# Patient Record
Sex: MALE | Race: WHITE | Employment: OTHER | URBAN - NONMETROPOLITAN AREA
[De-identification: names, ages, dates, MRNs, and addresses within clinical notes are randomized per-mention and may not be internally consistent; named-entity substitution may affect disease eponyms.]

---

## 2017-11-13 ENCOUNTER — COMMUNICATION - GICH (OUTPATIENT)
Dept: INTERNAL MEDICINE | Facility: OTHER | Age: 67
End: 2017-11-13

## 2017-11-13 DIAGNOSIS — F33.2 MAJOR DEPRESSIVE DISORDER, RECURRENT SEVERE WITHOUT PSYCHOTIC FEATURES (H): ICD-10-CM

## 2017-11-13 DIAGNOSIS — F41.9 ANXIETY DISORDER: ICD-10-CM

## 2017-11-22 ENCOUNTER — HISTORY (OUTPATIENT)
Dept: INTERNAL MEDICINE | Facility: OTHER | Age: 67
End: 2017-11-22

## 2017-11-22 ENCOUNTER — OFFICE VISIT - GICH (OUTPATIENT)
Dept: INTERNAL MEDICINE | Facility: OTHER | Age: 67
End: 2017-11-22

## 2017-11-22 DIAGNOSIS — I49.3 VENTRICULAR PREMATURE DEPOLARIZATION: ICD-10-CM

## 2017-11-22 DIAGNOSIS — I25.10 ATHEROSCLEROTIC HEART DISEASE OF NATIVE CORONARY ARTERY WITHOUT ANGINA PECTORIS: ICD-10-CM

## 2017-11-22 DIAGNOSIS — J98.01 ACUTE BRONCHOSPASM: ICD-10-CM

## 2017-11-22 DIAGNOSIS — F41.9 ANXIETY DISORDER: ICD-10-CM

## 2017-11-22 DIAGNOSIS — Z87.891 PERSONAL HISTORY OF NICOTINE DEPENDENCE: ICD-10-CM

## 2017-11-22 DIAGNOSIS — Z87.898 PERSONAL HISTORY OF OTHER SPECIFIED CONDITIONS (CODE): ICD-10-CM

## 2017-11-22 DIAGNOSIS — E78.2 MIXED HYPERLIPIDEMIA: ICD-10-CM

## 2017-11-22 DIAGNOSIS — R35.1 NOCTURIA: ICD-10-CM

## 2017-11-22 DIAGNOSIS — Z86.59 PERSONAL HISTORY OF OTHER MENTAL AND BEHAVIORAL DISORDERS: ICD-10-CM

## 2017-11-22 DIAGNOSIS — Z23 ENCOUNTER FOR IMMUNIZATION: ICD-10-CM

## 2017-11-22 DIAGNOSIS — I50.32 CHRONIC DIASTOLIC HEART FAILURE (H): ICD-10-CM

## 2017-11-22 DIAGNOSIS — I25.2 OLD MYOCARDIAL INFARCTION: ICD-10-CM

## 2017-11-22 DIAGNOSIS — R12 HEARTBURN: ICD-10-CM

## 2017-11-22 DIAGNOSIS — I25.9 CHRONIC ISCHEMIC HEART DISEASE: ICD-10-CM

## 2017-11-22 DIAGNOSIS — I10 ESSENTIAL (PRIMARY) HYPERTENSION: ICD-10-CM

## 2017-11-22 DIAGNOSIS — R73.03 PREDIABETES: ICD-10-CM

## 2017-11-22 DIAGNOSIS — I25.83 CORONARY ATHEROSCLEROSIS DUE TO LIPID RICH PLAQUE (CODE): ICD-10-CM

## 2017-11-22 LAB
A/G RATIO - HISTORICAL: 1.8 (ref 1–2)
ABSOLUTE BASOPHILS - HISTORICAL: 0.1 THOU/CU MM
ABSOLUTE EOSINOPHILS - HISTORICAL: 0.3 THOU/CU MM
ABSOLUTE IMMATURE GRANULOCYTES(METAS,MYELOS,PROS) - HISTORICAL: 0 THOU/CU MM
ABSOLUTE LYMPHOCYTES - HISTORICAL: 1.8 THOU/CU MM (ref 0.9–2.9)
ABSOLUTE MONOCYTES - HISTORICAL: 0.6 THOU/CU MM
ABSOLUTE NEUTROPHILS - HISTORICAL: 4.4 THOU/CU MM (ref 1.7–7)
ALBUMIN SERPL-MCNC: 4.4 G/DL (ref 3.5–5.7)
ALP SERPL-CCNC: 69 IU/L (ref 34–104)
ALT (SGPT) - HISTORICAL: 16 IU/L (ref 7–52)
ANION GAP - HISTORICAL: 8 (ref 5–18)
AST SERPL-CCNC: 19 IU/L (ref 13–39)
BASOPHILS # BLD AUTO: 1.4 %
BILIRUB SERPL-MCNC: 0.6 MG/DL (ref 0.3–1)
BILIRUB UR QL: NEGATIVE
BUN SERPL-MCNC: 13 MG/DL (ref 7–25)
BUN/CREAT RATIO - HISTORICAL: 13
CALCIUM SERPL-MCNC: 9.5 MG/DL (ref 8.6–10.3)
CHLORIDE SERPLBLD-SCNC: 99 MMOL/L (ref 98–107)
CHOL/HDL RATIO - HISTORICAL: 2.68
CHOLESTEROL TOTAL: 158 MG/DL
CLARITY, URINE: CLEAR CLARITY
CO2 SERPL-SCNC: 27 MMOL/L (ref 21–31)
COLOR UR: YELLOW COLOR
CREAT SERPL-MCNC: 0.99 MG/DL (ref 0.7–1.3)
EOSINOPHIL NFR BLD AUTO: 3.9 %
ERYTHROCYTE [DISTWIDTH] IN BLOOD BY AUTOMATED COUNT: 12 % (ref 11.5–15.5)
ESTIMATED AVERAGE GLUCOSE: 114 MG/DL
GFR IF NOT AFRICAN AMERICAN - HISTORICAL: >60 ML/MIN/1.73M2
GLOBULIN - HISTORICAL: 2.5 G/DL (ref 2–3.7)
GLUCOSE SERPL-MCNC: 109 MG/DL (ref 70–105)
GLUCOSE URINE: NEGATIVE MG/DL
HCT VFR BLD AUTO: 48.4 % (ref 37–53)
HDLC SERPL-MCNC: 59 MG/DL (ref 23–92)
HEMOGLOBIN A1C MONITORING (POCT) - HISTORICAL: 5.6 % (ref 4–6.2)
HEMOGLOBIN: 16.8 G/DL (ref 13.5–17.5)
IMMATURE GRANULOCYTES(METAS,MYELOS,PROS) - HISTORICAL: 0.3 %
KETONES UR QL: NEGATIVE MG/DL
LDLC SERPL CALC-MCNC: 76 MG/DL
LEUKOCYTE ESTERASE URINE: NEGATIVE
LYMPHOCYTES NFR BLD AUTO: 24.4 % (ref 20–44)
MCH RBC QN AUTO: 31.8 PG (ref 26–34)
MCHC RBC AUTO-ENTMCNC: 34.7 G/DL (ref 32–36)
MCV RBC AUTO: 92 FL (ref 80–100)
MONOCYTES NFR BLD AUTO: 8.7 %
NEUTROPHILS NFR BLD AUTO: 61.3 % (ref 42–72)
NITRITE UR QL STRIP: NEGATIVE
NON-HDL CHOLESTEROL - HISTORICAL: 99 MG/DL
OCCULT BLOOD,URINE - HISTORICAL: NEGATIVE
PH UR: 7 [PH]
PLATELET # BLD AUTO: 303 THOU/CU MM (ref 140–440)
PMV BLD: 9.2 FL (ref 6.5–11)
POTASSIUM SERPL-SCNC: 4.8 MMOL/L (ref 3.5–5.1)
PROT SERPL-MCNC: 6.9 G/DL (ref 6.4–8.9)
PROTEIN QUALITATIVE,URINE - HISTORICAL: NEGATIVE MG/DL
PROVIDER ORDERDED STATUS - HISTORICAL: NORMAL
PSA TOTAL (DIAGNOSTIC) - HISTORICAL: 1.91 NG/ML
RED BLOOD COUNT - HISTORICAL: 5.29 MIL/CU MM (ref 4.3–5.9)
SODIUM SERPL-SCNC: 134 MMOL/L (ref 133–143)
SP GR UR STRIP: 1.01
TRIGL SERPL-MCNC: 115 MG/DL
UROBILINOGEN,QUALITATIVE - HISTORICAL: NORMAL EU/DL
WHITE BLOOD COUNT - HISTORICAL: 7.2 THOU/CU MM (ref 4.5–11)

## 2017-11-22 ASSESSMENT — ANXIETY QUESTIONNAIRES
2. NOT BEING ABLE TO STOP OR CONTROL WORRYING: NOT AT ALL
GAD7 TOTAL SCORE: 0
3. WORRYING TOO MUCH ABOUT DIFFERENT THINGS: NOT AT ALL
6. BECOMING EASILY ANNOYED OR IRRITABLE: NOT AT ALL
1. FEELING NERVOUS, ANXIOUS, OR ON EDGE: NOT AT ALL
7. FEELING AFRAID AS IF SOMETHING AWFUL MIGHT HAPPEN: NOT AT ALL
5. BEING SO RESTLESS THAT IT IS HARD TO SIT STILL: NOT AT ALL
4. TROUBLE RELAXING: NOT AT ALL

## 2017-11-22 ASSESSMENT — PATIENT HEALTH QUESTIONNAIRE - PHQ9: SUM OF ALL RESPONSES TO PHQ QUESTIONS 1-9: 0

## 2017-12-28 NOTE — TELEPHONE ENCOUNTER
Patient Information     Patient Name MRN Héctor Simon [id number] Male 1950      Telephone Encounter by Nancy Velasquez RN at 11/15/2017 12:33 PM     Author:  Nancy Velasquez RN Service:  (none) Author Type:  NURS- Registered Nurse     Filed:  11/15/2017  2:45 PM Encounter Date:  2017 Status:  Signed     :  Nancy Velasquez RN (NURS- Registered Nurse)            Refill request from [facility] for Sertraline. Per chart, patient is now due for an annual appointment. A Limited supply sent over to pharmacy and reminder letter sent to patient.     Depression-in adults 18 and over  SSRI Sertraline    Office visit in the past 12 months or as indicated in chart.  Should have clinic visit 1-2 months after initial prescription.    Last visit with NITIN TOBIAS was on: 11/10/2016 in [facility]  Next visit with NITIN TOBIAS is on: 2017 in [facility]  Next visit with Internal Medicine is on: 2017 in [facility]    Max refills 12 months from last office visit or per providers notes.  Prescription refilled per RN Medication Refill Policy.................... Nancy Velasquez RN ....................  11/15/2017   12:34 PM

## 2017-12-28 NOTE — PROGRESS NOTES
Patient Information     Patient Name MRN Sex Héctor Madrigal [id number] Male 1950      Progress Notes by Edenilson Adams MD at 2017  8:00 AM     Author:  Edenilson Adams MD Service:  (none) Author Type:  Physician     Filed:  2017  6:57 PM Encounter Date:  2017 Status:  Signed     :  Edenilson Adams MD (Physician)            Nursing Notes:   Gardenia Childs  2017  7:59 AM  Signed  Patient presents for medication management.      Gardenia Childs LPN        2017 7:50 AM    Héctor Thornton presents to clinic today for:   Chief Complaint    Patient presents with      Medication Management     HPI: Mr. Thornton is a 67 y.o. male who presents today for evaluation of above.     (I25.10,  I25.83) Coronary artery disease due to lipid rich plaque  (primary encounter diagnosis)  (E78.2) Mixed hyperlipidemia  (I10) HYPERTENSION  (R73.03) Prediabetes - NEW Dx - 2015 - A1c 6.2%  (I25.2) History of ST elevation myocardial infarction (STEMI) - 2016 - Inferior RCA - s/p aspiration thrombectomy - PCI of dRCA with Drug Eluting Stent - [facility]  (I25.2) Old inferior wall myocardial infarction  (I50.32) Chronic diastolic heart failure - MILD - ECHO 2015 - EF 50-55%  (Z87.891) History of tobacco abuse  (J98.01) Bronchospasm  (F41.9) Anxiety  (I25.9) Chronic ischemic heart disease  (I49.3) PVC's (premature ventricular contractions)  (Z86.59) History of major depression  (R12) Heartburn  (R35.1) Nocturia  (Z23) Need for vaccination with 13-polyvalent pneumococcal conjugate vaccine  (Z87.898) History of prediabetes     Coronary artery disease, currently stable.  Denies exertional chest pain or heaviness.  He would like to see [facility] with Dr. Courtney.  Referral sent.  Needs cardiac medication refills.    Hyperlipidemia, currently on Crestor 40 mg daily.  Seems to be tolerating it well.  Reports he only takes the Crestor because of his doctors, he does not really believe  in statins but he continues to take them regularly.  We discussed eating grapefruit while taking Crestor, he will plan to cut down on his Crestor dosing during grapefruit season.  Para hypertension, currently well controlled.  Tolerating medications.  Check labs, medication refills today.  Last Lipids:  Chol: 2017 158   115  HDL: 2017 59   LDL: 2017 76    Prediabetes, currently managing with diet.  Recheck labs.  HEMOGLOBIN A1C MONITORING (POCT) (%)    Date Value   2017 5.6     Chronic diastolic heart failure - appears stable.  Denies orthopnea or PND.  Has chronic mild edema.      History of intermittent wheezing/bronchospasm, history of tobacco use, still intermittently using some Mucinex and his albuterol inhaler.  Reports that helps clear up any kind of bronchitis like symptoms he gets.  He would like refills today.    Chronic anxiety, reports much improved with Zoloft.  He would like to continue.  Still intermittently using alprazolam but reports very rarely.    PVCs, much improved with sotalol.  Needs refills today.      heartburn, ongoing.  Still using Prilosec.    Nocturia, continues, reports moire frequent urinating recently.  Check labs, urinalysis, PSA.    Pneumococcal vaccination due, orders placed.    Mr. Hernandezs Body mass index is 38.31 kg/(m^2). This is out of the normal range for a 67 y.o. Normal range for ages 18+ is between 18.5 and 24.9. To lose weight we reviewed risks and benefits of appropriate options such as diet, exercise, and medications. Patient's strategy will be  self-directed nutrition plan and self-directed exercise program   BP Readings from Last 1 Encounters:17 : 128/74  Mr. Hernandezs blood pressure is out of the normal range for adults. Per JNC-8 guidelines normal adult blood pressure is < 120/80, pre-hypertensive is between 120/80 and 139/89, and hypertension is 140/90 or greater. Risks of hypertension were discussed. Patient's  strategy will be weight loss, increased activity and reduced salt intake    Functional Capacity: > 4 METS.   Reports that he can climb a flight of stairs without any chest pain/heaviness or shortness of breath.   Patient reports no current symptoms of fevers, chills, nausea/vomiting.   No cough. No shortness of breath.   No change in bowel/bladder habits. No melena, hematochezia. No Hematuria.   No rashes. Rare palpitations.  No orthopnea/paroxysmal nocturnal dyspnea   No vision or hearing issues.   No significant mood issues   No bruising.     RYANNE:  RYANNE-7 ANXIETY SCREENING 12/9/2016 11/22/2017   RYANNE date (doc flow) 12/9/2016 11/22/2017   Nervous, anxious 2 0   Cannot stop worrying 1 0   Worry about different things 1 0   Cannot relax 0 0   Feeling restless 0 0   Easily annoyed/irritated 1 0   Afraid of awful event 1 0   Score 6 0   Severity mild anxiety none   Some recent data might be hidden         PHQ9:  PHQ Depression Screening 12/9/2016 11/22/2017   Date of PHQ exam (doc flow) 12/9/2016 11/22/2017   1. Lack of interest/pleasure 2 - More than half the days 0 - Not at all   2. Feeling down/depressed 1 - Several days 0 - Not at all   PHQ-2 TOTAL SCORE 3 0   3. Trouble sleeping 3 - Nearly every day 0 - Not at all   4. Decreased energy 3 - Nearly every day 0 - Not at all   5. Appetite change 1 - Several days 0 - Not at all   6. Feelings of failure 1 - Several days 0 - Not at all   7. Trouble concentrating 1 - Several days 0 - Not at all   8. Activity level 0 - Not at all 0 - Not at all   9. Hurting yourself 0 - Not at all 0 - Not at all   PHQ-9 TOTAL SCORE 12 0   PHQ-9 Severity Level moderate none   Functional Impairment very difficult not difficult at all   Some recent data might be hidden          I have personally reviewed the past medical history, past surgical history, medications, allergies, family and social history as listed below, on 11/22/2017.    Patient Active Problem List       Diagnosis  Date Noted      Nocturia  11/22/2017     History of prediabetes  11/22/2017     History of ST elevation myocardial infarction (STEMI) - 8/11/2016 - Inferior RCA - s/p aspiration thrombectomy - PCI of dRCA with Drug Eluting Stent - [facility]  08/23/2016     History of tobacco abuse  08/23/2016     Olecranon bursitis of left elbow  02/03/2016     Chronic diastolic heart failure - MILD - ECHO 9/2015 - EF 50-55%  02/03/2016     Seasonal allergies  09/14/2015     PVC's (premature ventricular contractions)  08/13/2015     Bilateral leg edema  08/12/2014     Old inferior wall myocardial infarction  08/12/2014     Hx of right coronary artery stent placement - 2008 and 2009 08/12/2014     ED (erectile dysfunction)  07/29/2013     FATIGUE  09/21/2011     CONSTIPATION  09/21/2011     Coronary artery disease due to lipid rich plaque  04/22/2011     Inferior Infarct June 2008 BMS placed to distal RCA  Non transmural MI march 2010.late stenosis to RCA OTTO placed at [facility]        Anxiety  04/22/2011     GERD (gastroesophageal reflux disease)  04/22/2011     HYPERTENSION  02/17/2011     History of major depression  02/03/2011     With somatization          PALPITATIONS  02/03/2011     ISCHEMIC HEART DISEASE, CHRONIC  12/13/2010     Mixed hyperlipidemia  12/13/2010     Past Medical History:     Diagnosis  Date     Abrasion      Anxiety      ANXIETY      CAD (coronary artery disease)     Inferior Infarct June 2008 BMS placed to distal RCA Non transmural MI march 2010.late stenosis to RCA OTTO placed at [facility]       CHEST PAIN      CONSTIPATION      Depression     RX with Paxil Spring 2011       DEPRESSION, CHRONIC, SEVERE     With somatization       ED (erectile dysfunction)      EPIGASTRIC PAIN      FATIGUE      GERD (gastroesophageal reflux disease)      Hx of elevated lipids     high cholestserol      HYPERLIPIDEMIA      HYPERTENSION      ISCHEMIC HEART DISEASE, CHRONIC      Palpitations      Tobacco user     Current  user . 2 and 1/2 packs of Camels for 40-45 years. Currently smokes a pipe.       Past Surgical History:      Procedure  Laterality Date     BACK SURGERY  age 19    fractured coccyx       COLONOSCOPY SCREENING  2011    Diverticulosis. No polyps.  Next due 2021.       CORONARY STENT PLACEMENT  6/2008,   3/2009    right coronary stents       ESOPHAGOGASTRODUODENOSCOPY  1/2/11    no HH, no GERD (one interpretation of this might be that the PPI has been successful in preventing injury.)         HAND FINGER SURGERY      right hand repair       KNEE ARTHROSCOPY      lateral release, left knee       TONSILLECTOMY  age 7     Current Outpatient Prescriptions       Medication  Sig Dispense Refill     albuterol HFA (PRO-AIR,VENTOLIN,PROVENTIL) 90 mcg/actuation inhaler Inhale 2 Puffs by mouth 4 times daily if needed (for cough and SOB). 1 Inhaler 3     ALPRAZolam (XANAX) 0.25 mg tablet Take 0.5 tablets by mouth once daily. 1/2 tab AS NEEDED for Anxiety 15 tablet 5     aspirin enteric coated 81 mg tablet Take 1 tablet by mouth once daily with a meal. 30 tablet 12     clopidogrel (PLAVIX) 75 mg tablet Take 1 tablet by mouth once daily. 90 tablet 3     FOLIC ACID/MULTIVIT-MIN/LUTEIN (CENTRUM SILVER ORAL) Take  by mouth.       furosemide (LASIX) 20 mg tablet TAKE 1/2 TO 1 TABLET BY MOUTH EVERY MORNING AS NEEDED 90 tablet 3     losartan (COZAAR) 50 mg tablet Take 1 tablet by mouth once daily. 90 tablet 3     nitroglycerin (NITROSTAT) 0.4 mg sublingual tablet Place 1 tablet under the tongue every 5 minutes if needed. 25 tablet 6     omega-3 fatty acids-vitamin E (FISH OIL) 1,000 mg cap Take 500 mg by mouth.       omeprazole (PRILOSEC) 20 mg Delayed-Release capsule Take 1 capsule by mouth once daily if needed for GI Upset. 60 capsule 11     rosuvastatin (CRESTOR) 40 mg tablet Take 1 tablet by mouth at bedtime. 90 tablet 3     SACCHAROMYCES BOULARDII (PROBIOTIC, S.BOULARDII, ORAL) Take  by mouth once daily.       sertraline (ZOLOFT) 50  mg tablet TAKE 1 AND 1/2 TO 2 TABLETS BY MOUTH EVERY  tablet 3     sildenafil citrate (VIAGRA) 100 mg tablet Take 1 tablet by mouth once daily if needed for Erectile Dysfunction. Take 30min to 4 hours before sexual activity. Max 100mg/24hr. 6 tablet 6     sotalol (BETAPACE) 80 mg tablet Take 1 tablet by mouth 2 times daily before meals. 180 tablet 3     sucralfate (CARAFATE) 1 gram tablet Take 1 tablet by mouth 4 times daily before meals and at bedtime. --- if needed for heartburn 60 tablet 11     Allergies      Allergen   Reactions     Brilinta [Ticagrelor]  Shortness Of Breath and Nausea Only     Abdominal pain, bloating - diarrhea, possible cause of stomach bleeding      Iodine  Shortness Of Breath and Runny Nose     Watery eyes        Lisinopril  Cough     Metoprolol  Bradycardia     Unlisted Allergen (Include Detail In Comments)  Hives     Pt states he got hives on his skin and inside his esophagus after OTTO in .       Family History       Problem   Relation Age of Onset     Heart Disease  Father      CAD        Cancer  Father      Bladder       Diabetes  Father      Good Health  Brother      x5       Good Health  Sister      x2       Family Status     Relation  Status     Father  at age 78     78 coronary disease, bladder cancer, DM      Brother Alive    x5      Sister Alive    x2      Mother  at age 84     of MI, possible cancer.       Brother      Sister      Social History     Social History        Marital status:       Spouse name: Arielle     Number of children:  N/A     Years of education:  N/A     Occupational History        [employer]     Social History Main Topics         Smoking status:   Former Smoker     Packs/day:  0.00     Years:  45.00     Types:  Pipe     Quit date:  2000     Smokeless tobacco:   Current User     Types:  Chew     Last attempt to quit:  1979      Comment: -- No longer using cigarettes, occasionally smokes  "a pipe      Alcohol use   No     Drug use:   No     Sexual activity:   Not on file     Other Topics   Concern      Service  No     Blood Transfusions  No     Caffeine Concern  Yes     4-5 cups of coffee daily      Sleep Concern  No     Weight Concern  Yes     Would like to lose 40 lbs 9/14/15      Special Diet  No     Back Care  No     Exercise  No     Seat Belt  Yes     Social History Narrative     , lives with wife - Arielle    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     Complete ROS was performed and was negative unless otherwise noted in HPI above.     EXAM:   Vitals:     11/22/17 0753   BP: 128/74   Pulse: 60   Weight: 120.3 kg (265 lb 2 oz)   Height: 1.772 m (5' 9.75\")     BP Readings from Last 3 Encounters:    11/22/17 128/74   12/09/16 126/80   11/10/16 102/66     Wt Readings from Last 3 Encounters:    11/22/17 120.3 kg (265 lb 2 oz)   12/09/16 117.5 kg (259 lb)   11/10/16 117.3 kg (258 lb 8 oz)     Estimated body mass index is 38.31 kg/(m^2) as calculated from the following:    Height as of this encounter: 1.772 m (5' 9.75\").    Weight as of this encounter: 120.3 kg (265 lb 2 oz).     EXAM:  Constitutional: Pleasant, alert, appropriate appearance for age. No acute distress  ENT: Normocephalic, Atraumatic, Thyroid without nodules or tenderness   Nose/Mouth: Oral pharynx without erythema or exudates, Nose is patent bilaterally, no rhinorrhea and Dental hygeine adequate   Eyes:  Extraocular muscles intact, Sclera non-icteric, Conjunctiva without erythema  Lymphatic Exam: Non-palpable nodes in neck, clavicular regions  Pulmonary: Few scattered wheezes throughout bilaterally  Cardiovascular Exam: regular rate and rhythm, trace pedal edema present  Gastrointestinal Exam: Obese, Soft, non-tender, non-distended, positive bowel sounds  Integument: No abnormal rashes, sores, or ulcerations noted  Neurologic Exam: CN 3-12 grossly intact   Musculoskeletal Exam: Moves upper and lower extremities " symmetrically, No focal weakness  Gait and station appear grossly normal  Psychiatric Exam: Awake and Alert, Affect and mood appropriate  Speech is fluent, Thought process is normal    INVESTIGATIONS:  Results for orders placed or performed in visit on 11/22/17      COMPLETE METABOLIC PANEL      Result  Value Ref Range    SODIUM 134 133 - 143 mmol/L    POTASSIUM 4.8 3.5 - 5.1 mmol/L    CHLORIDE 99 98 - 107 mmol/L    CO2,TOTAL 27 21 - 31 mmol/L    ANION GAP 8 5 - 18                    GLUCOSE 109 (H) 70 - 105 mg/dL    CALCIUM 9.5 8.6 - 10.3 mg/dL    BUN 13 7 - 25 mg/dL    CREATININE 0.99 0.70 - 1.30 mg/dL    BUN/CREAT RATIO           13                    GFR if African American >60 >60 ml/min/1.73m2    GFR if not African American >60 >60 ml/min/1.73m2    ALBUMIN 4.4 3.5 - 5.7 g/dL    PROTEIN,TOTAL 6.9 6.4 - 8.9 g/dL    GLOBULIN                  2.5 2.0 - 3.7 g/dL    A/G RATIO 1.8 1.0 - 2.0                    BILIRUBIN,TOTAL 0.6 0.3 - 1.0 mg/dL    ALK PHOSPHATASE 69 34 - 104 IU/L    ALT (SGPT) 16 7 - 52 IU/L    AST (SGOT) 19 13 - 39 IU/L   LIPID PANEL      Result  Value Ref Range    CHOLESTEROL,TOTAL 158 <200 mg/dL    TRIGLYCERIDES 115 <150 mg/dL    HDL CHOLESTEROL 59 23 - 92 mg/dL    NON-HDL CHOLESTEROL 99 <145 mg/dl    CHOL/HDL RATIO            2.68 <4.50                    LDL CHOLESTEROL 76 <100 mg/dL    PROVIDER ORDERED STATUS FASTING    Hgb A1c      Result  Value Ref Range    HEMOGLOBIN A1C MONITORING (POCT) 5.6 4.0 - 6.2 %    ESTIMATED AVERAGE GLUCOSE  114 mg/dL   PSA, TOTAL      Result  Value Ref Range    PSA TOTAL (DIAGNOSTIC) 1.910 <=3.100 ng/mL   CBC WITH AUTO DIFFERENTIAL      Result  Value Ref Range    WHITE BLOOD COUNT         7.2 4.5 - 11.0 thou/cu mm    RED BLOOD COUNT           5.29 4.30 - 5.90 mil/cu mm    HEMOGLOBIN                16.8 13.5 - 17.5 g/dL    HEMATOCRIT                48.4 37.0 - 53.0 %    MCV                       92 80 - 100 fL    MCH                       31.8 26.0 - 34.0 pg    MCHC                       34.7 32.0 - 36.0 g/dL    RDW                       12.0 11.5 - 15.5 %    PLATELET COUNT            303 140 - 440 thou/cu mm    MPV                       9.2 6.5 - 11.0 fL    NEUTROPHILS               61.3 42.0 - 72.0 %    LYMPHOCYTES               24.4 20.0 - 44.0 %    MONOCYTES                 8.7 <12.0 %    EOSINOPHILS               3.9 <8.0 %    BASOPHILS                 1.4 <3.0 %    IMMATURE GRANULOCYTES(METAS,MYELOS,PROS) 0.3 %    ABSOLUTE NEUTROPHILS      4.4 1.7 - 7.0 thou/cu mm    ABSOLUTE LYMPHOCYTES      1.8 0.9 - 2.9 thou/cu mm    ABSOLUTE MONOCYTES        0.6 <0.9 thou/cu mm    ABSOLUTE EOSINOPHILS      0.3 <0.5 thou/cu mm    ABSOLUTE BASOPHILS        0.1 <0.3 thou/cu mm    ABSOLUTE IMMATURE GRANULOCYTES(METAS,MYELOS,PROS) 0.0 <=0.3 thou/cu mm   URINALYSIS W REFLEX MICROSCOPIC IF POSITIVE      Result  Value Ref Range    COLOR                     Yellow Yellow Color    CLARITY                   Clear Clear Clarity    SPECIFIC GRAVITY,URINE    1.015 1.010, 1.015, 1.020, 1.025                    PH,URINE                  7.0 6.0, 7.0, 8.0, 5.5, 6.5, 7.5, 8.5                    UROBILINOGEN,QUALITATIVE  Normal Normal EU/dl    PROTEIN, URINE Negative Negative mg/dL    GLUCOSE, URINE Negative Negative mg/dL    KETONES,URINE             Negative Negative mg/dL    BILIRUBIN,URINE           Negative Negative                    OCCULT BLOOD,URINE        Negative Negative                    NITRITE                   Negative Negative                    LEUKOCYTE ESTERASE        Negative Negative                       ASSESSMENT AND PLAN:  Héctor was seen today for medication management.    Diagnoses and all orders for this visit:    Coronary artery disease due to lipid rich plaque  -     clopidogrel (PLAVIX) 75 mg tablet; Take 1 tablet by mouth once daily.  -     rosuvastatin (CRESTOR) 40 mg tablet; Take 1 tablet by mouth at bedtime.    Mixed hyperlipidemia  -     rosuvastatin (CRESTOR) 40 mg  tablet; Take 1 tablet by mouth at bedtime.  -     LIPID PANEL; Future  -     LIPID PANEL    HYPERTENSION  -     furosemide (LASIX) 20 mg tablet; TAKE 1/2 TO 1 TABLET BY MOUTH EVERY MORNING AS NEEDED  -     losartan (COZAAR) 50 mg tablet; Take 1 tablet by mouth once daily.  -     CBC WITH DIFFERENTIAL; Future  -     COMPLETE METABOLIC PANEL; Future  -     CBC WITH DIFFERENTIAL  -     COMPLETE METABOLIC PANEL  -     CBC WITH AUTO DIFFERENTIAL    Prediabetes - NEW Dx - 9/14/2015 - A1c 6.2%  -     Hgb A1c; Future  -     Hgb A1c    History of ST elevation myocardial infarction (STEMI) - 8/11/2016 - Inferior RCA - s/p aspiration thrombectomy - PCI of dRCA with Drug Eluting Stent - [facility]  -     clopidogrel (PLAVIX) 75 mg tablet; Take 1 tablet by mouth once daily.  -     AMB CONSULT TO CARDIOLOGY; Future    Old inferior wall myocardial infarction  -     AMB CONSULT TO CARDIOLOGY; Future    Chronic diastolic heart failure - MILD - ECHO 9/2015 - EF 50-55%  -     AMB CONSULT TO CARDIOLOGY; Future    History of tobacco abuse    Bronchospasm  -     albuterol HFA (PRO-AIR,VENTOLIN,PROVENTIL) 90 mcg/actuation inhaler; Inhale 2 Puffs by mouth 4 times daily if needed (for cough and SOB).    Anxiety  -     ALPRAZolam (XANAX) 0.25 mg tablet; Take 0.5 tablets by mouth once daily. 1/2 tab AS NEEDED for Anxiety  -     sertraline (ZOLOFT) 50 mg tablet; TAKE 1 AND 1/2 TO 2 TABLETS BY MOUTH EVERY DAY    Chronic ischemic heart disease  -     nitroglycerin (NITROSTAT) 0.4 mg sublingual tablet; Place 1 tablet under the tongue every 5 minutes if needed.  -     AMB CONSULT TO CARDIOLOGY; Future    PVC's (premature ventricular contractions)  -     sotalol (BETAPACE) 80 mg tablet; Take 1 tablet by mouth 2 times daily before meals.  -     AMB CONSULT TO CARDIOLOGY; Future    History of major depression  -     sertraline (ZOLOFT) 50 mg tablet; TAKE 1 AND 1/2 TO 2 TABLETS BY MOUTH EVERY DAY    Heartburn  -     omeprazole (PRILOSEC) 20 mg  Delayed-Release capsule; Take 1 capsule by mouth once daily if needed for GI Upset.  -     sucralfate (CARAFATE) 1 gram tablet; Take 1 tablet by mouth 4 times daily before meals and at bedtime. --- if needed for heartburn    Nocturia  -     PSA, TOTAL; Future  -     URINALYSIS W REFLEX MICROSCOPIC IF POSITIVE; Future  -     PSA, TOTAL  -     URINALYSIS W REFLEX MICROSCOPIC IF POSITIVE    Need for vaccination with 13-polyvalent pneumococcal conjugate vaccine  -     OMNI PREVNAR 13 (AKA PNEUMOCOCCAL VACCINE 13-VALENT IM)  -     SD ADMIN VACC INITIAL    History of prediabetes    lab results and schedule of future lab studies reviewed with patient, reviewed diet, exercise and weight control, recommended sodium restriction, cardiovascular risk and specific lipid/LDL goalsreviewed, use of Plavix to prevent MI and TIA's discussed    -- Expected clinical course discussed   -- Medications and their side effects discussed    Héctor is also recommended to eat a heart-healthy diet, do regular aerobic exercises, maintain a desirable body weight, and avoid tobacco products. These recommendations are from the American Heart Association (AHA) which stresses the importance of lifestyle changes to lower cardiovascular disease risk.     Return in about 1 year (around 11/22/2018) for -- annual follow-up.    Patient Instructions     Immunization History     Administered  Date(s) Administered     Influenza Virus, Unspecified 12/09/2010, 10/05/2017     Influenza, IIV3 (Age 6-35 mos) 10/22/2015, 10/11/2016     Influenza, IIV3 (Age >=3 years) 11/23/2009, 10/24/2011, 09/24/2012, 10/09/2013, 10/05/2017     Influenza, IIV4 10/20/2014, 10/23/2015, 10/12/2016     Influenza, Whole Virus 12/09/2010     Pneumococcal Poly,23-Valent (Pneumovax) 03/23/2010     Tdap 12/11/2013        Pneumococcal PCV 13 shot today.     Pneumococcal Pneumonia vaccines (PCV 13 and PCV 23)     Pneumococcal Conjugate 13 - Valent Vaccine (One time only).     Pneumococcal 23 -  Valent Vaccine -- Two doses (One before age 65 and One After)    -- repeat every 5 years in certain patient populations.     PCV 13 should be given prior to PCV 23 -- THEN -- In eight weeks or more, PCV 23 can be given.   If the patient has already received PCV 23, they should not receive PCV 13 for one year.      1. Coronary artery disease due to lipid rich plaque  - clopidogrel (PLAVIX) 75 mg tablet; Take 1 tablet by mouth once daily.  Dispense: 90 tablet; Refill: 3  - rosuvastatin (CRESTOR) 40 mg tablet; Take 1 tablet by mouth at bedtime.  Dispense: 90 tablet; Refill: 3    2. Mixed hyperlipidemia  - rosuvastatin (CRESTOR) 40 mg tablet; Take 1 tablet by mouth at bedtime.  Dispense: 90 tablet; Refill: 3  - LIPID PANEL; Future    3. HYPERTENSION  - furosemide (LASIX) 20 mg tablet; TAKE 1/2 TO 1 TABLET BY MOUTH EVERY MORNING AS NEEDED  Dispense: 90 tablet; Refill: 3  - losartan (COZAAR) 50 mg tablet; Take 1 tablet by mouth once daily.  Dispense: 90 tablet; Refill: 3  - CBC WITH DIFFERENTIAL; Future  - COMPLETE METABOLIC PANEL; Future    4. Prediabetes - NEW Dx - 9/14/2015 - A1c 6.2%  - Hgb A1c; Future    5. History of ST elevation myocardial infarction (STEMI) - 8/11/2016 - Inferior RCA - s/p aspiration thrombectomy - PCI of dRCA with Drug Eluting Stent - [facility]  - clopidogrel (PLAVIX) 75 mg tablet; Take 1 tablet by mouth once daily.  Dispense: 90 tablet; Refill: 3    6. Old inferior wall myocardial infarction    7. Chronic diastolic heart failure - MILD - ECHO 9/2015 - EF 50-55%    8. History of tobacco abuse    9. Bronchospasm  - albuterol HFA (PRO-AIR,VENTOLIN,PROVENTIL) 90 mcg/actuation inhaler; Inhale 2 Puffs by mouth 4 times daily if needed (for cough and SOB).  Dispense: 1 Inhaler; Refill: 3    10. Anxiety  - ALPRAZolam (XANAX) 0.25 mg tablet; Take 0.5 tablets by mouth once daily. 1/2 tab AS NEEDED for Anxiety  Dispense: 15 tablet; Refill: 5  - sertraline (ZOLOFT) 50 mg tablet; TAKE 1 AND 1/2 TO 2  TABLETS BY MOUTH EVERY DAY  Dispense: 180 tablet; Refill: 3    11. Chronic ischemic heart disease  - nitroglycerin (NITROSTAT) 0.4 mg sublingual tablet; Place 1 tablet under the tongue every 5 minutes if needed.  Dispense: 25 tablet; Refill: 6    12. PVC's (premature ventricular contractions)  - sotalol (BETAPACE) 80 mg tablet; Take 1 tablet by mouth 2 times daily before meals.  Dispense: 180 tablet; Refill: 3    13. History of major depression  - sertraline (ZOLOFT) 50 mg tablet; TAKE 1 AND 1/2 TO 2 TABLETS BY MOUTH EVERY DAY  Dispense: 180 tablet; Refill: 3    14. Heartburn  - omeprazole (PRILOSEC) 20 mg Delayed-Release capsule; Take 1 capsule by mouth once daily if needed for GI Upset.  Dispense: 60 capsule; Refill: 11  - sucralfate (CARAFATE) 1 gram tablet; Take 1 tablet by mouth 4 times daily before meals and at bedtime. --- if needed for heartburn  Dispense: 60 tablet; Refill: 11    15. Nocturia  - PSA, TOTAL; Future  - URINALYSIS W REFLEX MICROSCOPIC IF POSITIVE; Future    16. Need for vaccination with 13-polyvalent pneumococcal conjugate vaccine  - OMNI PREVNAR 13 (AKA PNEUMOCOCCAL VACCINE 13-VALENT IM)      Medications refilled.   Labs today.     Return in approximately 1 year, or sooner as needed for follow-up with Dr. Adams.  -- annual follow-up    Clinic : [phone]  Appointment line: [phone]      Edenilson Adams MD

## 2017-12-29 NOTE — PATIENT INSTRUCTIONS
Patient Information     Patient Name MRN Héctor Simon [id number] Male 1950      Patient Instructions by Edenilson Adams MD at 2017  8:00 AM     Author:  Edenilson Adams MD  Service:  (none) Author Type:  Physician     Filed:  2017  8:12 AM  Encounter Date:  2017 Status:  Addendum     :  Edenilson Adams MD (Physician)        Related Notes: Original Note by Edenilson Adams MD (Physician) filed at 2017  8:12 AM            Immunization History     Administered  Date(s) Administered     Influenza Virus, Unspecified 2010, 10/05/2017     Influenza, IIV3 (Age 6-35 mos) 10/22/2015, 10/11/2016     Influenza, IIV3 (Age >=3 years) 2009, 10/24/2011, 2012, 10/09/2013, 10/05/2017     Influenza, IIV4 10/20/2014, 10/23/2015, 10/12/2016     Influenza, Whole Virus 2010     Pneumococcal Poly,23-Valent (Pneumovax) 2010     Tdap 2013        Pneumococcal PCV 13 shot today.     Pneumococcal Pneumonia vaccines (PCV 13 and PCV 23)     Pneumococcal Conjugate 13 - Valent Vaccine (One time only).     Pneumococcal 23 - Valent Vaccine -- Two doses (One before age 65 and One After)    -- repeat every 5 years in certain patient populations.     PCV 13 should be given prior to PCV 23 -- THEN -- In eight weeks or more, PCV 23 can be given.   If the patient has already received PCV 23, they should not receive PCV 13 for one year.      1. Coronary artery disease due to lipid rich plaque  - clopidogrel (PLAVIX) 75 mg tablet; Take 1 tablet by mouth once daily.  Dispense: 90 tablet; Refill: 3  - rosuvastatin (CRESTOR) 40 mg tablet; Take 1 tablet by mouth at bedtime.  Dispense: 90 tablet; Refill: 3    2. Mixed hyperlipidemia  - rosuvastatin (CRESTOR) 40 mg tablet; Take 1 tablet by mouth at bedtime.  Dispense: 90 tablet; Refill: 3  - LIPID PANEL; Future    3. HYPERTENSION  - furosemide (LASIX) 20 mg tablet; TAKE 1/2 TO 1 TABLET BY MOUTH EVERY MORNING AS NEEDED   Dispense: 90 tablet; Refill: 3  - losartan (COZAAR) 50 mg tablet; Take 1 tablet by mouth once daily.  Dispense: 90 tablet; Refill: 3  - CBC WITH DIFFERENTIAL; Future  - COMPLETE METABOLIC PANEL; Future    4. Prediabetes - NEW Dx - 9/14/2015 - A1c 6.2%  - Hgb A1c; Future    5. History of ST elevation myocardial infarction (STEMI) - 8/11/2016 - Inferior RCA - s/p aspiration thrombectomy - PCI of dRCA with Drug Eluting Stent - [facility]  - clopidogrel (PLAVIX) 75 mg tablet; Take 1 tablet by mouth once daily.  Dispense: 90 tablet; Refill: 3    6. Old inferior wall myocardial infarction    7. Chronic diastolic heart failure - MILD - ECHO 9/2015 - EF 50-55%    8. History of tobacco abuse    9. Bronchospasm  - albuterol HFA (PRO-AIR,VENTOLIN,PROVENTIL) 90 mcg/actuation inhaler; Inhale 2 Puffs by mouth 4 times daily if needed (for cough and SOB).  Dispense: 1 Inhaler; Refill: 3    10. Anxiety  - ALPRAZolam (XANAX) 0.25 mg tablet; Take 0.5 tablets by mouth once daily. 1/2 tab AS NEEDED for Anxiety  Dispense: 15 tablet; Refill: 5  - sertraline (ZOLOFT) 50 mg tablet; TAKE 1 AND 1/2 TO 2 TABLETS BY MOUTH EVERY DAY  Dispense: 180 tablet; Refill: 3    11. Chronic ischemic heart disease  - nitroglycerin (NITROSTAT) 0.4 mg sublingual tablet; Place 1 tablet under the tongue every 5 minutes if needed.  Dispense: 25 tablet; Refill: 6    12. PVC's (premature ventricular contractions)  - sotalol (BETAPACE) 80 mg tablet; Take 1 tablet by mouth 2 times daily before meals.  Dispense: 180 tablet; Refill: 3    13. History of major depression  - sertraline (ZOLOFT) 50 mg tablet; TAKE 1 AND 1/2 TO 2 TABLETS BY MOUTH EVERY DAY  Dispense: 180 tablet; Refill: 3    14. Heartburn  - omeprazole (PRILOSEC) 20 mg Delayed-Release capsule; Take 1 capsule by mouth once daily if needed for GI Upset.  Dispense: 60 capsule; Refill: 11  - sucralfate (CARAFATE) 1 gram tablet; Take 1 tablet by mouth 4 times daily before meals and at bedtime. --- if  needed for heartburn  Dispense: 60 tablet; Refill: 11    15. Nocturia  - PSA, TOTAL; Future  - URINALYSIS W REFLEX MICROSCOPIC IF POSITIVE; Future    16. Need for vaccination with 13-polyvalent pneumococcal conjugate vaccine  - OMNI PREVNAR 13 (AKA PNEUMOCOCCAL VACCINE 13-VALENT IM)      Medications refilled.   Labs today.     Return in approximately 1 year, or sooner as needed for follow-up with Dr. Adams.  -- annual follow-up    Clinic : [phone]  Appointment line: [phone]

## 2017-12-30 NOTE — NURSING NOTE
Patient Information     Patient Name MRN Héctor Simon [id number] Male 1950      Nursing Note by Gardenia Childs at 2017  8:00 AM     Author:  Gardenia Childs Service:  (none) Author Type:  (none)     Filed:  2017  7:59 AM Encounter Date:  2017 Status:  Signed     :  Gardenia Childs            Patient presents for medication management.      Gardenia Childs LPN        2017 7:50 AM

## 2018-01-26 VITALS
WEIGHT: 265.13 LBS | DIASTOLIC BLOOD PRESSURE: 74 MMHG | SYSTOLIC BLOOD PRESSURE: 128 MMHG | HEIGHT: 70 IN | HEART RATE: 60 BPM | BODY MASS INDEX: 37.96 KG/M2

## 2018-01-28 ASSESSMENT — ANXIETY QUESTIONNAIRES: GAD7 TOTAL SCORE: 0

## 2018-01-28 ASSESSMENT — PATIENT HEALTH QUESTIONNAIRE - PHQ9: SUM OF ALL RESPONSES TO PHQ QUESTIONS 1-9: 0

## 2018-02-23 DIAGNOSIS — I25.83 CORONARY ARTERY DISEASE DUE TO LIPID RICH PLAQUE: ICD-10-CM

## 2018-02-23 DIAGNOSIS — F34.1 DYSTHYMIA: ICD-10-CM

## 2018-02-23 DIAGNOSIS — E78.2 MIXED HYPERLIPIDEMIA: Primary | ICD-10-CM

## 2018-02-23 DIAGNOSIS — I10 BENIGN ESSENTIAL HYPERTENSION: ICD-10-CM

## 2018-02-23 DIAGNOSIS — I49.3 PVC'S (PREMATURE VENTRICULAR CONTRACTIONS): ICD-10-CM

## 2018-02-23 DIAGNOSIS — I10 HYPERTENSION: ICD-10-CM

## 2018-02-23 DIAGNOSIS — I25.10 CORONARY ARTERY DISEASE DUE TO LIPID RICH PLAQUE: ICD-10-CM

## 2018-02-23 NOTE — TELEPHONE ENCOUNTER
Patient needing refills on Rosuvastatin 40 mg, Clopidogrel Bisulfate 75mg, Sertraline 50 mg, Losartan 50 mg, and Sotalol 80 mg    When reordering a warning comes up with medications.  All medications noted in [facility] as refilled to [facility] 11/22/17 for one year but [facility] is requesting refills now.  Alexandra Jiménez RN on 2/23/2018 at 4:04 PM

## 2018-02-26 ENCOUNTER — DOCUMENTATION ONLY (OUTPATIENT)
Dept: FAMILY MEDICINE | Facility: OTHER | Age: 68
End: 2018-02-26

## 2018-02-26 PROBLEM — Z87.898 HISTORY OF PREDIABETES: Status: ACTIVE | Noted: 2017-11-22

## 2018-02-26 PROBLEM — R35.1 NOCTURIA: Status: ACTIVE | Noted: 2017-11-22

## 2018-02-26 RX ORDER — NITROGLYCERIN 0.4 MG/1
0.4 TABLET SUBLINGUAL EVERY 5 MIN PRN
COMMUNITY
Start: 2017-11-22 | End: 2019-04-18

## 2018-02-26 RX ORDER — SOTALOL HYDROCHLORIDE 80 MG/1
80 TABLET ORAL 2 TIMES DAILY
Qty: 180 TABLET | Refills: 3 | Status: SHIPPED | OUTPATIENT
Start: 2018-02-26 | End: 2021-10-28

## 2018-02-26 RX ORDER — ALPRAZOLAM 0.25 MG
0.12 TABLET ORAL DAILY
COMMUNITY
Start: 2017-11-22 | End: 2019-04-18

## 2018-02-26 RX ORDER — CLOPIDOGREL BISULFATE 75 MG/1
75 TABLET ORAL DAILY
Qty: 90 TABLET | Refills: 3 | Status: SHIPPED | OUTPATIENT
Start: 2018-02-26 | End: 2019-05-22

## 2018-02-26 RX ORDER — LOSARTAN POTASSIUM 50 MG/1
50 TABLET ORAL DAILY
COMMUNITY
Start: 2017-11-22 | End: 2018-06-05

## 2018-02-26 RX ORDER — SUCRALFATE 1 G/1
1 TABLET ORAL
COMMUNITY
Start: 2017-11-22 | End: 2021-10-28

## 2018-02-26 RX ORDER — LOSARTAN POTASSIUM 50 MG/1
50 TABLET ORAL DAILY
Qty: 90 TABLET | Refills: 3 | Status: SHIPPED | OUTPATIENT
Start: 2018-02-26 | End: 2019-11-21

## 2018-02-26 RX ORDER — FUROSEMIDE 20 MG
.5-1 TABLET ORAL EVERY MORNING
COMMUNITY
Start: 2017-11-22 | End: 2021-10-28

## 2018-02-26 RX ORDER — CHLORAL HYDRATE 500 MG
500 CAPSULE ORAL
COMMUNITY

## 2018-02-26 RX ORDER — ALBUTEROL SULFATE 90 UG/1
2 AEROSOL, METERED RESPIRATORY (INHALATION) 4 TIMES DAILY PRN
COMMUNITY
Start: 2017-11-22 | End: 2019-03-27

## 2018-02-26 RX ORDER — SILDENAFIL 100 MG/1
100 TABLET, FILM COATED ORAL DAILY PRN
COMMUNITY
Start: 2015-09-18 | End: 2019-04-18

## 2018-02-26 RX ORDER — CLOPIDOGREL BISULFATE 75 MG/1
75 TABLET ORAL DAILY
COMMUNITY
Start: 2017-11-22 | End: 2018-06-05

## 2018-02-26 RX ORDER — ROSUVASTATIN CALCIUM 40 MG/1
40 TABLET, COATED ORAL DAILY
Qty: 90 TABLET | Refills: 3 | Status: SHIPPED | OUTPATIENT
Start: 2018-02-26 | End: 2020-03-17

## 2018-02-26 RX ORDER — SOTALOL HYDROCHLORIDE 80 MG/1
80 TABLET ORAL
COMMUNITY
Start: 2017-11-22 | End: 2019-04-10

## 2018-02-26 RX ORDER — ASPIRIN 81 MG/1
81 TABLET ORAL
COMMUNITY
Start: 2013-05-14

## 2018-02-26 RX ORDER — ROSUVASTATIN CALCIUM 40 MG/1
40 TABLET, COATED ORAL AT BEDTIME
COMMUNITY
Start: 2017-11-22 | End: 2018-06-05

## 2018-06-05 ENCOUNTER — HOSPITAL ENCOUNTER (OUTPATIENT)
Dept: GENERAL RADIOLOGY | Facility: OTHER | Age: 68
Discharge: HOME | End: 2018-06-05
Attending: INTERNAL MEDICINE | Admitting: INTERNAL MEDICINE
Payer: COMMERCIAL

## 2018-06-05 ENCOUNTER — OFFICE VISIT (OUTPATIENT)
Dept: INTERNAL MEDICINE | Facility: OTHER | Age: 68
End: 2018-06-05
Attending: INTERNAL MEDICINE
Payer: COMMERCIAL

## 2018-06-05 VITALS
WEIGHT: 263 LBS | SYSTOLIC BLOOD PRESSURE: 124 MMHG | HEART RATE: 60 BPM | BODY MASS INDEX: 38.01 KG/M2 | DIASTOLIC BLOOD PRESSURE: 70 MMHG

## 2018-06-05 DIAGNOSIS — M89.8X7 PAIN IN METATARSUS OF RIGHT FOOT: ICD-10-CM

## 2018-06-05 DIAGNOSIS — R93.1 REGIONAL WALL MOTION ABNORMALITY OF HEART: ICD-10-CM

## 2018-06-05 DIAGNOSIS — R39.12 WEAK URINE STREAM: ICD-10-CM

## 2018-06-05 DIAGNOSIS — R10.13 ABDOMINAL PAIN, EPIGASTRIC: ICD-10-CM

## 2018-06-05 DIAGNOSIS — E78.2 MIXED HYPERLIPIDEMIA: ICD-10-CM

## 2018-06-05 DIAGNOSIS — I10 BENIGN ESSENTIAL HYPERTENSION: ICD-10-CM

## 2018-06-05 DIAGNOSIS — R13.10 FOOD STICKS ON SWALLOWING: ICD-10-CM

## 2018-06-05 DIAGNOSIS — I25.10 CORONARY ARTERIOSCLEROSIS IN NATIVE ARTERY: Primary | ICD-10-CM

## 2018-06-05 DIAGNOSIS — R39.14 FEELING OF INCOMPLETE BLADDER EMPTYING: ICD-10-CM

## 2018-06-05 LAB
ALBUMIN SERPL-MCNC: 4 G/DL (ref 3.5–5.7)
ALBUMIN UR-MCNC: NEGATIVE MG/DL
ALP SERPL-CCNC: 60 U/L (ref 34–104)
ALT SERPL W P-5'-P-CCNC: 12 U/L (ref 7–52)
ANION GAP SERPL CALCULATED.3IONS-SCNC: 5 MMOL/L (ref 3–14)
APPEARANCE UR: CLEAR
AST SERPL W P-5'-P-CCNC: 16 U/L (ref 13–39)
BASOPHILS # BLD AUTO: 0.1 10E9/L (ref 0–0.2)
BASOPHILS NFR BLD AUTO: 1.3 %
BILIRUB SERPL-MCNC: 0.3 MG/DL (ref 0.3–1)
BILIRUB UR QL STRIP: NEGATIVE
BUN SERPL-MCNC: 13 MG/DL (ref 7–25)
CALCIUM SERPL-MCNC: 9.4 MG/DL (ref 8.6–10.3)
CHLORIDE SERPL-SCNC: 100 MMOL/L (ref 98–107)
CHOLEST SERPL-MCNC: 149 MG/DL
CO2 SERPL-SCNC: 29 MMOL/L (ref 21–31)
COLOR UR AUTO: YELLOW
CREAT SERPL-MCNC: 0.94 MG/DL (ref 0.7–1.3)
DIFFERENTIAL METHOD BLD: NORMAL
EOSINOPHIL # BLD AUTO: 0.3 10E9/L (ref 0–0.7)
EOSINOPHIL NFR BLD AUTO: 4.2 %
ERYTHROCYTE [DISTWIDTH] IN BLOOD BY AUTOMATED COUNT: 12 % (ref 10–15)
GFR SERPL CREATININE-BSD FRML MDRD: 81 ML/MIN/1.7M2
GLUCOSE SERPL-MCNC: 103 MG/DL (ref 70–105)
GLUCOSE UR STRIP-MCNC: NEGATIVE MG/DL
HCT VFR BLD AUTO: 48 % (ref 40–53)
HDLC SERPL-MCNC: 46 MG/DL (ref 23–92)
HGB BLD-MCNC: 16.3 G/DL (ref 13.3–17.7)
HGB UR QL STRIP: NEGATIVE
IMM GRANULOCYTES # BLD: 0 10E9/L (ref 0–0.4)
IMM GRANULOCYTES NFR BLD: 0.1 %
KETONES UR STRIP-MCNC: ABNORMAL MG/DL
LDLC SERPL CALC-MCNC: 81 MG/DL
LEUKOCYTE ESTERASE UR QL STRIP: NEGATIVE
LYMPHOCYTES # BLD AUTO: 1.9 10E9/L (ref 0.8–5.3)
LYMPHOCYTES NFR BLD AUTO: 27.1 %
MCH RBC QN AUTO: 31.4 PG (ref 26.5–33)
MCHC RBC AUTO-ENTMCNC: 34 G/DL (ref 31.5–36.5)
MCV RBC AUTO: 93 FL (ref 78–100)
MONOCYTES # BLD AUTO: 0.5 10E9/L (ref 0–1.3)
MONOCYTES NFR BLD AUTO: 7.6 %
NEUTROPHILS # BLD AUTO: 4.3 10E9/L (ref 1.6–8.3)
NEUTROPHILS NFR BLD AUTO: 59.7 %
NITRATE UR QL: NEGATIVE
NONHDLC SERPL-MCNC: 103 MG/DL
PH UR STRIP: 6 PH (ref 5–7)
PLATELET # BLD AUTO: 325 10E9/L (ref 150–450)
POTASSIUM SERPL-SCNC: 4.4 MMOL/L (ref 3.5–5.1)
PROT SERPL-MCNC: 6.5 G/DL (ref 6.4–8.9)
PSA SERPL-MCNC: 1.59 NG/ML
RBC # BLD AUTO: 5.19 10E12/L (ref 4.4–5.9)
SODIUM SERPL-SCNC: 134 MMOL/L (ref 134–144)
SOURCE: ABNORMAL
SP GR UR STRIP: >1.03 (ref 1–1.03)
TRIGL SERPL-MCNC: 110 MG/DL
UROBILINOGEN UR STRIP-ACNC: 1 EU/DL (ref 0.2–1)
WBC # BLD AUTO: 7.2 10E9/L (ref 4–11)

## 2018-06-05 PROCEDURE — 85025 COMPLETE CBC W/AUTO DIFF WBC: CPT | Performed by: INTERNAL MEDICINE

## 2018-06-05 PROCEDURE — 99214 OFFICE O/P EST MOD 30 MIN: CPT | Mod: 25 | Performed by: INTERNAL MEDICINE

## 2018-06-05 PROCEDURE — 80053 COMPREHEN METABOLIC PANEL: CPT | Performed by: INTERNAL MEDICINE

## 2018-06-05 PROCEDURE — 73630 X-RAY EXAM OF FOOT: CPT | Mod: RT

## 2018-06-05 PROCEDURE — 93000 ELECTROCARDIOGRAM COMPLETE: CPT | Performed by: INTERNAL MEDICINE

## 2018-06-05 PROCEDURE — 36415 COLL VENOUS BLD VENIPUNCTURE: CPT | Performed by: INTERNAL MEDICINE

## 2018-06-05 PROCEDURE — 81003 URINALYSIS AUTO W/O SCOPE: CPT | Performed by: INTERNAL MEDICINE

## 2018-06-05 PROCEDURE — 80061 LIPID PANEL: CPT | Performed by: INTERNAL MEDICINE

## 2018-06-05 PROCEDURE — 84153 ASSAY OF PSA TOTAL: CPT | Performed by: INTERNAL MEDICINE

## 2018-06-05 ASSESSMENT — PAIN SCALES - GENERAL: PAINLEVEL: SEVERE PAIN (7)

## 2018-06-05 ASSESSMENT — ANXIETY QUESTIONNAIRES
7. FEELING AFRAID AS IF SOMETHING AWFUL MIGHT HAPPEN: NOT AT ALL
1. FEELING NERVOUS, ANXIOUS, OR ON EDGE: NOT AT ALL
GAD7 TOTAL SCORE: 0
IF YOU CHECKED OFF ANY PROBLEMS ON THIS QUESTIONNAIRE, HOW DIFFICULT HAVE THESE PROBLEMS MADE IT FOR YOU TO DO YOUR WORK, TAKE CARE OF THINGS AT HOME, OR GET ALONG WITH OTHER PEOPLE: NOT DIFFICULT AT ALL
2. NOT BEING ABLE TO STOP OR CONTROL WORRYING: NOT AT ALL
6. BECOMING EASILY ANNOYED OR IRRITABLE: NOT AT ALL
3. WORRYING TOO MUCH ABOUT DIFFERENT THINGS: NOT AT ALL
5. BEING SO RESTLESS THAT IT IS HARD TO SIT STILL: NOT AT ALL

## 2018-06-05 ASSESSMENT — ENCOUNTER SYMPTOMS
AGITATION: 0
DIZZINESS: 0
LIGHT-HEADEDNESS: 0
DYSURIA: 0
FREQUENCY: 1
PALPITATIONS: 0
FATIGUE: 0
MYALGIAS: 0
ABDOMINAL PAIN: 1
EYE PAIN: 0
CONFUSION: 0
CHILLS: 0
BRUISES/BLEEDS EASILY: 0
DIARRHEA: 0
VOMITING: 0
NAUSEA: 0
COUGH: 0
HEMATURIA: 0
ARTHRALGIAS: 0
SHORTNESS OF BREATH: 0
WHEEZING: 0
FEVER: 0

## 2018-06-05 ASSESSMENT — PATIENT HEALTH QUESTIONNAIRE - PHQ9: 5. POOR APPETITE OR OVEREATING: NOT AT ALL

## 2018-06-05 NOTE — LETTER
Héctor Thornton  [address] [address]    6/5/2018      Dear Héctor Thornton,    The result of your recent tests are included below:    Labs look good.  Continue current medications.    Results for orders placed or performed in visit on 06/05/18   Urinalysis w Reflex Microscopic If Positive   Result Value Ref Range    Color Urine Yellow     Appearance Urine Clear     Glucose Urine Negative NEG^Negative mg/dL    Bilirubin Urine Negative NEG^Negative    Ketones Urine Trace (A) NEG^Negative mg/dL    Specific Gravity Urine >1.030 1.003 - 1.035    Blood Urine Negative NEG^Negative    pH Urine 6.0 5.0 - 7.0 pH    Protein Albumin Urine Negative NEG^Negative mg/dL    Urobilinogen Urine 1.0 0.2 - 1.0 EU/dL    Nitrite Urine Negative NEG^Negative    Leukocyte Esterase Urine Negative NEG^Negative    Source Midstream Urine    Prostate Specific Antigen GH   Result Value Ref Range    Prostate Specific Antigen 1.586 <3.100 ng/mL   CBC and Differential   Result Value Ref Range    WBC 7.2 4.0 - 11.0 10e9/L    RBC Count 5.19 4.4 - 5.9 10e12/L    Hemoglobin 16.3 13.3 - 17.7 g/dL    Hematocrit 48.0 40.0 - 53.0 %    MCV 93 78 - 100 fl    MCH 31.4 26.5 - 33.0 pg    MCHC 34.0 31.5 - 36.5 g/dL    RDW 12.0 10.0 - 15.0 %    Platelet Count 325 150 - 450 10e9/L    Diff Method Automated Method     % Neutrophils 59.7 %    % Lymphocytes 27.1 %    % Monocytes 7.6 %    % Eosinophils 4.2 %    % Basophils 1.3 %    % Immature Granulocytes 0.1 %    Absolute Neutrophil 4.3 1.6 - 8.3 10e9/L    Absolute Lymphocytes 1.9 0.8 - 5.3 10e9/L    Absolute Monocytes 0.5 0.0 - 1.3 10e9/L    Absolute Eosinophils 0.3 0.0 - 0.7 10e9/L    Absolute Basophils 0.1 0.0 - 0.2 10e9/L    Abs Immature Granulocytes 0.0 0 - 0.4 10e9/L   Comprehensive metabolic panel   Result Value Ref Range    Sodium 134 134 - 144 mmol/L    Potassium 4.4 3.5 - 5.1 mmol/L    Chloride 100 98 - 107 mmol/L    Carbon Dioxide 29 21 - 31 mmol/L    Anion Gap 5 3 - 14 mmol/L     Glucose 103 70 - 105 mg/dL    Urea Nitrogen 13 7 - 25 mg/dL    Creatinine 0.94 0.70 - 1.30 mg/dL    GFR Estimate 81 >60 mL/min/1.7m2    GFR Estimate If Black >90 >60 mL/min/1.7m2    Calcium 9.4 8.6 - 10.3 mg/dL    Bilirubin Total 0.3 0.3 - 1.0 mg/dL    Albumin 4.0 3.5 - 5.7 g/dL    Protein Total 6.5 6.4 - 8.9 g/dL    Alkaline Phosphatase 60 34 - 104 U/L    ALT 12 7 - 52 U/L    AST 16 13 - 39 U/L   Lipid Panel   Result Value Ref Range    Cholesterol 149 <200 mg/dL    Triglycerides 110 <150 mg/dL    HDL Cholesterol 46 23 - 92 mg/dL    LDL Cholesterol Calculated 81 <100 mg/dL    Non HDL Cholesterol 103 <130 mg/dL       If you have any further questions or problems, please contact my office at [phone] and schedule an appointment.    Clinic : [phone]  Appointment line: [phone]     Thank you,    Edenilson Adams MD    Internal Medicine  [facility]     Reviewed and electronically signed by provider.

## 2018-06-05 NOTE — MR AVS SNAPSHOT
After Visit Summary   6/5/2018    Héctor Thornton    MRN: [id number]           Patient Information     Date Of Birth          1950        Visit Information        Provider Department      6/5/2018 10:00 AM Edenilson Adams MD [facility]        Today's Diagnoses     Coronary arteriosclerosis in native artery    -  1    Regional wall motion abnormality of heart        Pain in metatarsus of right foot        Benign essential hypertension        Mixed hyperlipidemia        Weak urine stream        Feeling of incomplete bladder emptying        Food sticks on swallowing        Abdominal pain, epigastric          Care Instructions    1. Coronary arteriosclerosis in native artery  2. Regional wall motion abnormality of heart  - Echocardiogram Complete; Future  - they will call with date/time of appointment.    - EKG 12-lead, tracing only    3. Pain in metatarsus of right foot  - XR Foot Right G/E 3 Views; Future    4. Benign essential hypertension  - labs today.     5. Mixed hyperlipidemia  - Lipid Panel    6. Weak urine stream  - Urinalysis w Reflex Microscopic If Positive  - Prostate Specific Antigen GH  - CBC and Differential  - Comprehensive metabolic panel  - UROLOGY ADULT REFERRAL    7. Feeling of incomplete bladder emptying  - Urinalysis w Reflex Microscopic If Positive  - UROLOGY ADULT REFERRAL    Urology clinic referral was sent - Dr. Marcial - they will call with date/time of appointment.      8. Food sticks on swallowing  - XR Esophagram; Future  - they will call with date/time of appointment.      9. Abdominal pain, epigastric  - H Pylori antigen, stool; Future            Follow-ups after your visit        Additional Services     UROLOGY ADULT REFERRAL       Your provider has referred you to: [facility]: [facility] [phone]  [email or web address]    Please be aware that coverage of these services is subject to the terms and  limitations of your health insurance plan.  Call member services at your health plan with any benefit or coverage questions.      Please bring the following with you to your appointment:    (1) Any X-Rays, CTs or MRIs which have been performed.  Contact the facility where they were done to arrange for  prior to your scheduled appointment.    (2) List of current medications  (3) This referral request   (4) Any documents/labs given to you for this referral                  Future tests that were ordered for you today     Open Future Orders        Priority Expected Expires Ordered    XR Esophagram Routine 6/5/2018 6/5/2019 6/5/2018    H Pylori antigen, stool Routine  7/5/2018 6/5/2018    XR Foot Right G/E 3 Views Routine 6/5/2018 6/5/2019 6/5/2018    Echocardiogram Complete Routine  6/5/2019 6/5/2018            Who to contact     If you have questions or need follow up information about today's clinic visit or your schedule please contact [facility] directly at [phone].  Normal or non-critical lab and imaging results will be communicated to you by [employer], letter or phone within 4 business days after the clinic has received the results. If you do not hear from us within 7 days, please contact the clinic through [employer] or phone. If you have a critical or abnormal lab result, we will notify you by phone as soon as possible.  Submit refill requests through [employer] or call your pharmacy and they will forward the refill request to us. Please allow 3 business days for your refill to be completed.          Additional Information About Your Visit        [employer] Information     [employer] gives you secure access to your electronic health record. If you see a primary care provider, you can also send messages to your care team and make appointments. If you have questions, please call your primary care clinic.  If you do not have a primary care provider, please call [phone] and they will assist  you.        Care EveryWhere ID     This is your Care EveryWhere ID. This could be used by other organizations to access your [place] medical records  [id number]        Your Vitals Were     Pulse BMI (Body Mass Index)                60 38.01 kg/m2           Blood Pressure from Last 3 Encounters:   06/05/18 124/70   11/22/17 128/74   12/09/16 126/80    Weight from Last 3 Encounters:   06/05/18 263 lb (119.3 kg)   11/22/17 265 lb 2 oz (120.3 kg)   12/09/16 259 lb (117.5 kg)              We Performed the Following     CBC and Differential     Comprehensive metabolic panel     EKG 12-lead, tracing only     Lipid Panel     Prostate Specific Antigen GH     Urinalysis w Reflex Microscopic If Positive     UROLOGY ADULT REFERRAL        Primary Care Provider Office Phone # Fax #    Edenilson Adams [phone] [address] [facility] [address]        Equal Access to Services     DAVID [facility] : Hadii aad ku hadasho Soomaali, waaxda luqadaha, qaybta kaalmada adeegyada, eugenie crews . So [facility] [phone].    ATENCIÓN: Si habla español, tiene a fournier disposición servicios gratuitos de asistencia lingüística. Llame al [phone].    We comply with applicable federal civil rights laws and [place] laws. We do not discriminate on the basis of race, color, national origin, age, disability, sex, sexual orientation, or gender identity.            Thank you!     Thank you for choosing [facility]  for your care. Our goal is always to provide you with excellent care. Hearing back from our patients is one way we can continue to improve our services. Please take a few minutes to complete the written survey that you may receive in the mail after your visit with us. Thank you!             Your Updated Medication List - Protect others around you: Learn how to safely use, store and throw away your medicines at www.disposemymeds.org.          This list is  accurate as of 6/5/18 10:31 AM.  Always use your most recent med list.                   Brand Name Dispense Instructions for use Diagnosis    albuterol 108 (90 Base) MCG/ACT Inhaler    PROAIR HFA/PROVENTIL HFA/VENTOLIN HFA     Inhale 2 puffs into the lungs 4 times daily as needed for cough or shortness of breath / dyspnea        ALPRAZolam 0.25 MG tablet    XANAX     Take 0.125 mg by mouth daily and as needed for anxiety.        aspirin 81 MG EC tablet      Take 81 mg by mouth daily with food        clopidogrel 75 MG tablet    PLAVIX    90 tablet    Take 1 tablet (75 mg) by mouth daily    Coronary artery disease due to lipid rich plaque       furosemide 20 MG tablet    LASIX     Take 0.5-1 tablets by mouth every morning as needed        losartan 50 MG tablet    COZAAR    90 tablet    Take 1 tablet (50 mg) by mouth daily    Benign essential hypertension       Multi-vitamin Tabs tablet      Take 1 tablet by mouth daily        nitroGLYcerin 0.4 MG sublingual tablet    NITROSTAT     Place 0.4 mg under the tongue every 5 minutes as needed        * OMEGA-3 FISH OIL PO      Take 500 mg by mouth        * fish oil-omega-3 fatty acids 1000 MG capsule      Take 500 mg by mouth        omeprazole 20 MG CR capsule    priLOSEC     Take 20 mg by mouth daily as needed for GI upset.        rosuvastatin 40 MG tablet    CRESTOR    90 tablet    Take 1 tablet (40 mg) by mouth daily    Mixed hyperlipidemia, Coronary artery disease due to lipid rich plaque       SACCHAROMYCES BOULARDII PO      Take by mouth daily        sertraline 50 MG tablet    ZOLOFT    180 tablet    TAKE 1 AND 1/2 TO 2 TABLETS BY MOUTH EVERY DAY    Dysthymia       sildenafil 100 MG tablet    VIAGRA     Take 100 mg by mouth daily as needed for erectile dysfunction . Take 30 minutes to 4 hours before sexual activity. Maximum of 100mg / 24 hours.        * sotalol 80 MG tablet    BETAPACE     Take 80 mg by mouth 2 times daily (before meals)        * sotalol 80 MG tablet     BETAPACE    180 tablet    Take 1 tablet (80 mg) by mouth 2 times daily    PVC's (premature ventricular contractions)       sucralfate 1 GM tablet    CARAFATE     Take 1 g by mouth 4 times daily (before meals and nightly) as needed for heartburn        * Notice:  This list has 4 medication(s) that are the same as other medications prescribed for you. Read the directions carefully, and ask your doctor or other care provider to review them with you.

## 2018-06-05 NOTE — PROGRESS NOTES
Nursing Notes:   Gardenia Childs LPN  6/5/2018 10:13 AM  Signed  Patient presents to the clinic for multiple concerns.  Patient denies the need for any refills at this time.      Gardenia Childs LPN 6/5/2018 10:02 AM      Nursing note reviewed with patient.  Accurracy and completeness verified.   Mr. Thornton is a 67 year old male who:  Patient presents with:  Clinic Care Coordination - Follow-up    HPI     ICD-10-CM    1. Coronary arteriosclerosis in native artery I25.10 Echocardiogram Complete     EKG 12-lead, tracing only   2. Regional wall motion abnormality of heart R93.1 Echocardiogram Complete     EKG 12-lead, tracing only   3. Pain in metatarsus of right foot M89.8X7 XR Foot Right G/E 3 Views   4. Benign essential hypertension I10    5. Mixed hyperlipidemia E78.2 Lipid Panel   6. Weak urine stream R39.12 Urinalysis w Reflex Microscopic If Positive     Prostate Specific Antigen GH     CBC and Differential     Comprehensive metabolic panel     UROLOGY ADULT REFERRAL   7. Feeling of incomplete bladder emptying R39.14 Urinalysis w Reflex Microscopic If Positive     UROLOGY ADULT REFERRAL   8. Food sticks on swallowing R13.10 XR Esophagram   9. Abdominal pain, epigastric R10.13 H Pylori antigen, stool     Coronary artery disease, had regional wall motion abnormality inferiorly.  Previous ejection fraction within the 40% range.  Echocardiogram 2 years ago, ejection fraction improved up to the 50% range.  He would like to get another echo.  Repeat EKG today.  He is on sotalol.    Right foot pain, metatarsal area.  Has been bothering him for quite a while.  He would like an x-ray.  We did discuss podiatry referral.    Hypertension, currently well controlled.  Tolerating medication.  Check labs.    Hyperlipidemia, continues on Crestor.  Seems to be tolerating well.    Weak urine stream, check urinalysis, PSA.  Urology referral.  States that he actually has had urinary incontinence issues before.    Food sticking when  swallowing, we discussed some evaluation and treatment options.  Potential diagnoses.  States that initially protein, inhibitors were quite helpful, now they are not really helping much.  Subsequently Carafate was very helpful.  Now that is not really working.  Pepto-Bismol has been used recently and does help.  He is wondering about potential infection.    States his wife got sick at the same time that he did and they have both been having a lot of gas and bloating and he has quite a bit of epigastric abdominal pain at times.  Check H. pylori stool antigen.  Esophageal x-ray.  Patient does not really radiate anywhere fairly localized.  This seem to be worse after foods at times.    Functional Capacity: > or about 4 METS.   Reports that he can climb a flight of stairs without any chest pain/heaviness or shortness of breath.   No orthopnea/paroxysmal nocturnal dyspnea  Review of Systems   Constitutional: Negative for chills, fatigue and fever.   HENT: Negative for congestion and hearing loss.    Eyes: Negative for pain and visual disturbance.   Respiratory: Negative for cough, shortness of breath and wheezing.    Cardiovascular: Negative for chest pain and palpitations.   Gastrointestinal: Positive for abdominal pain. Negative for diarrhea, nausea and vomiting.        + food sticks at times. + epigastric pains. peptobismol does help.   Carafate / PPI, TUMS don't help.     + stool incontinence in past couple days x1.     + alternating constipation/diarrhea.    Endocrine: Negative for cold intolerance and heat intolerance.   Genitourinary: Positive for frequency and urgency. Negative for dysuria and hematuria.        + urinary issues   Musculoskeletal: Negative for arthralgias and myalgias.   Skin: Negative for pallor.   Allergic/Immunologic: Negative for immunocompromised state.   Neurological: Negative for dizziness and light-headedness.   Hematological: Does not bruise/bleed easily.   Psychiatric/Behavioral:  Negative for agitation and confusion.        RYANNE:   RYANNE-7 SCORE 12/9/2016 11/22/2017 6/5/2018   Total Score 6 0 0     PHQ9:  PHQ-9 SCORE 12/9/2016 11/22/2017 6/5/2018   Total Score 12 0 0       I have personally reviewed the past medical history, past surgical history, medications, allergies, family and social history as listed below, on 6/5/2018.    Allergies   Allergen Reactions     Iodine Shortness Of Breath     Other reaction(s): Runny Nose  Watery eyes     Ticagrelor Nausea and Shortness Of Breath     Abdominal pain, bloating - diarrhea, possible cause of stomach bleeding     Lisinopril Cough     Metoprolol      Other reaction(s): Bradycardia       Current Outpatient Prescriptions   Medication Sig Dispense Refill     albuterol (PROAIR HFA/PROVENTIL HFA/VENTOLIN HFA) 108 (90 BASE) MCG/ACT Inhaler Inhale 2 puffs into the lungs 4 times daily as needed for cough or shortness of breath / dyspnea       ALPRAZolam (XANAX) 0.25 MG tablet Take 0.125 mg by mouth daily and as needed for anxiety.       aspirin EC 81 MG EC tablet Take 81 mg by mouth daily with food       clopidogrel (PLAVIX) 75 MG tablet Take 1 tablet (75 mg) by mouth daily 90 tablet 3     fish oil-omega-3 fatty acids 1000 MG capsule Take 500 mg by mouth       furosemide (LASIX) 20 MG tablet Take 0.5-1 tablets by mouth every morning as needed       losartan (COZAAR) 50 MG tablet Take 1 tablet (50 mg) by mouth daily 90 tablet 3     multivitamin, therapeutic with minerals (MULTI-VITAMIN) TABS Take 1 tablet by mouth daily       nitroGLYcerin (NITROSTAT) 0.4 MG sublingual tablet Place 0.4 mg under the tongue every 5 minutes as needed       Omega-3 Fatty Acids (OMEGA-3 FISH OIL PO) Take 500 mg by mouth       omeprazole (PRILOSEC) 20 MG CR capsule Take 20 mg by mouth daily as needed for GI upset.       rosuvastatin (CRESTOR) 40 MG tablet Take 1 tablet (40 mg) by mouth daily 90 tablet 3     SACCHAROMYCES BOULARDII PO Take by mouth daily       sertraline (ZOLOFT)  50 MG tablet TAKE 1 AND 1/2 TO 2 TABLETS BY MOUTH EVERY  tablet 3     sildenafil (VIAGRA) 100 MG tablet Take 100 mg by mouth daily as needed for erectile dysfunction . Take 30 minutes to 4 hours before sexual activity. Maximum of 100mg / 24 hours.       sotalol (BETAPACE) 80 MG tablet Take 1 tablet (80 mg) by mouth 2 times daily 180 tablet 3     sotalol (BETAPACE) 80 MG tablet Take 80 mg by mouth 2 times daily (before meals)       sucralfate (CARAFATE) 1 GM tablet Take 1 g by mouth 4 times daily (before meals and nightly) as needed for heartburn       [DISCONTINUED] clopidogrel (PLAVIX) 75 MG tablet Take 75 mg by mouth daily       [DISCONTINUED] losartan (COZAAR) 50 MG tablet Take 50 mg by mouth daily       [DISCONTINUED] rosuvastatin (CRESTOR) 40 MG tablet Take 40 mg by mouth At Bedtime       [DISCONTINUED] sertraline (ZOLOFT) 50 MG tablet Take 0.5-2 tablets by mouth daily          Patient Active Problem List    Diagnosis Date Noted     Food sticks on swallowing 06/05/2018     Priority: Medium     History of prediabetes 11/22/2017     Priority: Medium     Nocturia 11/22/2017     Priority: Medium     History of ST elevation myocardial infarction (STEMI) 08/23/2016     Priority: Medium     History of tobacco abuse 08/23/2016     Priority: Medium     ST elevation myocardial infarction (STEMI) (H) 08/11/2016     Priority: Medium     Chronic diastolic heart failure (H) 02/03/2016     Priority: Medium     Olecranon bursitis of left elbow 02/03/2016     Priority: Medium     Seasonal allergies 09/14/2015     Priority: Medium     PVC's (premature ventricular contractions) 08/13/2015     Priority: Medium     Bilateral leg edema 08/12/2014     Priority: Medium     Hx of right coronary artery stent placement 08/12/2014     Priority: Medium     Old inferior wall myocardial infarction 08/12/2014     Priority: Medium     ED (erectile dysfunction) 07/29/2013     Priority: Medium     Constipation 09/21/2011     Priority:  Medium     Malaise and fatigue 09/21/2011     Priority: Medium     Anxiety 04/22/2011     Priority: Medium     Coronary artery disease due to lipid rich plaque 04/22/2011     Priority: Medium     Overview:   Inferior Infarct June 2008 BMS placed to distal RCA  Non transmural MI march 2010.late stenosis to RCA OTTO placed at [facility]       GERD (gastroesophageal reflux disease) 04/22/2011     Priority: Medium     History of major depression 02/03/2011     Priority: Medium     Overview:   With somatization       Palpitations 02/03/2011     Priority: Medium     Ischemic heart disease, chronic 12/13/2010     Priority: Medium     Mixed hyperlipidemia 12/13/2010     Priority: Medium     Dysthymia 11/22/2010     Priority: Medium     Gastroesophageal reflux disease 11/22/2010     Priority: Medium     Benign essential hypertension 03/22/2010     Priority: Medium     Coronary arteriosclerosis in native artery 06/20/2008     Priority: Medium     Overview:   IMO Update 10/11       Past Medical History:   Diagnosis Date     Anxiety disorder     No Comments Provided     Atherosclerotic heart disease of native coronary artery without angina pectoris     Inferior Infarct June 2008 BMS placed to distal RCA Non transmural MI march 2010.late stenosis to RCA OTTO placed at [facility]     Chest pain     No Comments Provided     Chronic ischemic heart disease     No Comments Provided     Constipation     No Comments Provided     Epigastric pain     No Comments Provided     Essential (primary) hypertension     No Comments Provided     Gastro-esophageal reflux disease without esophagitis     No Comments Provided     Generalized anxiety disorder     No Comments Provided     Hyperlipidemia     No Comments Provided     Major depressive disorder, 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 2 and 1/2 packs of Camels for 40-45 years. Currently smokes a pipe.     Past Surgical History:   Procedure Laterality Date     ARTHROSCOPY KNEE      lateral release, left knee     BACK SURGERY      age 19,fractured coccyx     COLONOSCOPY      2011,Diverticulosis. No polyps.  Next due 2021.     ESOPHAGOSCOPY, GASTROSCOPY, DUODENOSCOPY (EGD), COMBINED      1/2/11,no HH, no GERD (one interpretation of this might be that the PPI has been successful in preventing injury.)     FINGER SURGERY      right hand repair     HEART CATH, ANGIOPLASTY      6/2008,   3/2009,right coronary stents     TONSILLECTOMY      age 7     Social History     Social History     Marital status:      Spouse name: Arielle     Number of children: N/A     Years of education: N/A     Social History Main Topics     Smoking status: Former Smoker     Packs/day: 0.00     Years: 45.00     Types: Pipe     Quit date: 1/1/2000     Smokeless tobacco: Current User     Types: Chew     Last attempt to quit: 1/1/1979      Comment: Quit smoking: -- No longer using cigarettes, occasionally smokes a pipe     Alcohol use No     Drug use: No     Sexual activity: Not Asked     Other Topics Concern     None     Social History Narrative    , lives with wife - Arielle  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     Family History   Problem Relation Age of Onset     HEART DISEASE Father      Heart Disease,CAD     CANCER Father      Cancer,Bladder     DIABETES Father      Diabetes     Family History Negative Brother      Good Health,x5     Family History Negative Sister      Good Health,x2       EXAM:   Vitals:    06/05/18 1004   BP: 124/70  "  BP Location: Right arm   Patient Position: Sitting   Cuff Size: Adult Regular   Pulse: 60   Weight: 263 lb (119.3 kg)       Current Pain Score: Severe Pain (7)     BP Readings from Last 3 Encounters:   06/05/18 124/70   11/22/17 128/74   12/09/16 126/80    Wt Readings from Last 3 Encounters:   06/05/18 263 lb (119.3 kg)   11/22/17 265 lb 2 oz (120.3 kg)   12/09/16 259 lb (117.5 kg)      Estimated body mass index is 38.01 kg/(m^2) as calculated from the following:    Height as of 11/22/17: 5' 9.75\" (1.772 m).    Weight as of this encounter: 263 lb (119.3 kg).     Physical Exam   Constitutional: He is oriented to person, place, and time. He appears well-developed and well-nourished. No distress.   HENT:   Head: Normocephalic and atraumatic.   Eyes: Conjunctivae are normal. No scleral icterus.   Neck: No thyromegaly present.   Cardiovascular: Normal rate and regular rhythm.    Pulmonary/Chest: Effort normal. No respiratory distress. He has no wheezes.   Abdominal: Soft. There is no tenderness.   Musculoskeletal: He exhibits no tenderness or deformity.   Lymphadenopathy:     He has no cervical adenopathy.   Neurological: He is alert and oriented to person, place, and time. No cranial nerve deficit.   Skin: Skin is warm and dry.   Psychiatric: He has a normal mood and affect.      INVESTIGATIONS:  Results for orders placed or performed in visit on 06/05/18   Urinalysis w Reflex Microscopic If Positive   Result Value Ref Range    Color Urine Yellow     Appearance Urine Clear     Glucose Urine Negative NEG^Negative mg/dL    Bilirubin Urine Negative NEG^Negative    Ketones Urine Trace (A) NEG^Negative mg/dL    Specific Gravity Urine >1.030 1.003 - 1.035    Blood Urine Negative NEG^Negative    pH Urine 6.0 5.0 - 7.0 pH    Protein Albumin Urine Negative NEG^Negative mg/dL    Urobilinogen Urine 1.0 0.2 - 1.0 EU/dL    Nitrite Urine Negative NEG^Negative    Leukocyte Esterase Urine Negative NEG^Negative    Source Midstream " Urine    Prostate Specific Antigen GH   Result Value Ref Range    Prostate Specific Antigen 1.586 <3.100 ng/mL   CBC and Differential   Result Value Ref Range    WBC 7.2 4.0 - 11.0 10e9/L    RBC Count 5.19 4.4 - 5.9 10e12/L    Hemoglobin 16.3 13.3 - 17.7 g/dL    Hematocrit 48.0 40.0 - 53.0 %    MCV 93 78 - 100 fl    MCH 31.4 26.5 - 33.0 pg    MCHC 34.0 31.5 - 36.5 g/dL    RDW 12.0 10.0 - 15.0 %    Platelet Count 325 150 - 450 10e9/L    Diff Method Automated Method     % Neutrophils 59.7 %    % Lymphocytes 27.1 %    % Monocytes 7.6 %    % Eosinophils 4.2 %    % Basophils 1.3 %    % Immature Granulocytes 0.1 %    Absolute Neutrophil 4.3 1.6 - 8.3 10e9/L    Absolute Lymphocytes 1.9 0.8 - 5.3 10e9/L    Absolute Monocytes 0.5 0.0 - 1.3 10e9/L    Absolute Eosinophils 0.3 0.0 - 0.7 10e9/L    Absolute Basophils 0.1 0.0 - 0.2 10e9/L    Abs Immature Granulocytes 0.0 0 - 0.4 10e9/L   Comprehensive metabolic panel   Result Value Ref Range    Sodium 134 134 - 144 mmol/L    Potassium 4.4 3.5 - 5.1 mmol/L    Chloride 100 98 - 107 mmol/L    Carbon Dioxide 29 21 - 31 mmol/L    Anion Gap 5 3 - 14 mmol/L    Glucose 103 70 - 105 mg/dL    Urea Nitrogen 13 7 - 25 mg/dL    Creatinine 0.94 0.70 - 1.30 mg/dL    GFR Estimate 81 >60 mL/min/1.7m2    GFR Estimate If Black >90 >60 mL/min/1.7m2    Calcium 9.4 8.6 - 10.3 mg/dL    Bilirubin Total 0.3 0.3 - 1.0 mg/dL    Albumin 4.0 3.5 - 5.7 g/dL    Protein Total 6.5 6.4 - 8.9 g/dL    Alkaline Phosphatase 60 34 - 104 U/L    ALT 12 7 - 52 U/L    AST 16 13 - 39 U/L   Lipid Panel   Result Value Ref Range    Cholesterol 149 <200 mg/dL    Triglycerides 110 <150 mg/dL    HDL Cholesterol 46 23 - 92 mg/dL    LDL Cholesterol Calculated 81 <100 mg/dL    Non HDL Cholesterol 103 <130 mg/dL   EKG 12-lead, tracing only    Impression    EKG interpretation  Sinus bradycardia.  Rate 58.  Normal axis.  Inferior infarction, age undetermined.  Flattened T waves laterally.  Abnormal EKG.  Compared to previous tracing  dated 10/17/2016, bradycardia is new.  Flattened T waves in I and aVL are new.  Edenilson Adams MD     6/5/2018 EKG interpretation  Sinus bradycardia.  Rate 58.  Normal axis.  Inferior infarction, age undetermined.  Flattened T waves laterally.  Abnormal EKG.  Compared to previous tracing dated 10/17/2016, bradycardia is new.  Flattened T waves in I and aVL are new.    ASSESSMENT AND PLAN:  Problem List Items Addressed This Visit        Digestive    Food sticks on swallowing    Relevant Orders    XR Esophagram       Endocrine    Mixed hyperlipidemia    Relevant Orders    Lipid Panel (Completed)       Circulatory    Coronary arteriosclerosis in native artery - Primary    Relevant Orders    Echocardiogram Complete    EKG 12-lead, tracing only (Completed)    Benign essential hypertension      Other Visit Diagnoses     Regional wall motion abnormality of heart        Relevant Orders    Echocardiogram Complete    EKG 12-lead, tracing only (Completed)    Pain in metatarsus of right foot        Relevant Orders    XR Foot Right G/E 3 Views (Completed)    Weak urine stream        Relevant Orders    Urinalysis w Reflex Microscopic If Positive (Completed)    Prostate Specific Antigen GH (Completed)    CBC and Differential (Completed)    Comprehensive metabolic panel (Completed)    UROLOGY ADULT REFERRAL    Feeling of incomplete bladder emptying        Relevant Orders    Urinalysis w Reflex Microscopic If Positive (Completed)    UROLOGY ADULT REFERRAL    Abdominal pain, epigastric        Relevant Orders    H Pylori antigen, stool        reviewed diet, exercise and weight control  -- Expected clinical course discussed    -- Medications and their side effects discussed    Patient Instructions   1. Coronary arteriosclerosis in native artery  2. Regional wall motion abnormality of heart  - Echocardiogram Complete; Future  - they will call with date/time of appointment.    - EKG 12-lead, tracing only    3. Pain in metatarsus of  right foot  - XR Foot Right G/E 3 Views; Future    4. Benign essential hypertension  - labs today.     5. Mixed hyperlipidemia  - Lipid Panel    6. Weak urine stream  - Urinalysis w Reflex Microscopic If Positive  - Prostate Specific Antigen GH  - CBC and Differential  - Comprehensive metabolic panel  - UROLOGY ADULT REFERRAL    7. Feeling of incomplete bladder emptying  - Urinalysis w Reflex Microscopic If Positive  - UROLOGY ADULT REFERRAL    Urology clinic referral was sent - Dr. Marcial - they will call with date/time of appointment.      8. Food sticks on swallowing  - XR Esophagram; Future  - they will call with date/time of appointment.      9. Abdominal pain, epigastric  - H Pylori antigen, stool; Future      Edenilson Adams MD  Internal Medicine  [facility]

## 2018-06-05 NOTE — NURSING NOTE
Patient presents to the clinic for multiple concerns.  Patient denies the need for any refills at this time.      Gardenia Childs LPN 6/5/2018 10:02 AM

## 2018-06-05 NOTE — PATIENT INSTRUCTIONS
1. Coronary arteriosclerosis in native artery  2. Regional wall motion abnormality of heart  - Echocardiogram Complete; Future  - they will call with date/time of appointment.    - EKG 12-lead, tracing only    3. Pain in metatarsus of right foot  - XR Foot Right G/E 3 Views; Future    4. Benign essential hypertension  - labs today.     5. Mixed hyperlipidemia  - Lipid Panel    6. Weak urine stream  - Urinalysis w Reflex Microscopic If Positive  - Prostate Specific Antigen GH  - CBC and Differential  - Comprehensive metabolic panel  - UROLOGY ADULT REFERRAL    7. Feeling of incomplete bladder emptying  - Urinalysis w Reflex Microscopic If Positive  - UROLOGY ADULT REFERRAL    Urology clinic referral was sent - Dr. Marcial - they will call with date/time of appointment.      8. Food sticks on swallowing  - XR Esophagram; Future  - they will call with date/time of appointment.      9. Abdominal pain, epigastric  - H Pylori antigen, stool; Future

## 2018-06-06 ASSESSMENT — ANXIETY QUESTIONNAIRES: GAD7 TOTAL SCORE: 0

## 2018-06-06 ASSESSMENT — PATIENT HEALTH QUESTIONNAIRE - PHQ9: SUM OF ALL RESPONSES TO PHQ QUESTIONS 1-9: 0

## 2018-06-20 ENCOUNTER — HOSPITAL ENCOUNTER (OUTPATIENT)
Dept: CARDIOLOGY | Facility: OTHER | Age: 68
Discharge: HOME | End: 2018-06-20
Attending: INTERNAL MEDICINE | Admitting: INTERNAL MEDICINE
Payer: COMMERCIAL

## 2018-06-20 ENCOUNTER — HOSPITAL ENCOUNTER (OUTPATIENT)
Dept: GENERAL RADIOLOGY | Facility: OTHER | Age: 68
End: 2018-06-20
Attending: INTERNAL MEDICINE
Payer: COMMERCIAL

## 2018-06-20 DIAGNOSIS — R93.1 REGIONAL WALL MOTION ABNORMALITY OF HEART: ICD-10-CM

## 2018-06-20 DIAGNOSIS — I25.10 CORONARY ARTERIOSCLEROSIS IN NATIVE ARTERY: ICD-10-CM

## 2018-06-20 DIAGNOSIS — R13.10 FOOD STICKS ON SWALLOWING: ICD-10-CM

## 2018-06-20 DIAGNOSIS — R10.13 ABDOMINAL PAIN, EPIGASTRIC: ICD-10-CM

## 2018-06-20 PROCEDURE — 93306 TTE W/DOPPLER COMPLETE: CPT

## 2018-06-20 PROCEDURE — 87338 HPYLORI STOOL AG IA: CPT | Performed by: INTERNAL MEDICINE

## 2018-06-20 PROCEDURE — 74220 X-RAY XM ESOPHAGUS 1CNTRST: CPT

## 2018-06-20 PROCEDURE — 93306 TTE W/DOPPLER COMPLETE: CPT | Mod: 26 | Performed by: INTERNAL MEDICINE

## 2018-06-20 PROCEDURE — 25500045 ZZH RX 255: Performed by: INTERNAL MEDICINE

## 2018-06-20 RX ADMIN — ANTACID/ANTIFLATULENT 4 G: 380; 550; 10; 10 GRANULE, EFFERVESCENT ORAL at 10:21

## 2018-06-21 LAB
H PYLORI AG STL QL IA: NORMAL
SPECIMEN SOURCE: NORMAL

## 2018-06-25 ENCOUNTER — TELEPHONE (OUTPATIENT)
Dept: INTERNAL MEDICINE | Facility: OTHER | Age: 68
End: 2018-06-25

## 2018-06-25 NOTE — TELEPHONE ENCOUNTER
Informed patient that results have not been completed as of today. Patient is still having issues. Had to even leave work because it got so bad. Patient was wondering about Cyclospora? He has discovered that there has been an outbreak and they are blaming the fresh veggie trays. They have them very often in there home.   Nieves Hernandez CMA..............6/25/2018........9:21 AM

## 2018-06-26 NOTE — TELEPHONE ENCOUNTER
Noted.  We could check stool culture and ova/parasites if he would like.  Would need to bring in another stool sample.  --If he wants to proceed, we need to put in orders.  Edenilson Adams MD

## 2018-06-26 NOTE — TELEPHONE ENCOUNTER
Notified patient of response per Edenilson Adams MD and will call if symptoms do not improve.  He is waiting to hear results from ECHO and Esophagram.  Nancy Harmon LPN............6/26/2018 8:27 AM

## 2018-06-27 NOTE — TELEPHONE ENCOUNTER
Called patient with his Echo results as noted by Dr. Adams.  Gardenia Hampton LPN .......6/27/2018 10:29 AM

## 2018-06-27 NOTE — TELEPHONE ENCOUNTER
Noted.     ECHO looks pretty good     Esophogram:  IMPRESSION:   1. Large amount of gastroesophageal reflux.  2. Mild esophageal dysmotility.      - letter sent.     Edenilson Adams MD

## 2018-07-23 NOTE — PROGRESS NOTES
Patient Information     Patient Name  Héctor Thornton MRN  [id number] Sex  Male   1950      Letter by Edenilson Adams MD at      Author:  Edenilson Adams MD Service:  (none) Author Type:  (none)    Filed:   Encounter Date:  2017 Status:  (Other)           Héctor Thornton  [address] [address]          November 15, 2017        Dear Mr. Thornton:    This letter is to remind you that you are due for your annual exam with Edenilson Adams MD . Your last comprehensive medication visit was more than 12 months ago on 11-.     A LIMITED refill of Sertraline was sent into your pharmacy.   Additional refills require an annual medication management appointment with Edenilson Adams MD. Please call the clinic at [phone] to schedule your appointment.    Please call to inform us if you no longer see Edenilson Adams MD for primary care, doing so will remove you from our call/contact list.    Thank you,        The Refill Nurse  [facility]

## 2018-07-24 NOTE — PROGRESS NOTES
Patient Information     Patient Name  Héctor Thornton MRN  [id number] Sex  Male   1950      Letter by Edenilson Adams MD at      Author:  Edenilson Adams MD Service:  (none) Author Type:  (none)    Filed:   Encounter Date:  2017 Status:  (Other)           Héctor Thornton  [address] [facility] [address]          2017      CERTIFICATE TO RETURN TO WORK      Héctor Thornton has been under my care 2017 and is able to return to work without restriction today.      Remarks: No restrictions.     Sincerely,  Edenilson Adams MD ....................  2017   8:17 AM

## 2018-07-24 NOTE — PROGRESS NOTES
Patient Information     Patient Name  Héctor Thornton MRN  [id number] Sex  Male   1950      Letter by Edenilson Admas MD at      Author:  Edenilson Adams MD Service:  (none) Author Type:  (none)    Filed:   Encounter Date:  2017 Status:  (Other)           Héctor Thornton  [address] [facility] [address]          2017    Dear Mr. Thornton:    Following are the tests completed during your last clinic visit.  The results of these tests are included below.      Labs look very good.  Continue current medications.    Previous glucose levels (blood sugar) were a little elevated --- check an A1c today.     Hemoglobin A1c is a test for prediabetes and diabetes.   The normal range is up to 5.6%.    Between 5.7 and 6.4% indicates prediabetes.   6.5% and higher indicates Diabetes.     HEMOGLOBIN A1C MONITORING (POCT) (%)    Date Value   2017 5.6     HEMOGLOBIN A1C GIH (%)    Date Value   2011 5.7        As you can see, you fall in the Normal range.          Results for orders placed or performed in visit on 17      COMPLETE METABOLIC PANEL      Result  Value Ref Range    SODIUM 134 133 - 143 mmol/L    POTASSIUM 4.8 3.5 - 5.1 mmol/L    CHLORIDE 99 98 - 107 mmol/L    CO2,TOTAL 27 21 - 31 mmol/L    ANION GAP 8 5 - 18                    GLUCOSE 109 (H) 70 - 105 mg/dL    CALCIUM 9.5 8.6 - 10.3 mg/dL    BUN 13 7 - 25 mg/dL    CREATININE 0.99 0.70 - 1.30 mg/dL    BUN/CREAT RATIO           13                    GFR if African American >60 >60 ml/min/1.73m2    GFR if not African American >60 >60 ml/min/1.73m2    ALBUMIN 4.4 3.5 - 5.7 g/dL    PROTEIN,TOTAL 6.9 6.4 - 8.9 g/dL    GLOBULIN                  2.5 2.0 - 3.7 g/dL    A/G RATIO 1.8 1.0 - 2.0                    BILIRUBIN,TOTAL 0.6 0.3 - 1.0 mg/dL    ALK PHOSPHATASE 69 34 - 104 IU/L    ALT (SGPT) 16 7 - 52 IU/L    AST (SGOT) 19 13 - 39 IU/L   LIPID PANEL      Result  Value Ref Range    CHOLESTEROL,TOTAL 158 <200 mg/dL     TRIGLYCERIDES 115 <150 mg/dL    HDL CHOLESTEROL 59 23 - 92 mg/dL    NON-HDL CHOLESTEROL 99 <145 mg/dl    CHOL/HDL RATIO            2.68 <4.50                    LDL CHOLESTEROL 76 <100 mg/dL    PROVIDER ORDERED STATUS FASTING    Hgb A1c      Result  Value Ref Range    HEMOGLOBIN A1C MONITORING (POCT) 5.6 4.0 - 6.2 %    ESTIMATED AVERAGE GLUCOSE  114 mg/dL   PSA, TOTAL      Result  Value Ref Range    PSA TOTAL (DIAGNOSTIC) 1.910 <=3.100 ng/mL   CBC WITH AUTO DIFFERENTIAL      Result  Value Ref Range    WHITE BLOOD COUNT         7.2 4.5 - 11.0 thou/cu mm    RED BLOOD COUNT           5.29 4.30 - 5.90 mil/cu mm    HEMOGLOBIN                16.8 13.5 - 17.5 g/dL    HEMATOCRIT                48.4 37.0 - 53.0 %    MCV                       92 80 - 100 fL    MCH                       31.8 26.0 - 34.0 pg    MCHC                      34.7 32.0 - 36.0 g/dL    RDW                       12.0 11.5 - 15.5 %    PLATELET COUNT            303 140 - 440 thou/cu mm    MPV                       9.2 6.5 - 11.0 fL    NEUTROPHILS               61.3 42.0 - 72.0 %    LYMPHOCYTES               24.4 20.0 - 44.0 %    MONOCYTES                 8.7 <12.0 %    EOSINOPHILS               3.9 <8.0 %    BASOPHILS                 1.4 <3.0 %    IMMATURE GRANULOCYTES(METAS,MYELOS,PROS) 0.3 %    ABSOLUTE NEUTROPHILS      4.4 1.7 - 7.0 thou/cu mm    ABSOLUTE LYMPHOCYTES      1.8 0.9 - 2.9 thou/cu mm    ABSOLUTE MONOCYTES        0.6 <0.9 thou/cu mm    ABSOLUTE EOSINOPHILS      0.3 <0.5 thou/cu mm    ABSOLUTE BASOPHILS        0.1 <0.3 thou/cu mm    ABSOLUTE IMMATURE GRANULOCYTES(METAS,MYELOS,PROS) 0.0 <=0.3 thou/cu mm   URINALYSIS W REFLEX MICROSCOPIC IF POSITIVE      Result  Value Ref Range    COLOR                     Yellow Yellow Color    CLARITY                   Clear Clear Clarity    SPECIFIC GRAVITY,URINE    1.015 1.010, 1.015, 1.020, 1.025                    PH,URINE                  7.0 6.0, 7.0, 8.0, 5.5, 6.5, 7.5, 8.5                     UROBILINOGEN,QUALITATIVE  Normal Normal EU/dl    PROTEIN, URINE Negative Negative mg/dL    GLUCOSE, URINE Negative Negative mg/dL    KETONES,URINE             Negative Negative mg/dL    BILIRUBIN,URINE           Negative Negative                    OCCULT BLOOD,URINE        Negative Negative                    NITRITE                   Negative Negative                    LEUKOCYTE ESTERASE        Negative Negative                         If you have any further questions or problems contact my office at  [phone]   --- or send [employer] message --- otherwise schedule an appointment.    Clinic : [phone]  Appointment line: [phone]     Thank you,    Edenilson Adams MD    Internal Medicine  [facility]     Reviewed and electronically signed by provider.

## 2018-08-02 ENCOUNTER — OFFICE VISIT (OUTPATIENT)
Dept: UROLOGY | Facility: OTHER | Age: 68
End: 2018-08-02
Attending: INTERNAL MEDICINE
Payer: COMMERCIAL

## 2018-08-02 VITALS — RESPIRATION RATE: 20 BRPM | BODY MASS INDEX: 37.66 KG/M2 | HEART RATE: 56 BPM | WEIGHT: 260.6 LBS

## 2018-08-02 DIAGNOSIS — R39.198 SLOWING OF URINARY STREAM: Primary | ICD-10-CM

## 2018-08-02 PROCEDURE — 51798 US URINE CAPACITY MEASURE: CPT | Performed by: UROLOGY

## 2018-08-02 PROCEDURE — 99203 OFFICE O/P NEW LOW 30 MIN: CPT | Mod: 25 | Performed by: UROLOGY

## 2018-08-02 RX ORDER — TAMSULOSIN HYDROCHLORIDE 0.4 MG/1
0.4 CAPSULE ORAL EVERY EVENING
Qty: 90 CAPSULE | Refills: 3 | Status: SHIPPED | OUTPATIENT
Start: 2018-08-02 | End: 2018-10-12

## 2018-08-02 ASSESSMENT — PAIN SCALES - GENERAL: PAINLEVEL: MILD PAIN (3)

## 2018-08-02 NOTE — MR AVS SNAPSHOT
After Visit Summary   8/2/2018    Hétcor Thornton    MRN: [id number]           Patient Information     Date Of Birth          1950        Visit Information        Provider Department      8/2/2018 11:00 AM Federico Marcial MD [facility]        Today's Diagnoses     Slowing of urinary stream    -  1       Follow-ups after your visit        Your next 10 appointments already scheduled     Aug 30, 2018  9:30 AM CDT   Return Visit with Federico Marcial MD   [facility] ([facility])    [address]  [place] [address] [phone]              Who to contact     If you have questions or need follow up information about today's clinic visit or your schedule please contact [facility] directly at [phone].  Normal or non-critical lab and imaging results will be communicated to you by [employer], letter or phone within 4 business days after the clinic has received the results. If you do not hear from us within 7 days, please contact the clinic through [employer] or phone. If you have a critical or abnormal lab result, we will notify you by phone as soon as possible.  Submit refill requests through "[employer] or call your pharmacy and they will forward the refill request to us. Please allow 3 business days for your refill to be completed.          Additional Information About Your Visit        MyChart Information     "[employer] gives you secure access to your electronic health record. If you see a primary care provider, you can also send messages to your care team and make appointments. If you have questions, please call your primary care clinic.  If you do not have a primary care provider, please call [phone] and they will assist you.        Care EveryWhere ID     This is your Care EveryWhere ID. This could be used by other organizations to access your [facility] medical records  [id number]        Your Vitals  Were     Pulse Respirations BMI (Body Mass Index)             56 20 37.66 kg/m2          Blood Pressure from Last 3 Encounters:   06/05/18 124/70   11/22/17 128/74   12/09/16 126/80    Weight from Last 3 Encounters:   08/02/18 118.2 kg (260 lb 9.6 oz)   06/05/18 119.3 kg (263 lb)   11/22/17 120.3 kg (265 lb 2 oz)              Today, you had the following     No orders found for display         Today's Medication Changes          These changes are accurate as of 8/2/18  3:15 PM.  If you have any questions, ask your nurse or doctor.               Start taking these medicines.        Dose/Directions    tamsulosin 0.4 MG capsule   Commonly known as:  FLOMAX   Used for:  Slowing of urinary stream   Started by:  Federico Marcial MD        Dose:  0.4 mg   Take 1 capsule (0.4 mg) by mouth every evening   Quantity:  90 capsule   Refills:  3            Where to get your medicines      These medications were sent to [facility] [address] [place] [address] [address]  AT [facility] [address]  [address], [facility] [address]     Phone:  [phone]     tamsulosin 0.4 MG capsule                Primary Care Provider Office Phone # Fax #    Edenilson Adams [phone]       [address] [facility] [address]        Equal Access to Services     [facility] AH: Hadii sweta mujica hadgeneo Somichael, waaxda luqadaha, qaybta kaalmada paulo, eugenie crews . So [facility] [phone].    ATENCIÓN: Si habla español, tiene a fournier disposición servicios gratuitos de asistencia lingüística. Llame al [phone].    We comply with applicable federal civil rights laws and [place] laws. We do not discriminate on the basis of race, color, national origin, age, disability, sex, sexual orientation, or gender identity.            Thank you!     Thank you for choosing [facility]  for your care. Our goal is always to provide you with excellent care. Hearing back from our  patients is one way we can continue to improve our services. Please take a few minutes to complete the written survey that you may receive in the mail after your visit with us. Thank you!             Your Updated Medication List - Protect others around you: Learn how to safely use, store and throw away your medicines at www.disposemymeds.org.          This list is accurate as of 8/2/18  3:15 PM.  Always use your most recent med list.                   Brand Name Dispense Instructions for use Diagnosis    albuterol 108 (90 Base) MCG/ACT Inhaler    PROAIR HFA/PROVENTIL HFA/VENTOLIN HFA     Inhale 2 puffs into the lungs 4 times daily as needed for cough or shortness of breath / dyspnea        ALPRAZolam 0.25 MG tablet    XANAX     Take 0.125 mg by mouth daily and as needed for anxiety.        aspirin 81 MG EC tablet      Take 81 mg by mouth daily with food        clopidogrel 75 MG tablet    PLAVIX    90 tablet    Take 1 tablet (75 mg) by mouth daily    Coronary artery disease due to lipid rich plaque       furosemide 20 MG tablet    LASIX     Take 0.5-1 tablets by mouth every morning as needed        losartan 50 MG tablet    COZAAR    90 tablet    Take 1 tablet (50 mg) by mouth daily    Benign essential hypertension       Multi-vitamin Tabs tablet      Take 1 tablet by mouth daily        nitroGLYcerin 0.4 MG sublingual tablet    NITROSTAT     Place 0.4 mg under the tongue every 5 minutes as needed        * OMEGA-3 FISH OIL PO      Take 500 mg by mouth        * fish oil-omega-3 fatty acids 1000 MG capsule      Take 500 mg by mouth        omeprazole 20 MG CR capsule    priLOSEC     Take 20 mg by mouth daily as needed for GI upset.        rosuvastatin 40 MG tablet    CRESTOR    90 tablet    Take 1 tablet (40 mg) by mouth daily    Mixed hyperlipidemia, Coronary artery disease due to lipid rich plaque       SACCHAROMYCES BOULARDII PO      Take by mouth daily        sertraline 50 MG tablet    ZOLOFT    180 tablet    TAKE 1  AND 1/2 TO 2 TABLETS BY MOUTH EVERY DAY    Dysthymia       sildenafil 100 MG tablet    VIAGRA     Take 100 mg by mouth daily as needed for erectile dysfunction . Take 30 minutes to 4 hours before sexual activity. Maximum of 100mg / 24 hours.        * sotalol 80 MG tablet    BETAPACE     Take 80 mg by mouth 2 times daily (before meals)        * sotalol 80 MG tablet    BETAPACE    180 tablet    Take 1 tablet (80 mg) by mouth 2 times daily    PVC's (premature ventricular contractions)       sucralfate 1 GM tablet    CARAFATE     Take 1 g by mouth 4 times daily (before meals and nightly) as needed for heartburn        tamsulosin 0.4 MG capsule    FLOMAX    90 capsule    Take 1 capsule (0.4 mg) by mouth every evening    Slowing of urinary stream       * Notice:  This list has 4 medication(s) that are the same as other medications prescribed for you. Read the directions carefully, and ask your doctor or other care provider to review them with you.

## 2018-08-02 NOTE — PROGRESS NOTES
I was asked to see this patient by Dr Adams and provide my opinion about the following:  Weak stream.    Type of Visit  Consult    Chief Complaint  Weak stream    HPI  Mr. Thornton is a 67 year old male who presents with weak stream.  Started 1 year ago.  Moderate bother.  Tried OTC herbals, no Rx meds.  No hematuria or dysuria.      Past Medical History  He  has a past medical history of Anxiety disorder; Atherosclerotic heart disease of native coronary artery without angina pectoris; Chest pain; Chronic ischemic heart disease; Constipation; Epigastric pain; Essential (primary) hypertension; Gastro-esophageal reflux disease without esophagitis; Generalized anxiety disorder; Hyperlipidemia; Major depressive disorder, recurrent severe without psychotic features (H); Major depressive disorder, single episode; Male erectile dysfunction; Other fatigue; Other injury of unspecified body region, initial encounter (CODE); Palpitations; Personal history of other endocrine, nutritional and metabolic disease; and Tobacco use.  Patient Active Problem List   Diagnosis     Anxiety     Bilateral leg edema     Chronic diastolic heart failure (H)     Constipation     Coronary artery disease due to lipid rich plaque     Coronary arteriosclerosis in native artery     Dysthymia     ED (erectile dysfunction)     Gastroesophageal reflux disease     Malaise and fatigue     GERD (gastroesophageal reflux disease)     History of major depression     History of prediabetes     History of ST elevation myocardial infarction (STEMI)     History of tobacco abuse     Hx of right coronary artery stent placement     Benign essential hypertension     Ischemic heart disease, chronic     Mixed hyperlipidemia     Nocturia     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    Medications  He has a current medication list which includes the following prescription(s): albuterol, alprazolam, aspirin, clopidogrel, fish oil-omega-3 fatty acids, furosemide, losartan, multivitamin, therapeutic with minerals, nitroglycerin, omega-3 fatty acids, omeprazole, rosuvastatin, saccharomyces boulardii, sertraline, sildenafil, sotalol, sotalol, and sucralfate.    Allergies  Allergies   Allergen Reactions     Iodine Shortness Of Breath     Other reaction(s): Runny Nose  Watery eyes     Ticagrelor Nausea and Shortness Of Breath     Abdominal pain, bloating - diarrhea, possible cause of stomach bleeding     Lisinopril Cough     Metoprolol      Other reaction(s): Bradycardia       Social History  He  reports that he quit smoking about 18 years ago. His smoking use included Pipe. He smoked 0.00 packs per day for 45.00 years. His smokeless tobacco use includes Chew. He reports that he does not drink alcohol or use illicit drugs.  No drug abuse.    Family History  Family History   Problem Relation Age of Onset     HEART DISEASE Father      Heart Disease,CAD     Cancer Father      Cancer,Bladder     Diabetes Father      Diabetes     Family History Negative Brother      Good Health,x5     Family History Negative Sister      Good Health,x2       Review of Systems  I personally reviewed the ROS with the patient.    Nursing Notes:   Marguerite Pena LPN  8/2/2018 12:00 PM  Signed  Patient is here today for a consult for a weak urine stream. Marguerite Pena LPN......................8/2/2018 11:38 AM      Review of Systems:    Weight loss:    YES     Recent fever/chills:  YES   Night sweats:   No  Current skin rash:  No   Recent hair loss:  No  Heat  intolerance:  No   Cold intolerance:  No  Chest pain:   No   Palpitations:   No  Shortness of breath:  No   Wheezing:   YES  Constipation:    YES   Diarrhea:   YES   Nausea:   YES   Vomiting:   No   Kidney/side pain:  No   Back pain:   No  Frequent headaches:  No   Dizziness:     No  Leg swelling:   YES   Calf pain:    YES    Parents, brothers or sisters with history of kidney cancer:   No  Parents, brothers or sisters with history of bladder cancer: YES father   Father or brother with history of prostate cancer:  No  Post-Void Residual  A post-void residual was measured by ultrasonic bladder scanner.  151 mL      Physical Exam  Vitals:    08/02/18 1140   Pulse: 56   Resp: 20   Weight: 118.2 kg (260 lb 9.6 oz)     Constitutional: No acute distress.  Alert and cooperative   Head: NCAT  Eyes: Conjunctivae normal  Cardiovascular: Regular rate.  Pulmonary/Chest: Respirations are even and non-labored bilaterally, no audible wheezing  Abdominal: Soft. No distension, tenderness, masses or guarding.   Neurological: A + O x 3.  Cranial Nerves II-XII grossly intact.  Extremities: ROSALINE x 4, Warm. No clubbing.  No cyanosis.    Skin: Pink, warm and dry.  No visible rashes noted.  Psychiatric:  Normal mood and affect  Back:  No left CVA tenderness.  No right CVA tenderness.  Genitourinary:  Nonpalpable bladder    Labs  Results for ANITHA HAMLIN (MRN [id number]) as of 8/2/2018 12:00   11/22/2017 09:41   PSA Total 1.910     Results for ANITHA HAMLIN (MRN [id number]) as of 8/2/2018 12:00   6/5/2018 11:03   Color Urine Yellow   Appearance Urine Clear   Glucose Urine Negative   Bilirubin Urine Negative   Ketones Urine Trace (A)   Specific Gravity Urine >1.030   pH Urine 6.0   Protein Albumin Urine Negative   Urobilinogen Urine 1.0   Nitrite Urine Negative   Blood Urine Negative   Leukocyte Esterase Urine Negative   Source Midstream Urine     Post-Void Residual  A post-void residual was measured by ultrasonic bladder scanner.  151  mL    Assessment  Mr. Thornton is a 67 year old male who presents with weak urinary stream.    We discussed side effects of Flomax including, but not limited to, retrograde ejaculation, congestion and lightheadedness.    Plan  Start Flomax 0.4mg every evening  Follow up in 1 month

## 2018-08-02 NOTE — NURSING NOTE
Patient is here today for a consult for a weak urine stream. Marguerite Pena CELINA......................8/2/2018 11:38 AM      Review of Systems:    Weight loss:    YES     Recent fever/chills:  YES   Night sweats:   No  Current skin rash:  No   Recent hair loss:  No  Heat intolerance:  No   Cold intolerance:  No  Chest pain:   No   Palpitations:   No  Shortness of breath:  No   Wheezing:   YES  Constipation:    YES   Diarrhea:   YES   Nausea:   YES   Vomiting:   No   Kidney/side pain:  No   Back pain:   No  Frequent headaches:  No   Dizziness:     No  Leg swelling:   YES   Calf pain:    YES    Parents, brothers or sisters with history of kidney cancer:   No  Parents, brothers or sisters with history of bladder cancer: YES father   Father or brother with history of prostate cancer:  No  Post-Void Residual  A post-void residual was measured by ultrasonic bladder scanner.  151 mL

## 2018-08-30 ENCOUNTER — OFFICE VISIT (OUTPATIENT)
Dept: UROLOGY | Facility: OTHER | Age: 68
End: 2018-08-30
Attending: UROLOGY
Payer: COMMERCIAL

## 2018-08-30 VITALS
SYSTOLIC BLOOD PRESSURE: 130 MMHG | RESPIRATION RATE: 16 BRPM | DIASTOLIC BLOOD PRESSURE: 80 MMHG | WEIGHT: 265.4 LBS | BODY MASS INDEX: 38.35 KG/M2

## 2018-08-30 DIAGNOSIS — R35.1 NOCTURIA: Primary | ICD-10-CM

## 2018-08-30 PROCEDURE — 99213 OFFICE O/P EST LOW 20 MIN: CPT | Mod: 25 | Performed by: UROLOGY

## 2018-08-30 PROCEDURE — 51798 US URINE CAPACITY MEASURE: CPT | Performed by: UROLOGY

## 2018-08-30 RX ORDER — TAMSULOSIN HYDROCHLORIDE 0.4 MG/1
0.8 CAPSULE ORAL EVERY EVENING
Qty: 180 CAPSULE | Refills: 3 | Status: SHIPPED | OUTPATIENT
Start: 2018-08-30 | End: 2019-09-17

## 2018-08-30 ASSESSMENT — PAIN SCALES - GENERAL: PAINLEVEL: NO PAIN (0)

## 2018-08-30 NOTE — NURSING NOTE
Pt presents to clinic for a one month follow up, currently on Flomax    Review of Systems:    Weight loss:    Yes     Recent fever/chills:  Yes   Night sweats:   No  Current skin rash:  No   Recent hair loss:  No  Heat intolerance:  No   Cold intolerance:  No  Chest pain:   No   Palpitations:   No  Shortness of breath:  Yes   Wheezing:   Yes  Constipation:    Yes   Diarrhea:   No   Nausea:   No   Vomiting:   No   Kidney/side pain:  No   Back pain:   No  Frequent headaches:  No   Dizziness:     No  Leg swelling:   Yes   Calf pain:    Yes      Post-Void Residual  A post-void residual was measured by ultrasonic bladder scanner.  11 mL

## 2018-08-30 NOTE — PROGRESS NOTES
Type of Visit  EST    Chief Complaint  Weak stream    HPI  Mr. Thornton is a 67 year old male who follows up with weak stream.  Patient started Flomax 0.4 mg once daily about 1 month ago.  His symptoms started about a year ago.  Since starting the medication he reports significant improvement that last about 12 hours throughout the day.  He denies side effects such as lightheadedness or congestion.  No hematuria or dysuria.      Family History  Family History   Problem Relation Age of Onset     HEART DISEASE Father      Heart Disease,CAD     Cancer Father      Cancer,Bladder     Diabetes Father      Diabetes     Family History Negative Brother      Good Health,x5     Family History Negative Sister      Good Health,x2       Review of Systems  I personally reviewed the ROS with the patient.    Nursing Notes:   Michelle Tabor LPN  8/30/2018  9:30 AM  Signed  Pt presents to clinic for a one month follow up, currently on Flomax    Review of Systems:    Weight loss:    Yes     Recent fever/chills:  Yes   Night sweats:   No  Current skin rash:  No   Recent hair loss:  No  Heat intolerance:  No   Cold intolerance:  No  Chest pain:   No   Palpitations:   No  Shortness of breath:  Yes   Wheezing:   Yes  Constipation:    Yes   Diarrhea:   No   Nausea:   No   Vomiting:   No   Kidney/side pain:  No   Back pain:   No  Frequent headaches:  No   Dizziness:     No  Leg swelling:   Yes   Calf pain:    Yes      Post-Void Residual  A post-void residual was measured by ultrasonic bladder scanner.  11 mL      Physical Exam  Vitals:    08/30/18 0916   BP: 130/80   BP Location: Left arm   Patient Position: Sitting   Cuff Size: Adult Large   Resp: 16   Weight: 120.4 kg (265 lb 6.4 oz)     Constitutional: No acute distress.  Alert and cooperative   Head: NCAT  Eyes: Conjunctivae normal  Cardiovascular: Regular rate.  Pulmonary/Chest: Respirations are even and non-labored bilaterally, no audible wheezing  Abdominal: Soft. No distension,  tenderness, masses or guarding.   Neurological: A + O x 3.  Cranial Nerves II-XII grossly intact.  Extremities: ROSALINE x 4, Warm. No clubbing.  No cyanosis.    Skin: Pink, warm and dry.  No visible rashes noted.  Psychiatric:  Normal mood and affect  Back:  No left CVA tenderness.  No right CVA tenderness.  Genitourinary:  Nonpalpable bladder    Labs  Results for ANITHA HAMLIN (MRN [id number]) as of 8/2/2018 12:00   11/22/2017 09:41   PSA Total 1.910     Results for ANITHA HAMLIN (MRN [id number]) as of 8/2/2018 12:00   6/5/2018 11:03   Color Urine Yellow   Appearance Urine Clear   Glucose Urine Negative   Bilirubin Urine Negative   Ketones Urine Trace (A)   Specific Gravity Urine >1.030   pH Urine 6.0   Protein Albumin Urine Negative   Urobilinogen Urine 1.0   Nitrite Urine Negative   Blood Urine Negative   Leukocyte Esterase Urine Negative   Source Midstream Urine     Post-Void Residual  A post-void residual was measured by ultrasonic bladder scanner.  11 mL today  151 mL (previously recorded)    Assessment  Mr. Hamlin is a 67 year old male who follows up with weak urinary stream.    He experienced improvement with Flomax however he notices only about a 12 hour benefit.  He would like to try twice daily    Plan  Continue Flomax 0.4mg twice daily  Follow up in 1 year

## 2018-08-30 NOTE — MR AVS SNAPSHOT
After Visit Summary   8/30/2018    Héctor Thornton    MRN: [id number]           Patient Information     Date Of Birth          1950        Visit Information        Provider Department      8/30/2018 9:30 AM Federico Marcial MD [facility]        Today's Diagnoses     Nocturia    -  1       Follow-ups after your visit        Who to contact     If you have questions or need follow up information about today's clinic visit or your schedule please contact [facility] directly at [phone].  Normal or non-critical lab and imaging results will be communicated to you by [employer], letter or phone within 4 business days after the clinic has received the results. If you do not hear from us within 7 days, please contact the clinic through [employer] or phone. If you have a critical or abnormal lab result, we will notify you by phone as soon as possible.  Submit refill requests through [employer] or call your pharmacy and they will forward the refill request to us. Please allow 3 business days for your refill to be completed.          Additional Information About Your Visit        MyChart Information     [employer] gives you secure access to your electronic health record. If you see a primary care provider, you can also send messages to your care team and make appointments. If you have questions, please call your primary care clinic.  If you do not have a primary care provider, please call [phone] and they will assist you.        Care EveryWhere ID     This is your Care EveryWhere ID. This could be used by other organizations to access your [facility] medical records  [id number]        Your Vitals Were     Respirations BMI (Body Mass Index)                16 38.35 kg/m2           Blood Pressure from Last 3 Encounters:   08/30/18 130/80   06/05/18 124/70   11/22/17 128/74    Weight from Last 3 Encounters:   08/30/18 120.4 kg (265 lb 6.4 oz)   08/02/18 118.2 kg (260 lb 9.6  oz)   06/05/18 119.3 kg (263 lb)              We Performed the Following     POST-VOID RESIDUAL BLADDER SCAN          Today's Medication Changes          These changes are accurate as of 8/30/18 10:09 AM.  If you have any questions, ask your nurse or doctor.               These medicines have changed or have updated prescriptions.        Dose/Directions    * tamsulosin 0.4 MG capsule   Commonly known as:  FLOMAX   This may have changed:  Another medication with the same name was added. Make sure you understand how and when to take each.   Used for:  Slowing of urinary stream   Changed by:  Federico Marcial MD        Dose:  0.4 mg   Take 1 capsule (0.4 mg) by mouth every evening   Quantity:  90 capsule   Refills:  3       * tamsulosin 0.4 MG capsule   Commonly known as:  FLOMAX   This may have changed:  You were already taking a medication with the same name, and this prescription was added. Make sure you understand how and when to take each.   Used for:  Nocturia   Changed by:  Federico Marcial MD        Dose:  0.8 mg   Take 2 capsules (0.8 mg) by mouth every evening   Quantity:  180 capsule   Refills:  3       * Notice:  This list has 2 medication(s) that are the same as other medications prescribed for you. Read the directions carefully, and ask your doctor or other care provider to review them with you.         Where to get your medicines      These medications were sent to [facility] [address] [facility], [place] - [address] AT [facility] [address]  [address], [facility] [address]     Phone:  [phone]     tamsulosin 0.4 MG capsule                Primary Care Provider Office Phone # Fax #    Edenilson Adasm [phone] [address]  [place] [address]        Equal Access to Services     [facility] KIM AH: Lynda Duran, abilio stephenson, ajay bates, eugenie moore. So [facility] [phone].    ATENCIÓN: Aj zapata  español, tiene a fournier disposición servicios gratuitos de asistencia lingüística. Florinda eaton [phone].    We comply with applicable federal civil rights laws and [place] laws. We do not discriminate on the basis of race, color, national origin, age, disability, sex, sexual orientation, or gender identity.            Thank you!     Thank you for choosing [facility] AND [facility]  for your care. Our goal is always to provide you with excellent care. Hearing back from our patients is one way we can continue to improve our services. Please take a few minutes to complete the written survey that you may receive in the mail after your visit with us. Thank you!             Your Updated Medication List - Protect others around you: Learn how to safely use, store and throw away your medicines at www.disposemymeds.org.          This list is accurate as of 8/30/18 10:09 AM.  Always use your most recent med list.                   Brand Name Dispense Instructions for use Diagnosis    albuterol 108 (90 Base) MCG/ACT inhaler    PROAIR HFA/PROVENTIL HFA/VENTOLIN HFA     Inhale 2 puffs into the lungs 4 times daily as needed for cough or shortness of breath / dyspnea        ALPRAZolam 0.25 MG tablet    XANAX     Take 0.125 mg by mouth daily and as needed for anxiety.        aspirin 81 MG EC tablet      Take 81 mg by mouth daily with food        clopidogrel 75 MG tablet    PLAVIX    90 tablet    Take 1 tablet (75 mg) by mouth daily    Coronary artery disease due to lipid rich plaque       furosemide 20 MG tablet    LASIX     Take 0.5-1 tablets by mouth every morning as needed        losartan 50 MG tablet    COZAAR    90 tablet    Take 1 tablet (50 mg) by mouth daily    Benign essential hypertension       Multi-vitamin Tabs tablet      Take 1 tablet by mouth daily        nitroGLYcerin 0.4 MG sublingual tablet    NITROSTAT     Place 0.4 mg under the tongue every 5 minutes as needed        * OMEGA-3 FISH OIL PO      Take 500 mg  by mouth        * fish oil-omega-3 fatty acids 1000 MG capsule      Take 500 mg by mouth        omeprazole 20 MG CR capsule    priLOSEC     Take 20 mg by mouth daily as needed for GI upset.        rosuvastatin 40 MG tablet    CRESTOR    90 tablet    Take 1 tablet (40 mg) by mouth daily    Mixed hyperlipidemia, Coronary artery disease due to lipid rich plaque       SACCHAROMYCES BOULARDII PO      Take by mouth daily        sertraline 50 MG tablet    ZOLOFT    180 tablet    TAKE 1 AND 1/2 TO 2 TABLETS BY MOUTH EVERY DAY    Dysthymia       sildenafil 100 MG tablet    VIAGRA     Take 100 mg by mouth daily as needed for erectile dysfunction . Take 30 minutes to 4 hours before sexual activity. Maximum of 100mg / 24 hours.        * sotalol 80 MG tablet    BETAPACE     Take 80 mg by mouth 2 times daily (before meals)        * sotalol 80 MG tablet    BETAPACE    180 tablet    Take 1 tablet (80 mg) by mouth 2 times daily    PVC's (premature ventricular contractions)       sucralfate 1 GM tablet    CARAFATE     Take 1 g by mouth 4 times daily (before meals and nightly) as needed for heartburn        * tamsulosin 0.4 MG capsule    FLOMAX    90 capsule    Take 1 capsule (0.4 mg) by mouth every evening    Slowing of urinary stream       * tamsulosin 0.4 MG capsule    FLOMAX    180 capsule    Take 2 capsules (0.8 mg) by mouth every evening    Nocturia       * Notice:  This list has 6 medication(s) that are the same as other medications prescribed for you. Read the directions carefully, and ask your doctor or other care provider to review them with you.

## 2018-09-11 ENCOUNTER — TELEPHONE (OUTPATIENT)
Dept: INTERNAL MEDICINE | Facility: OTHER | Age: 68
End: 2018-09-11

## 2018-10-09 ENCOUNTER — TELEPHONE (OUTPATIENT)
Dept: INTERNAL MEDICINE | Facility: OTHER | Age: 68
End: 2018-10-09

## 2018-10-09 NOTE — TELEPHONE ENCOUNTER
DJS - Patient called and wants to schedule a follow up for his stomach issues.  He states it is hard to swallow.  He is having lots of stomach pain.  He also has multiple dark spots on his face and shoulders that he finds concerning and wants checked out.  I could not find anything to schedule until 11/07.  Patient is requesting a sooner appointment.  Please call patient with any questions.      Shira Mc

## 2018-10-10 NOTE — TELEPHONE ENCOUNTER
Patient placed with Edenilson Adams MD on Friday at 2:00.      Gardenia Childs LPN 10/10/2018 4:17 PM

## 2018-10-12 ENCOUNTER — OFFICE VISIT (OUTPATIENT)
Dept: INTERNAL MEDICINE | Facility: OTHER | Age: 68
End: 2018-10-12
Attending: INTERNAL MEDICINE
Payer: COMMERCIAL

## 2018-10-12 VITALS
WEIGHT: 264 LBS | BODY MASS INDEX: 39.1 KG/M2 | HEART RATE: 60 BPM | RESPIRATION RATE: 18 BRPM | SYSTOLIC BLOOD PRESSURE: 102 MMHG | DIASTOLIC BLOOD PRESSURE: 68 MMHG | HEIGHT: 69 IN

## 2018-10-12 DIAGNOSIS — L82.0 INFLAMED SEBORRHEIC KERATOSIS: ICD-10-CM

## 2018-10-12 DIAGNOSIS — L98.9 SKIN LESION: ICD-10-CM

## 2018-10-12 DIAGNOSIS — R10.31 ABDOMINAL PAIN, RIGHT LOWER QUADRANT: Primary | ICD-10-CM

## 2018-10-12 DIAGNOSIS — R12 HEARTBURN: ICD-10-CM

## 2018-10-12 PROCEDURE — 17110 DESTRUCTION B9 LES UP TO 14: CPT | Performed by: INTERNAL MEDICINE

## 2018-10-12 PROCEDURE — 99213 OFFICE O/P EST LOW 20 MIN: CPT | Mod: 25 | Performed by: INTERNAL MEDICINE

## 2018-10-12 RX ORDER — LANSOPRAZOLE 30 MG/1
30 CAPSULE, DELAYED RELEASE ORAL DAILY
Qty: 90 CAPSULE | Refills: 3 | Status: SHIPPED | OUTPATIENT
Start: 2018-10-12 | End: 2019-04-18

## 2018-10-12 ASSESSMENT — ANXIETY QUESTIONNAIRES
7. FEELING AFRAID AS IF SOMETHING AWFUL MIGHT HAPPEN: NOT AT ALL
GAD7 TOTAL SCORE: 3
1. FEELING NERVOUS, ANXIOUS, OR ON EDGE: SEVERAL DAYS
3. WORRYING TOO MUCH ABOUT DIFFERENT THINGS: SEVERAL DAYS
5. BEING SO RESTLESS THAT IT IS HARD TO SIT STILL: NOT AT ALL
6. BECOMING EASILY ANNOYED OR IRRITABLE: SEVERAL DAYS
IF YOU CHECKED OFF ANY PROBLEMS ON THIS QUESTIONNAIRE, HOW DIFFICULT HAVE THESE PROBLEMS MADE IT FOR YOU TO DO YOUR WORK, TAKE CARE OF THINGS AT HOME, OR GET ALONG WITH OTHER PEOPLE: NOT DIFFICULT AT ALL
2. NOT BEING ABLE TO STOP OR CONTROL WORRYING: NOT AT ALL

## 2018-10-12 ASSESSMENT — ENCOUNTER SYMPTOMS
CHILLS: 0
ARTHRALGIAS: 0
MYALGIAS: 0
WHEEZING: 0
HEMATURIA: 0
DIZZINESS: 0
CONFUSION: 0
FATIGUE: 0
NAUSEA: 0
ABDOMINAL PAIN: 1
SHORTNESS OF BREATH: 0
DYSURIA: 0
PALPITATIONS: 0
BRUISES/BLEEDS EASILY: 0
COUGH: 0
EYE PAIN: 0
DIARRHEA: 0
LIGHT-HEADEDNESS: 0
FEVER: 0
VOMITING: 0
AGITATION: 0

## 2018-10-12 ASSESSMENT — PATIENT HEALTH QUESTIONNAIRE - PHQ9: 5. POOR APPETITE OR OVEREATING: NOT AT ALL

## 2018-10-12 ASSESSMENT — PAIN SCALES - GENERAL: PAINLEVEL: MILD PAIN (3)

## 2018-10-12 NOTE — NURSING NOTE
"Chief Complaint   Patient presents with     RECHECK     stomach issues       Initial /68 (BP Location: Right arm, Patient Position: Sitting, Cuff Size: Adult Large)  Pulse 60  Resp 18  Ht 5' 9.25\" (1.759 m)  Wt 264 lb (119.7 kg)  BMI 38.71 kg/m2 Estimated body mass index is 38.71 kg/(m^2) as calculated from the following:    Height as of this encounter: 5' 9.25\" (1.759 m).    Weight as of this encounter: 264 lb (119.7 kg).  Medication Reconciliation: complete    Gardenia Hampton LPN   "

## 2018-10-12 NOTE — LETTER
October 12, 2018      Héctor Thornton  [address] [place] [id number]        To Whom It May Concern:    Héctor Thornton was seen in our clinic 10/12/2018. He may return to work as scheduled.     Sincerely,        Edenilson Adams MD

## 2018-10-12 NOTE — PATIENT INSTRUCTIONS
Change omeprazole ---- over to Prevacid.     Surgical consult for right hand lesion removal  - they will call with date/time of appointment.    -- shouldn't need to stop your plavix for this.     What to Expect Following Cryosurgery (Liquid Nitrogen)   What is Cryosurgery?   Cryosurgery is a technique for removing skin lesions that primarily involve the surface of the skin, such as warts, seborrheic keratosis, or actinic keratosis. It is a quick method of removing the lesion with minimal scarring.   The liquid nitrogen needs to be applied long enough tofreeze the affected skin. By freezing the skin, a blister is created underneath the lesion. Ideally, as the new skin forms underneath the blister, the abnormal skin on the roof of the blister peels off. Occasionally, if thelesion is very thick (such as a large wart), only the surface is blistered off. The base or residual lesion may need to be frozen at another visit.   It takes about one to two weeks for the scab to fall off, which is whenthe new layer of skin has formed under the blister. Areas of thinner skin, such as the face, may heal a little faster.      What to Expect Over the Next Few Weeks     During Treatment - Area beingtreated will sting, burn, and then possibly itch.     Immediately After Treatment - Area will be red, sore, and swollen.     Next Day - Blister or blood blister has formed, tenderness starts to subside. Apply aBand-Aid if   necessary.     7 Days - Surface is dark red/brown and scab-like. Apply Vaseline  or an antibacterial ointment, such as Polysporin , if necessary.     2 to 4 Weeks - The surface startsto peel off. This may be encouraged gently during bathing, when the scab is softened.     No makeup shouldbe applied until area is fully healed.      Howto Take Care of the Skin after Cryosurgery     ABand-Aid can be used for larger blisters or blisters in areas that are more likely to be traumatized -such as fingers and toes. If the area  becomes dry or crusted, an ointment (Vaseline , Bacitracin , or Polysporin ) can also be applied.     Cleanse area with a mild cleanser and cool water.     Pat the area dry with a lint-free cloth and apply an ointment (Vaseline , Bacitracin , or Polysporin ).     Avoid glycolic acids, Vitamin C, scrubs, Tretinoin (Retin-A), and Retinol creams for 7 to 10 days.     If approved by your Provider, you may bathe, swim, exercise, and otherwise follow all of your normalactivities.     The area may get wet while bathing, but swimming or hot tub use should be avoided for oneweek following a treatment or while the skin is open.     Within 24 hours, you can expect the area to beswollen and/or blistered. The blister may not be visible to the naked eye.     Within one week, theswelling goes down. The top becomes dark red and scab-like. The scab will loosen over the next weeks, and should fall off within one month.      Adverse Effects     The most common adverse effects are pain, swelling/blistering, potential for infection, and pale discoloration of the skin after it heals.      Blisters        Anytime a blister surfaces, whether from ill-fitting shoes, an oven burn, or liquid nitrogen cryosurgery, it will be a bit painful. For most patients, the pain is a temporary stingwith some discomfort periodically over the next day as the blister forms.     The goal is to achieve ablister. This means, most commonly, patients will have a blister form following treatment. Sometimes, the blister is so thin that it can't be seen and may have minimal swelling. Occasionally, a blood blister forms that canbe quite dramatic but is harmless.     Rarely, the blister may become infected. When this happens, theblister becomes unusually tender, the fluid becomes cloudy, and the redness around it becomes more extensive (and may even form streaks). If this happens, contact our office.     Some lesions, especially those on theface, may leave a slight  pale discoloration.     True scarring,involving deeper layers of the skin is unlikely.       Return as needed for follow-up with Dr. Adams.    Clinic : [phone]  Appointment line: [phone]

## 2018-10-12 NOTE — MR AVS SNAPSHOT
After Visit Summary   10/12/2018    Héctor Thornton    MRN: [id number]           Patient Information     Date Of Birth          1950        Visit Information        Provider Department      10/12/2018 2:00 PM Edenilson Adams MD [facility]        Today's Diagnoses     Abdominal pain, right lower quadrant    -  1    Skin lesion - right dorsal brown lesion        Inflamed seborrheic keratosis        Heartburn          Care Instructions    Change omeprazole ---- over to Prevacid.     Surgical consult for right hand lesion removal  - they will call with date/time of appointment.    -- shouldn't need to stop your plavix for this.     What to Expect Following Cryosurgery (Liquid Nitrogen)   What is Cryosurgery?   Cryosurgery is a technique for removing skin lesions that primarily involve the surface of the skin, such as warts, seborrheic keratosis, or actinic keratosis. It is a quick method of removing the lesion with minimal scarring.   The liquid nitrogen needs to be applied long enough tofreeze the affected skin. By freezing the skin, a blister is created underneath the lesion. Ideally, as the new skin forms underneath the blister, the abnormal skin on the roof of the blister peels off. Occasionally, if thelesion is very thick (such as a large wart), only the surface is blistered off. The base or residual lesion may need to be frozen at another visit.   It takes about one to two weeks for the scab to fall off, which is whenthe new layer of skin has formed under the blister. Areas of thinner skin, such as the face, may heal a little faster.      What to Expect Over the Next Few Weeks     During Treatment - Area beingtreated will sting, burn, and then possibly itch.     Immediately After Treatment - Area will be red, sore, and swollen.     Next Day - Blister or blood blister has formed, tenderness starts to subside. Apply aBand-Aid if   necessary.     7 Days - Surface is dark  red/brown and scab-like. Apply Vaseline  or an antibacterial ointment, such as Polysporin , if necessary.     2 to 4 Weeks - The surface startsto peel off. This may be encouraged gently during bathing, when the scab is softened.     No makeup shouldbe applied until area is fully healed.      Howto Take Care of the Skin after Cryosurgery     ABand-Aid can be used for larger blisters or blisters in areas that are more likely to be traumatized -such as fingers and toes. If the area becomes dry or crusted, an ointment (Vaseline , Bacitracin , or Polysporin ) can also be applied.     Cleanse area with a mild cleanser and cool water.     Pat the area dry with a lint-free cloth and apply an ointment (Vaseline , Bacitracin , or Polysporin ).     Avoid glycolic acids, Vitamin C, scrubs, Tretinoin (Retin-A), and Retinol creams for 7 to 10 days.     If approved by your Provider, you may bathe, swim, exercise, and otherwise follow all of your normalactivities.     The area may get wet while bathing, but swimming or hot tub use should be avoided for oneweek following a treatment or while the skin is open.     Within 24 hours, you can expect the area to beswollen and/or blistered. The blister may not be visible to the naked eye.     Within one week, theswelling goes down. The top becomes dark red and scab-like. The scab will loosen over the next weeks, and should fall off within one month.      Adverse Effects     The most common adverse effects are pain, swelling/blistering, potential for infection, and pale discoloration of the skin after it heals.      Blisters        Anytime a blister surfaces, whether from ill-fitting shoes, an oven burn, or liquid nitrogen cryosurgery, it will be a bit painful. For most patients, the pain is a temporary stingwith some discomfort periodically over the next day as the blister forms.     The goal is to achieve ablister. This means, most commonly, patients will have a blister form following  treatment. Sometimes, the blister is so thin that it can't be seen and may have minimal swelling. Occasionally, a blood blister forms that canbe quite dramatic but is harmless.     Rarely, the blister may become infected. When this happens, theblister becomes unusually tender, the fluid becomes cloudy, and the redness around it becomes more extensive (and may even form streaks). If this happens, contact our office.     Some lesions, especially those on theface, may leave a slight pale discoloration.     True scarring,involving deeper layers of the skin is unlikely.       Return as needed for follow-up with Dr. Adams.    Clinic : [phone]  Appointment line: [phone]            Follow-ups after your visit        Additional Services     GENERAL SURG ADULT REFERRAL       Your provider has referred you to: [facility] -- procedure only appointment -- small lesion on dorsal right hand,   (doesn't need to stop plavix)    Please be aware that coverage of these services is subject to the terms and limitations of your health insurance plan.  Call member services at your health plan with any benefit or coverage questions.      Please bring the following with you to your appointment:    (1) Any X-Rays, CTs or MRIs which have been performed.  Contact the facility where they were done to arrange for  prior to your scheduled appointment.   (2) List of current medications   (3) This referral request   (4) Any documents/labs given to you for this referral                  Follow-up notes from your care team     Return if symptoms worsen or fail to improve.      Who to contact     If you have questions or need follow up information about today's clinic visit or your schedule please contact [facility] directly at [phone].  Normal or non-critical lab and imaging results will be communicated to you by MyChart, letter or phone within 4 business days after the clinic  "has received the results. If you do not hear from us within 7 days, please contact the clinic through [employer] or phone. If you have a critical or abnormal lab result, we will notify you by phone as soon as possible.  Submit refill requests through [employer] or call your pharmacy and they will forward the refill request to us. Please allow 3 business days for your refill to be completed.          Additional Information About Your Visit        Fit with [employer] Information     [employer] gives you secure access to your electronic health record. If you see a primary care provider, you can also send messages to your care team and make appointments. If you have questions, please call your primary care clinic.  If you do not have a primary care provider, please call [phone] and they will assist you.        Care EveryWhere ID     This is your Care EveryWhere ID. This could be used by other organizations to access your [place] medical records  [id number]        Your Vitals Were     Pulse Respirations Height BMI (Body Mass Index)          60 18 5' 9.25\" (1.759 m) 38.71 kg/m2         Blood Pressure from Last 3 Encounters:   10/12/18 102/68   08/30/18 130/80   06/05/18 124/70    Weight from Last 3 Encounters:   10/12/18 264 lb (119.7 kg)   08/30/18 265 lb 6.4 oz (120.4 kg)   08/02/18 260 lb 9.6 oz (118.2 kg)              We Performed the Following     DESTRUCT BENIGN LESION, UP TO 14     GENERAL SURG ADULT REFERRAL          Today's Medication Changes          These changes are accurate as of 10/12/18  2:14 PM.  If you have any questions, ask your nurse or doctor.               Start taking these medicines.        Dose/Directions    LANsoprazole 30 MG CR capsule   Commonly known as:  PREVACID   Used for:  Heartburn   Started by:  Edenilson Adams MD        Dose:  30 mg   Take 1 capsule (30 mg) by mouth daily Take 30-60 minutes before a meal.   Quantity:  90 capsule   Refills:  3         These medicines have changed or have updated " prescriptions.        Dose/Directions    tamsulosin 0.4 MG capsule   Commonly known as:  FLOMAX   This may have changed:  Another medication with the same name was removed. Continue taking this medication, and follow the directions you see here.   Used for:  Nocturia   Changed by:  Edenilson Adams MD        Dose:  0.8 mg   Take 2 capsules (0.8 mg) by mouth every evening   Quantity:  180 capsule   Refills:  3         Stop taking these medicines if you haven't already. Please contact your care team if you have questions.     omeprazole 20 MG CR capsule   Commonly known as:  priLOSEC   Stopped by:  Edenilson Adams MD                Where to get your medicines      These medications were sent to [id number] - [place], [place] - [address] AT [facility] OF [facility] [address], [place] [address]     Phone:  [phone]     LANsoprazole 30 MG CR capsule                Primary Care Provider Office Phone # Fax #    Edenilson Adams [phone] [address] [facility] [address]        Equal Access to Services     : Hadii aad ku hadasho Soomaali, waaxda luqadaha, qaybta kaalmada adeegyamax, eugenie crews . So [facility] [phone].    ATENCIÓN: Si habla español, tiene a fournier disposición servicios gratuitos de asistencia lingüística. [facility] [phone].    We comply with applicable federal civil rights laws and [place] laws. We do not discriminate on the basis of race, color, national origin, age, disability, sex, sexual orientation, or gender identity.            Thank you!     Thank you for choosing [facility] AND [facility]  for your care. Our goal is always to provide you with excellent care. Hearing back from our patients is one way we can continue to improve our services. Please take a few minutes to complete the written survey that you may receive in the mail after your visit with us. Thank you!             Your Updated  Medication List - Protect others around you: Learn how to safely use, store and throw away your medicines at www.disposemymeds.org.          This list is accurate as of 10/12/18  2:14 PM.  Always use your most recent med list.                   Brand Name Dispense Instructions for use Diagnosis    albuterol 108 (90 Base) MCG/ACT inhaler    PROAIR HFA/PROVENTIL HFA/VENTOLIN HFA     Inhale 2 puffs into the lungs 4 times daily as needed for cough or shortness of breath / dyspnea        ALPRAZolam 0.25 MG tablet    XANAX     Take 0.125 mg by mouth daily and as needed for anxiety.        aspirin 81 MG EC tablet      Take 81 mg by mouth daily with food        clopidogrel 75 MG tablet    PLAVIX    90 tablet    Take 1 tablet (75 mg) by mouth daily    Coronary artery disease due to lipid rich plaque       furosemide 20 MG tablet    LASIX     Take 0.5-1 tablets by mouth every morning as needed        LANsoprazole 30 MG CR capsule    PREVACID    90 capsule    Take 1 capsule (30 mg) by mouth daily Take 30-60 minutes before a meal.    Heartburn       losartan 50 MG tablet    COZAAR    90 tablet    Take 1 tablet (50 mg) by mouth daily    Benign essential hypertension       Multi-vitamin Tabs tablet      Take 1 tablet by mouth daily        nitroGLYcerin 0.4 MG sublingual tablet    NITROSTAT     Place 0.4 mg under the tongue every 5 minutes as needed        * OMEGA-3 FISH OIL PO      Take 500 mg by mouth        * fish oil-omega-3 fatty acids 1000 MG capsule      Take 500 mg by mouth        rosuvastatin 40 MG tablet    CRESTOR    90 tablet    Take 1 tablet (40 mg) by mouth daily    Mixed hyperlipidemia, Coronary artery disease due to lipid rich plaque       SACCHAROMYCES BOULARDII PO      Take by mouth daily        sertraline 50 MG tablet    ZOLOFT    180 tablet    TAKE 1 AND 1/2 TO 2 TABLETS BY MOUTH EVERY DAY    Dysthymia       sildenafil 100 MG tablet    VIAGRA     Take 100 mg by mouth daily as needed for erectile dysfunction .  Take 30 minutes to 4 hours before sexual activity. Maximum of 100mg / 24 hours.        * sotalol 80 MG tablet    BETAPACE     Take 80 mg by mouth 2 times daily (before meals)        * sotalol 80 MG tablet    BETAPACE    180 tablet    Take 1 tablet (80 mg) by mouth 2 times daily    PVC's (premature ventricular contractions)       sucralfate 1 GM tablet    CARAFATE     Take 1 g by mouth 4 times daily (before meals and nightly) as needed for heartburn        tamsulosin 0.4 MG capsule    FLOMAX    180 capsule    Take 2 capsules (0.8 mg) by mouth every evening    Nocturia       * Notice:  This list has 4 medication(s) that are the same as other medications prescribed for you. Read the directions carefully, and ask your doctor or other care provider to review them with you.

## 2018-10-13 ASSESSMENT — PATIENT HEALTH QUESTIONNAIRE - PHQ9: SUM OF ALL RESPONSES TO PHQ QUESTIONS 1-9: 7

## 2018-10-13 ASSESSMENT — ANXIETY QUESTIONNAIRES: GAD7 TOTAL SCORE: 3

## 2018-11-02 ENCOUNTER — TRANSFERRED RECORDS (OUTPATIENT)
Dept: HEALTH INFORMATION MANAGEMENT | Facility: OTHER | Age: 68
End: 2018-11-02

## 2018-11-29 RX ORDER — SOTALOL HYDROCHLORIDE 80 MG/1
TABLET ORAL
Qty: 180 TABLET | Refills: 0 | OUTPATIENT
Start: 2018-11-29

## 2018-11-29 NOTE — TELEPHONE ENCOUNTER
Sotalol refilled on 2/26/18 #180 x 3 refills to [facility].  Called patient and patient states he does not go through [facility] any more and state that he does not need a refill sent to [facility] as they used the [facility].    Alexandra Jiménez RN on 11/29/2018 at 10:59 AM

## 2018-11-30 ENCOUNTER — MYC MEDICAL ADVICE (OUTPATIENT)
Dept: INTERNAL MEDICINE | Facility: OTHER | Age: 68
End: 2018-11-30

## 2018-11-30 NOTE — TELEPHONE ENCOUNTER
Noted.  Both of these plans should be just fine.  Biggest thing about the protein diet is to make sure you are drinking enough liquids.  Edenilson Adams MD

## 2019-01-06 DIAGNOSIS — F34.1 DYSTHYMIA: ICD-10-CM

## 2019-02-27 ENCOUNTER — MYC MEDICAL ADVICE (OUTPATIENT)
Dept: INTERNAL MEDICINE | Facility: OTHER | Age: 69
End: 2019-02-27

## 2019-02-27 DIAGNOSIS — R73.9 ELEVATED RANDOM BLOOD GLUCOSE LEVEL: ICD-10-CM

## 2019-02-27 DIAGNOSIS — E78.2 MIXED HYPERLIPIDEMIA: Primary | ICD-10-CM

## 2019-02-27 DIAGNOSIS — I10 BENIGN ESSENTIAL HYPERTENSION: ICD-10-CM

## 2019-02-27 NOTE — TELEPHONE ENCOUNTER
Labs ordered. Okay to make lab only appt.     If he wants, we can see him a day or 2 later and review the results.    Edenilson Adams MD

## 2019-03-14 DIAGNOSIS — I10 BENIGN ESSENTIAL HYPERTENSION: ICD-10-CM

## 2019-03-14 DIAGNOSIS — E78.2 MIXED HYPERLIPIDEMIA: ICD-10-CM

## 2019-03-14 DIAGNOSIS — R73.9 ELEVATED RANDOM BLOOD GLUCOSE LEVEL: ICD-10-CM

## 2019-03-14 LAB
ALBUMIN SERPL-MCNC: 4 G/DL (ref 3.5–5.7)
ALBUMIN UR-MCNC: NEGATIVE MG/DL
ALP SERPL-CCNC: 60 U/L (ref 34–104)
ALT SERPL W P-5'-P-CCNC: 12 U/L (ref 7–52)
ANION GAP SERPL CALCULATED.3IONS-SCNC: 3 MMOL/L (ref 3–14)
APPEARANCE UR: CLEAR
AST SERPL W P-5'-P-CCNC: 17 U/L (ref 13–39)
BILIRUB SERPL-MCNC: 0.5 MG/DL (ref 0.3–1)
BILIRUB UR QL STRIP: ABNORMAL
BUN SERPL-MCNC: 13 MG/DL (ref 7–25)
CALCIUM SERPL-MCNC: 9.3 MG/DL (ref 8.6–10.3)
CHLORIDE SERPL-SCNC: 104 MMOL/L (ref 98–107)
CHOLEST SERPL-MCNC: 152 MG/DL
CO2 SERPL-SCNC: 30 MMOL/L (ref 21–31)
COLOR UR AUTO: YELLOW
CREAT SERPL-MCNC: 0.93 MG/DL (ref 0.7–1.3)
ERYTHROCYTE [DISTWIDTH] IN BLOOD BY AUTOMATED COUNT: 12.8 % (ref 10–15)
GFR SERPL CREATININE-BSD FRML MDRD: 81 ML/MIN/{1.73_M2}
GLUCOSE SERPL-MCNC: 93 MG/DL (ref 70–105)
GLUCOSE UR STRIP-MCNC: NEGATIVE MG/DL
HBA1C MFR BLD: 5.3 % (ref 4–6)
HCT VFR BLD AUTO: 45.5 % (ref 40–53)
HDLC SERPL-MCNC: 40 MG/DL (ref 23–92)
HGB BLD-MCNC: 16.4 G/DL (ref 13.3–17.7)
HGB UR QL STRIP: ABNORMAL
KETONES UR STRIP-MCNC: ABNORMAL MG/DL
LDLC SERPL CALC-MCNC: 89 MG/DL
LEUKOCYTE ESTERASE UR QL STRIP: NEGATIVE
MCH RBC QN AUTO: 31.6 PG (ref 26.5–33)
MCHC RBC AUTO-ENTMCNC: 36 G/DL (ref 31.5–36.5)
MCV RBC AUTO: 88 FL (ref 78–100)
NITRATE UR QL: NEGATIVE
NONHDLC SERPL-MCNC: 112 MG/DL
PH UR STRIP: 5.5 PH (ref 5–9)
PLATELET # BLD AUTO: 275 10E9/L (ref 150–450)
POTASSIUM SERPL-SCNC: 4.4 MMOL/L (ref 3.5–5.1)
PROT SERPL-MCNC: 5.9 G/DL (ref 6.4–8.9)
RBC # BLD AUTO: 5.19 10E12/L (ref 4.4–5.9)
RBC #/AREA URNS AUTO: NORMAL /HPF
SODIUM SERPL-SCNC: 137 MMOL/L (ref 134–144)
SOURCE: ABNORMAL
SP GR UR STRIP: 1.02 (ref 1–1.03)
TRIGL SERPL-MCNC: 117 MG/DL
UROBILINOGEN UR STRIP-ACNC: 0.2 EU/DL (ref 0.2–1)
WBC # BLD AUTO: 5.9 10E9/L (ref 4–11)
WBC #/AREA URNS AUTO: NORMAL /HPF

## 2019-03-14 PROCEDURE — 81001 URINALYSIS AUTO W/SCOPE: CPT | Performed by: INTERNAL MEDICINE

## 2019-03-14 PROCEDURE — 80061 LIPID PANEL: CPT | Performed by: INTERNAL MEDICINE

## 2019-03-14 PROCEDURE — 80053 COMPREHEN METABOLIC PANEL: CPT | Performed by: INTERNAL MEDICINE

## 2019-03-14 PROCEDURE — 85027 COMPLETE CBC AUTOMATED: CPT | Performed by: INTERNAL MEDICINE

## 2019-03-14 PROCEDURE — 83036 HEMOGLOBIN GLYCOSYLATED A1C: CPT | Performed by: INTERNAL MEDICINE

## 2019-03-14 PROCEDURE — 36415 COLL VENOUS BLD VENIPUNCTURE: CPT | Performed by: INTERNAL MEDICINE

## 2019-03-27 DIAGNOSIS — J98.01 BRONCHOSPASM: Primary | ICD-10-CM

## 2019-03-27 RX ORDER — ALBUTEROL SULFATE 90 UG/1
AEROSOL, METERED RESPIRATORY (INHALATION)
Qty: 18 G | Refills: 11 | Status: SHIPPED | OUTPATIENT
Start: 2019-03-27 | End: 2020-10-23

## 2019-03-27 NOTE — TELEPHONE ENCOUNTER
Routing refill request to provider for review/approval because:  Medication is reported/historical    LOV: 10/12/18  Per care everywhere  albuterol HFA (PRO-AIR,VENTOLIN,PROVENTIL) 90 mcg/actuation inhaler    Indications: Bronchospasm Inhale 2 Puffs by mouth 4 times daily if needed (for cough and SOB). 1 Inhaler   3 11/22/2017       Alexandra Jiménez RN on 3/27/2019 at 4:12 PM

## 2019-04-07 DIAGNOSIS — I25.9 ISCHEMIC HEART DISEASE, CHRONIC: ICD-10-CM

## 2019-04-07 DIAGNOSIS — F41.9 ANXIETY: Primary | ICD-10-CM

## 2019-04-10 PROBLEM — F41.1 GENERALIZED ANXIETY DISORDER: Status: ACTIVE | Noted: 2019-04-10

## 2019-04-10 RX ORDER — NITROGLYCERIN 0.4 MG/1
TABLET SUBLINGUAL
Qty: 25 TABLET | Refills: 3 | OUTPATIENT
Start: 2019-04-10

## 2019-04-10 RX ORDER — ALPRAZOLAM 0.25 MG
TABLET ORAL
Qty: 15 TABLET | Refills: 3 | OUTPATIENT
Start: 2019-04-10

## 2019-04-10 NOTE — TELEPHONE ENCOUNTER
Routing refill request to provider for review/approval because:  Drug not on the [facility] refill protocol     LOV: 10/12/18    Alexandra Jiménez RN on 4/10/2019 at 9:58 AM

## 2019-04-10 NOTE — TELEPHONE ENCOUNTER
Patient placed with Edenilson Adams MD tomorrow at 0920.      Gardenia Childs LPN 4/10/2019 2:27 PM

## 2019-04-10 NOTE — TELEPHONE ENCOUNTER
Xanax is controlled med and needs office visit.   Please find out how soon he will be out so we can get him an appointment.     Edenilson Adams MD

## 2019-04-18 ENCOUNTER — OFFICE VISIT (OUTPATIENT)
Dept: INTERNAL MEDICINE | Facility: OTHER | Age: 69
End: 2019-04-18
Attending: INTERNAL MEDICINE
Payer: COMMERCIAL

## 2019-04-18 VITALS
HEART RATE: 56 BPM | SYSTOLIC BLOOD PRESSURE: 104 MMHG | HEIGHT: 70 IN | WEIGHT: 224.38 LBS | DIASTOLIC BLOOD PRESSURE: 66 MMHG | BODY MASS INDEX: 32.12 KG/M2 | TEMPERATURE: 96.9 F | RESPIRATION RATE: 16 BRPM

## 2019-04-18 DIAGNOSIS — F41.0 PANIC ATTACKS: Primary | ICD-10-CM

## 2019-04-18 DIAGNOSIS — I49.3 PVC'S (PREMATURE VENTRICULAR CONTRACTIONS): ICD-10-CM

## 2019-04-18 DIAGNOSIS — N52.9 ERECTILE DYSFUNCTION, UNSPECIFIED ERECTILE DYSFUNCTION TYPE: ICD-10-CM

## 2019-04-18 DIAGNOSIS — F41.1 GENERALIZED ANXIETY DISORDER: ICD-10-CM

## 2019-04-18 DIAGNOSIS — R12 HEARTBURN: ICD-10-CM

## 2019-04-18 DIAGNOSIS — I25.10 CORONARY ARTERY DISEASE DUE TO LIPID RICH PLAQUE: ICD-10-CM

## 2019-04-18 DIAGNOSIS — I25.10 CORONARY ARTERY DISEASE DUE TO LIPID RICH PLAQUE: Primary | ICD-10-CM

## 2019-04-18 DIAGNOSIS — E78.2 MIXED HYPERLIPIDEMIA: ICD-10-CM

## 2019-04-18 DIAGNOSIS — I25.83 CORONARY ARTERY DISEASE DUE TO LIPID RICH PLAQUE: ICD-10-CM

## 2019-04-18 DIAGNOSIS — I10 BENIGN ESSENTIAL HYPERTENSION: ICD-10-CM

## 2019-04-18 DIAGNOSIS — I25.83 CORONARY ARTERY DISEASE DUE TO LIPID RICH PLAQUE: Primary | ICD-10-CM

## 2019-04-18 DIAGNOSIS — I50.32 CHRONIC DIASTOLIC HEART FAILURE (H): ICD-10-CM

## 2019-04-18 PROCEDURE — 99214 OFFICE O/P EST MOD 30 MIN: CPT | Performed by: INTERNAL MEDICINE

## 2019-04-18 RX ORDER — ALPRAZOLAM 0.25 MG
.125-.25 TABLET ORAL DAILY PRN
Qty: 45 TABLET | Refills: 1 | Status: SHIPPED | OUTPATIENT
Start: 2019-04-18 | End: 2019-11-26

## 2019-04-18 RX ORDER — SILDENAFIL 100 MG/1
100 TABLET, FILM COATED ORAL DAILY PRN
Qty: 6 TABLET | Refills: 11 | Status: SHIPPED | OUTPATIENT
Start: 2019-04-18 | End: 2021-10-28

## 2019-04-18 RX ORDER — LANSOPRAZOLE 30 MG/1
30 CAPSULE, DELAYED RELEASE ORAL DAILY PRN
Qty: 90 CAPSULE | Refills: 3 | COMMUNITY
Start: 2019-04-18 | End: 2020-10-23

## 2019-04-18 RX ORDER — SOTALOL HYDROCHLORIDE 80 MG/1
80 TABLET ORAL 2 TIMES DAILY
Qty: 180 TABLET | Refills: 3 | Status: CANCELLED | OUTPATIENT
Start: 2019-04-18

## 2019-04-18 ASSESSMENT — ENCOUNTER SYMPTOMS
BRUISES/BLEEDS EASILY: 0
NAUSEA: 0
WOUND: 0
FEVER: 0
VOMITING: 0
AGITATION: 0
MYALGIAS: 0
DIZZINESS: 0
SLEEP DISTURBANCE: 1
ABDOMINAL PAIN: 0
CONFUSION: 0
DYSURIA: 0
COUGH: 0
WHEEZING: 0
HEMATURIA: 0
CHILLS: 0
PALPITATIONS: 0
ARTHRALGIAS: 0
LIGHT-HEADEDNESS: 0
DIARRHEA: 0
SHORTNESS OF BREATH: 0
NERVOUS/ANXIOUS: 1

## 2019-04-18 ASSESSMENT — PATIENT HEALTH QUESTIONNAIRE - PHQ9
SUM OF ALL RESPONSES TO PHQ QUESTIONS 1-9: 0
5. POOR APPETITE OR OVEREATING: NOT AT ALL

## 2019-04-18 ASSESSMENT — ANXIETY QUESTIONNAIRES
IF YOU CHECKED OFF ANY PROBLEMS ON THIS QUESTIONNAIRE, HOW DIFFICULT HAVE THESE PROBLEMS MADE IT FOR YOU TO DO YOUR WORK, TAKE CARE OF THINGS AT HOME, OR GET ALONG WITH OTHER PEOPLE: NOT DIFFICULT AT ALL
7. FEELING AFRAID AS IF SOMETHING AWFUL MIGHT HAPPEN: NOT AT ALL
GAD7 TOTAL SCORE: 0
6. BECOMING EASILY ANNOYED OR IRRITABLE: NOT AT ALL
2. NOT BEING ABLE TO STOP OR CONTROL WORRYING: NOT AT ALL
3. WORRYING TOO MUCH ABOUT DIFFERENT THINGS: NOT AT ALL
5. BEING SO RESTLESS THAT IT IS HARD TO SIT STILL: NOT AT ALL
1. FEELING NERVOUS, ANXIOUS, OR ON EDGE: NOT AT ALL

## 2019-04-18 ASSESSMENT — PAIN SCALES - GENERAL: PAINLEVEL: MODERATE PAIN (5)

## 2019-04-18 ASSESSMENT — MIFFLIN-ST. JEOR: SCORE: 1796.07

## 2019-04-18 NOTE — PROGRESS NOTES
"Nursing Notes:   Gardenia Childs LPN  4/18/2019 11:33 AM  Signed  Patient presents to the clinic for medication management, cardiology manages several medications.      Chief Complaint   Patient presents with     Recheck Medication       Initial /66 (BP Location: Right arm, Patient Position: Sitting, Cuff Size: Adult Regular)   Pulse 56   Temp 96.9  F (36.1  C) (Tympanic)   Resp 16   Ht 1.765 m (5' 9.5\")   Wt 101.8 kg (224 lb 6 oz)   BMI 32.66 kg/m    Estimated body mass index is 32.66 kg/m  as calculated from the following:    Height as of this encounter: 1.765 m (5' 9.5\").    Weight as of this encounter: 101.8 kg (224 lb 6 oz).  Medication Reconciliation: complete    Gardenia Childs LPN    Nursing note reviewed with patient.  Accuracy and completeness verified.   Mr. Thornton is a 68 year old male who:  Patient presents with:  Recheck Medication      ICD-10-CM    1. Panic attacks F41.0 ALPRAZolam (XANAX) 0.25 MG tablet   2. Generalized anxiety disorder F41.1 ALPRAZolam (XANAX) 0.25 MG tablet   3. Chronic diastolic heart failure (H) I50.32    4. Coronary artery disease due to lipid rich plaque I25.10     I25.83    5. Benign essential hypertension I10    6. Mixed hyperlipidemia E78.2    7. Heartburn R12 LANsoprazole (PREVACID) 30 MG DR capsule   8. PVC's (premature ventricular contractions) I49.3    9. Erectile dysfunction, unspecified erectile dysfunction type N52.9 sildenafil (VIAGRA) 100 MG tablet     HPI  Patient presents for follow-up of anxiety and panic attacks.  States that he hardly uses Xanax anymore.  He is usually able to fight through the anxiety but every once in a while he gets a pretty bad panic attack and needs to take a small dose of Xanax.  Usually using half of a tablet but occasionally if it gets bad he takes a whole one.  Needs refills.  One refill typically lasting anywhere from 3 to 6 months.    Chronic diastolic heart failure, appears controlled.  Doing well with current " medication regimen.  Follows closely with cardiology.  They have been prescribing all of his medications including Lasix.  This has been quite helpful for his leg swelling issues.    Coronary artery disease, continues on Plavix, Crestor.  No exertional chest pain reported per    Hypertension, currently well controlled.  Tolerating medications.  Denies lightheadedness or dizziness.  --- Was taking 2 tablets of Flomax daily but cut back down to 1 capsule daily because he was having some erectile dysfunction issues.    Mixed hyperlipidemia, doing well with Crestor 40 mg daily.  Continue current dosing.    Heartburn, intermittent.  Improved with Prevacid.  Continue current dosing.    PVCs, sotalol has helped resolve these.  Will follow with cardiology.  No recent change in symptoms.    Erectile dysfunction, has been using Viagra.  No problems.  Has not taken nitroglycerin sublingual at home.  Has not had any issues with Viagra in the past.  Would like refills.    Functional Capacity: > 4 METS.   Review of Systems   Constitutional: Negative for chills and fever.   HENT: Negative for congestion and hearing loss.    Eyes: Negative for visual disturbance.   Respiratory: Negative for cough, shortness of breath and wheezing.    Cardiovascular: Negative for chest pain and palpitations.   Gastrointestinal: Negative for abdominal pain, diarrhea, nausea and vomiting.   Endocrine: Negative for cold intolerance and heat intolerance.   Genitourinary: Negative for dysuria and hematuria.   Musculoskeletal: Negative for arthralgias and myalgias.        + restless legs   Skin: Negative for rash and wound.   Allergic/Immunologic: Negative for immunocompromised state.   Neurological: Negative for dizziness and light-headedness.   Hematological: Does not bruise/bleed easily.   Psychiatric/Behavioral: Positive for sleep disturbance. Negative for agitation and confusion. The patient is nervous/anxious.         RYANNE:   RYANNE-7 SCORE 6/5/2018  10/12/2018 4/18/2019   Total Score 0 3 0     PHQ9:  PHQ-9 SCORE 6/5/2018 10/12/2018 4/18/2019   PHQ-9 Total Score 0 7 0       I have personally reviewed the past medical history, past surgical history, medications, allergies, family and social history as listed below.     Allergies   Allergen Reactions     Iodine Shortness Of Breath     Other reaction(s): Runny Nose  Watery eyes     Omeprazole Other (See Comments)     Hives, then respiratory distress  Hives, then respiratory distress       Ticagrelor Nausea and Shortness Of Breath     Abdominal pain, bloating - diarrhea, possible cause of stomach bleeding     Lisinopril Cough     Metoprolol      Other reaction(s): Bradycardia       Current Outpatient Medications   Medication Sig Dispense Refill     albuterol (PROAIR HFA/PROVENTIL HFA/VENTOLIN HFA) 108 (90 Base) MCG/ACT inhaler INHALE 2 PUFFS BY MOUTH FOUR TIMES DAILY AS NEEDED FOR COUGH OR SHORTNESS OF BREATH 18 g 11     ALPRAZolam (XANAX) 0.25 MG tablet Take 0.5-1 tablets (0.125-0.25 mg) by mouth daily as needed for anxiety 45 tablet 1     aspirin EC 81 MG EC tablet Take 81 mg by mouth daily with food       clopidogrel (PLAVIX) 75 MG tablet Take 1 tablet (75 mg) by mouth daily 90 tablet 3     fish oil-omega-3 fatty acids 1000 MG capsule Take 500 mg by mouth       furosemide (LASIX) 20 MG tablet Take 0.5-1 tablets by mouth every morning as needed       LANsoprazole (PREVACID) 30 MG DR capsule Take 1 capsule (30 mg) by mouth daily as needed Take 30-60 minutes before a meal. 90 capsule 3     losartan (COZAAR) 50 MG tablet Take 1 tablet (50 mg) by mouth daily 90 tablet 3     multivitamin, therapeutic with minerals (MULTI-VITAMIN) TABS Take 1 tablet by mouth daily       nitroGLYcerin (NITROSTAT) 0.4 MG sublingual tablet Place 0.4 mg under the tongue every 5 minutes as needed       rosuvastatin (CRESTOR) 40 MG tablet Take 1 tablet (40 mg) by mouth daily 90 tablet 3     SACCHAROMYCES BOULARDII PO Take by mouth daily        sertraline (ZOLOFT) 50 MG tablet TAKE ONE AND ONE-HALF TO TWO TABLETS DAILY 180 tablet 3     sildenafil (VIAGRA) 100 MG tablet Take 1 tablet (100 mg) by mouth daily as needed . Take 30 minutes to 4 hours before sexual activity. Maximum of 100mg / 24 hours. 6 tablet 11     sotalol (BETAPACE) 80 MG tablet Take 1 tablet (80 mg) by mouth 2 times daily 180 tablet 3     sucralfate (CARAFATE) 1 GM tablet Take 1 g by mouth 4 times daily (before meals and nightly) as needed for heartburn       tamsulosin (FLOMAX) 0.4 MG capsule Take 2 capsules (0.8 mg) by mouth every evening 180 capsule 3        Patient Active Problem List    Diagnosis Date Noted     Generalized anxiety disorder 04/10/2019     Priority: Medium          Food sticks on swallowing 06/05/2018     Priority: Medium     History of prediabetes 11/22/2017     Priority: Medium     Nocturia 11/22/2017     Priority: Medium     History of ST elevation myocardial infarction (STEMI) 08/23/2016     Priority: Medium     History of tobacco abuse 08/23/2016     Priority: Medium     ST elevation myocardial infarction (STEMI) (H) 08/11/2016     Priority: Medium     Chronic diastolic heart failure (H) 02/03/2016     Priority: Medium     Olecranon bursitis of left elbow 02/03/2016     Priority: Medium     Seasonal allergies 09/14/2015     Priority: Medium     PVC's (premature ventricular contractions) 08/13/2015     Priority: Medium     Bilateral leg edema 08/12/2014     Priority: Medium     Hx of right coronary artery stent placement 08/12/2014     Priority: Medium     Old inferior wall myocardial infarction 08/12/2014     Priority: Medium     ED (erectile dysfunction) 07/29/2013     Priority: Medium     Constipation 09/21/2011     Priority: Medium     Malaise and fatigue 09/21/2011     Priority: Medium     Anxiety 04/22/2011     Priority: Medium     Coronary artery disease due to lipid rich plaque 04/22/2011     Priority: Medium     Overview:   Inferior Infarct June 2008 BMS  placed to distal RCA  Non transmural MI march 2010.late stenosis to [facility]       GERD (gastroesophageal reflux disease) 04/22/2011     Priority: Medium     History of major depression 02/03/2011     Priority: Medium     Overview:   With somatization       Palpitations 02/03/2011     Priority: Medium     Ischemic heart disease, chronic 12/13/2010     Priority: Medium     Mixed hyperlipidemia 12/13/2010     Priority: Medium     Dysthymia 11/22/2010     Priority: Medium     Gastroesophageal reflux disease 11/22/2010     Priority: Medium     Benign essential hypertension 03/22/2010     Priority: Medium     Coronary arteriosclerosis in native artery 06/20/2008     Priority: Medium     Overview:   IMO Update 10/11       Past Medical History:   Diagnosis Date     Anxiety disorder     No Comments Provided     Atherosclerotic heart disease of native coronary artery without angina pectoris     Inferior Infarct June 2008 BMS placed to distal RCA Non transmural MI march 2010.late stenosis to RCA 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 2 and 1/2 packs of Camels for 40-45 years. Currently smokes a pipe.     Past Surgical History:   Procedure Laterality Date     ARTHROSCOPY KNEE      lateral release, left knee     BACK SURGERY      age 19,fractured coccyx     COLONOSCOPY  2011,Diverticulosis. No polyps.  Next due .     ESOPHAGOSCOPY, GASTROSCOPY, DUODENOSCOPY (EGD), COMBINED      11,no HH, no GERD (one interpretation of this might be that the PPI has been successful in preventing injury.)     FINGER SURGERY      right hand repair     HEART CATH, ANGIOPLASTY      2008,   3/2009,right coronary stents     TONSILLECTOMY      age 7     Social History     Socioeconomic History     Marital status:      Spouse name: Arielle     Number of children: None     Years of education: None     Highest education level: None   Occupational History     None   Social Needs     Financial resource strain: None     Food insecurity:     Worry: None     Inability: None     Transportation needs:     Medical: None     Non-medical: None   Tobacco Use     Smoking status: Former Smoker     Packs/day: 0.00     Years: 45.00     Pack years: 0.00     Types: Pipe     Last attempt to quit: 2000     Years since quittin.3     Smokeless tobacco: Current User     Types: Chew     Last attempt to quit: 1979     Tobacco comment: Quit smoking: -- No longer using cigarettes, occasionally smokes a pipe   Substance and Sexual Activity     Alcohol use: No     Alcohol/week: 0.0 oz     Drug use: No     Sexual activity: Not Currently   Lifestyle     Physical activity:     Days per week: None     Minutes per session: None     Stress: None   Relationships     Social connections:     Talks on phone: None     Gets together: None     Attends Restorationism service: None     Active member of club or organization: None     Attends meetings of clubs or organizations: None      "Relationship status: None     Intimate partner violence:     Fear of current or ex partner: None     Emotionally abused: None     Physically abused: None     Forced sexual activity: None   Other Topics Concern     Parent/sibling w/ CABG, MI or angioplasty before 65F 55M? 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     Family History   Problem Relation Age of Onset     Heart Disease Father         Heart Disease,CAD     Cancer Father         Cancer,Bladder     Diabetes Father         Diabetes     Family History Negative Brother         Good Health,x5     Family History Negative Sister         Good Health,x2       EXAM:   Vitals:    04/18/19 1108   BP: 104/66   BP Location: Right arm   Patient Position: Sitting   Cuff Size: Adult Regular   Pulse: 56   Resp: 16   Temp: 96.9  F (36.1  C)   TempSrc: Tympanic   Weight: 101.8 kg (224 lb 6 oz)   Height: 1.765 m (5' 9.5\")       Current Pain Score: Moderate Pain (5)     BP Readings from Last 3 Encounters:   04/18/19 104/66   10/12/18 102/68   08/30/18 130/80      Wt Readings from Last 3 Encounters:   04/18/19 101.8 kg (224 lb 6 oz)   10/12/18 119.7 kg (264 lb)   08/30/18 120.4 kg (265 lb 6.4 oz)      Estimated body mass index is 32.66 kg/m  as calculated from the following:    Height as of this encounter: 1.765 m (5' 9.5\").    Weight as of this encounter: 101.8 kg (224 lb 6 oz).     Physical Exam   Constitutional: He appears well-developed and well-nourished. No distress.   HENT:   Head: Normocephalic and atraumatic.   Eyes: Conjunctivae are normal. No scleral icterus.   Neck: Neck supple.   Cardiovascular: Normal rate and regular rhythm.   Pulmonary/Chest: Effort normal.   Abdominal: Soft. There is no tenderness.   Musculoskeletal: He exhibits no deformity.   Lymphadenopathy:     He has no cervical adenopathy.   Neurological: He is alert.   Skin: Skin is warm and dry. No rash noted. He is not diaphoretic. "   Psychiatric: He has a normal mood and affect.        Procedures   INVESTIGATIONS:  Results for orders placed or performed in visit on 03/14/19   Lipid Profile   Result Value Ref Range    Cholesterol 152 <200 mg/dL    Triglycerides 117 <150 mg/dL    HDL Cholesterol 40 23 - 92 mg/dL    LDL Cholesterol Calculated 89 <100 mg/dL    Non HDL Cholesterol 112 <130 mg/dL   Hemoglobin A1c   Result Value Ref Range    Hemoglobin A1C 5.3 4.0 - 6.0 %   CBC with platelets   Result Value Ref Range    WBC 5.9 4.0 - 11.0 10e9/L    RBC Count 5.19 4.4 - 5.9 10e12/L    Hemoglobin 16.4 13.3 - 17.7 g/dL    Hematocrit 45.5 40.0 - 53.0 %    MCV 88 78 - 100 fl    MCH 31.6 26.5 - 33.0 pg    MCHC 36.0 31.5 - 36.5 g/dL    RDW 12.8 10.0 - 15.0 %    Platelet Count 275 150 - 450 10e9/L   Comprehensive metabolic panel   Result Value Ref Range    Sodium 137 134 - 144 mmol/L    Potassium 4.4 3.5 - 5.1 mmol/L    Chloride 104 98 - 107 mmol/L    Carbon Dioxide 30 21 - 31 mmol/L    Anion Gap 3 3 - 14 mmol/L    Glucose 93 70 - 105 mg/dL    Urea Nitrogen 13 7 - 25 mg/dL    Creatinine 0.93 0.70 - 1.30 mg/dL    GFR Estimate 81 >60 mL/min/[1.73_m2]    GFR Estimate If Black >90 >60 mL/min/[1.73_m2]    Calcium 9.3 8.6 - 10.3 mg/dL    Bilirubin Total 0.5 0.3 - 1.0 mg/dL    Albumin 4.0 3.5 - 5.7 g/dL    Protein Total 5.9 (L) 6.4 - 8.9 g/dL    Alkaline Phosphatase 60 34 - 104 U/L    ALT 12 7 - 52 U/L    AST 17 13 - 39 U/L   *UA reflex to Microscopic   Result Value Ref Range    Color Urine Yellow     Appearance Urine Clear     Glucose Urine Negative NEG^Negative mg/dL    Bilirubin Urine Small (A) NEG^Negative    Ketones Urine Trace (A) NEG^Negative mg/dL    Specific Gravity Urine 1.025 1.000 - 1.030    Blood Urine Trace (A) NEG^Negative    pH Urine 5.5 5.0 - 9.0 pH    Protein Albumin Urine Negative NEG^Negative mg/dL    Urobilinogen Urine 0.2 0.2 - 1.0 EU/dL    Nitrite Urine Negative NEG^Negative    Leukocyte Esterase Urine Negative NEG^Negative    Source  Midstream Urine    Urine Microscopic   Result Value Ref Range    WBC Urine 0 - 5 OTO5^0 - 5 /HPF    RBC Urine O - 2 OTO2^O - 2 /HPF       ASSESSMENT AND PLAN:  Problem List Items Addressed This Visit        Endocrine    Mixed hyperlipidemia       Circulatory    Chronic diastolic heart failure (H)    Coronary artery disease due to lipid rich plaque    Benign essential hypertension    PVC's (premature ventricular contractions)       Urinary    ED (erectile dysfunction)    Relevant Medications    sildenafil (VIAGRA) 100 MG tablet       Behavioral    Generalized anxiety disorder    Relevant Medications    ALPRAZolam (XANAX) 0.25 MG tablet      Other Visit Diagnoses     Panic attacks    -  Primary    Relevant Medications    ALPRAZolam (XANAX) 0.25 MG tablet    Heartburn        Relevant Medications    LANsoprazole (PREVACID) 30 MG DR capsule        reviewed diet, exercise and weight control, cardiovascular risk and specific lipid/LDL goals reviewed  -- Expected clinical course discussed    -- Medications and their side effects discussed    Patient Instructions   Medications refilled.     Continue to limit Xanax use - as needed for panic attacks.     -- Try a Super-B-Complex with B12 -- every other day -- to help energy / mood and balance.     -- Consider Under the tongue / Liquid. ([employer])       To help with weight loss and improve blood sugar control....    -- Try to avoid Carbohydrates as much as possible -- breads, pasta, baked goods, cakes, oatmeal, cold cereal, potatoes.   These are turned to sugar in one metabolic conversion, cause insulin secretion and increased fat deposition / weight gain.      -- Eat more lean meats, proteins, eggs, nuts, vegetables.       Vitamin D3 is a safe and effective product for the treatment and prevention of osteoporosis, rickets and vitamin D deficiency. Most people living in this part of the northern [place] are vitamin D deficient.    -- Start taking 2000 international unit(s) of  vitamin D3 daily with food as it is a fat soluble vitamin.     Vitamin D3 is associated with improved immunity, improved bone health, improved mood and in some people a reduction in pain severity.        Return as needed for follow-up with Dr. Adams -- for medication refills, before out of Xanax.     Clinic : [phone]  Appointment line: [phone]     Edenilson Adams MD  Internal Medicine  [facility]     Portions of this note were dictated using speech recognition software. The note has been proofread but errors in the text may have been overlooked. Please contact me if there are any concerns regarding the accuracy of the dictation.

## 2019-04-18 NOTE — NURSING NOTE
"Patient presents to the clinic for medication management, cardiology manages several medications.      Chief Complaint   Patient presents with     Recheck Medication       Initial /66 (BP Location: Right arm, Patient Position: Sitting, Cuff Size: Adult Regular)   Pulse 56   Temp 96.9  F (36.1  C) (Tympanic)   Resp 16   Ht 1.765 m (5' 9.5\")   Wt 101.8 kg (224 lb 6 oz)   BMI 32.66 kg/m   Estimated body mass index is 32.66 kg/m  as calculated from the following:    Height as of this encounter: 1.765 m (5' 9.5\").    Weight as of this encounter: 101.8 kg (224 lb 6 oz).  Medication Reconciliation: complete    Gardenia Childs LPN    "

## 2019-04-18 NOTE — PATIENT INSTRUCTIONS
Medications refilled.     Continue to limit Xanax use - as needed for panic attacks.     -- Try a Super-B-Complex with B12 -- every other day -- to help energy / mood and balance.     -- Consider Under the tongue / Liquid. ([employer])       To help with weight loss and improve blood sugar control....    -- Try to avoid Carbohydrates as much as possible -- breads, pasta, baked goods, cakes, oatmeal, cold cereal, potatoes.   These are turned to sugar in one metabolic conversion, cause insulin secretion and increased fat deposition / weight gain.      -- Eat more lean meats, proteins, eggs, nuts, vegetables.       Vitamin D3 is a safe and effective product for the treatment and prevention of osteoporosis, rickets and vitamin D deficiency. Most people living in this part of the northern [place] are vitamin D deficient.    -- Start taking 2000 international unit(s) of vitamin D3 daily with food as it is a fat soluble vitamin.     Vitamin D3 is associated with improved immunity, improved bone health, improved mood and in some people a reduction in pain severity.        Return as needed for follow-up with Dr. Adams -- for medication refills, before out of Xanax.     Clinic : [phone]  Appointment line: [phone]

## 2019-04-19 ASSESSMENT — ANXIETY QUESTIONNAIRES: GAD7 TOTAL SCORE: 0

## 2019-04-22 RX ORDER — NITROGLYCERIN 0.4 MG/1
TABLET SUBLINGUAL
Qty: 25 TABLET | Refills: 3 | Status: SHIPPED | OUTPATIENT
Start: 2019-04-22 | End: 2021-10-27

## 2019-04-22 NOTE — TELEPHONE ENCOUNTER
Routing refill request to provider for review/approval because:   Pt is not on erectile dysfunction medications    Sublingual nitro order needs review       LOV: 4/18/19  Alexandra Jiménez RN on 4/22/2019 at 3:14 PM

## 2019-05-22 DIAGNOSIS — F34.1 DYSTHYMIA: ICD-10-CM

## 2019-05-22 DIAGNOSIS — I25.83 CORONARY ARTERY DISEASE DUE TO LIPID RICH PLAQUE: ICD-10-CM

## 2019-05-22 DIAGNOSIS — I25.10 CORONARY ARTERY DISEASE DUE TO LIPID RICH PLAQUE: ICD-10-CM

## 2019-05-23 DIAGNOSIS — F34.1 DYSTHYMIA: ICD-10-CM

## 2019-05-24 RX ORDER — CLOPIDOGREL BISULFATE 75 MG/1
TABLET ORAL
Qty: 90 TABLET | Refills: 3 | Status: SHIPPED | OUTPATIENT
Start: 2019-05-24 | End: 2020-10-23

## 2019-05-24 NOTE — TELEPHONE ENCOUNTER
Routing refill request to provider for review/approval because:  Drug interaction warning - on a PPI    LOV 4-18-19.  Plavix filled on 2-26-18 for #90 X 3 refills. Nancy Velasquez RN on 5/24/2019 at 12:28 PM

## 2019-09-03 ENCOUNTER — MYC MEDICAL ADVICE (OUTPATIENT)
Dept: INTERNAL MEDICINE | Facility: OTHER | Age: 69
End: 2019-09-03

## 2019-09-03 NOTE — TELEPHONE ENCOUNTER
Patient had DM labs checked on 3-14-19, no abnormal values in Lipid Panel.  Standing orders are still good-no future lab appointment/office visit present at this time.      Gardenia Childs LPN 9/3/2019 9:31 AM

## 2019-09-03 NOTE — TELEPHONE ENCOUNTER
Patient returned call.  He is going to have labs done while he is in with Susan Carroll NP and a DM follow-up appointment was made with Dr. Adams on 9.20.2019.  Keira De Jesus on 9/3/2019 at 3:18 PM

## 2019-09-03 NOTE — TELEPHONE ENCOUNTER
Left message with wife to have patient call back to schedule for the labs and DM check.  Keira De Jesus on 9/3/2019 at 2:50 PM

## 2019-09-03 NOTE — TELEPHONE ENCOUNTER
Please call and schedule lab only appt and then DM clinic appointment a few days later.     Edenilson Adams MD

## 2019-09-06 ENCOUNTER — TRANSFERRED RECORDS (OUTPATIENT)
Dept: HEALTH INFORMATION MANAGEMENT | Facility: OTHER | Age: 69
End: 2019-09-06

## 2019-09-06 ENCOUNTER — HOSPITAL ENCOUNTER (OUTPATIENT)
Dept: GENERAL RADIOLOGY | Facility: OTHER | Age: 69
Discharge: HOME | End: 2019-09-06
Attending: NURSE PRACTITIONER | Admitting: NURSE PRACTITIONER
Payer: COMMERCIAL

## 2019-09-06 ENCOUNTER — OFFICE VISIT (OUTPATIENT)
Dept: FAMILY MEDICINE | Facility: OTHER | Age: 69
End: 2019-09-06
Attending: NURSE PRACTITIONER
Payer: COMMERCIAL

## 2019-09-06 VITALS
HEART RATE: 71 BPM | DIASTOLIC BLOOD PRESSURE: 70 MMHG | HEIGHT: 70 IN | RESPIRATION RATE: 16 BRPM | OXYGEN SATURATION: 97 % | SYSTOLIC BLOOD PRESSURE: 110 MMHG | WEIGHT: 213.25 LBS | TEMPERATURE: 97.1 F | BODY MASS INDEX: 30.53 KG/M2

## 2019-09-06 DIAGNOSIS — R22.1 NECK MASS: Primary | ICD-10-CM

## 2019-09-06 DIAGNOSIS — M54.2 NECK PAIN: ICD-10-CM

## 2019-09-06 DIAGNOSIS — I10 BENIGN ESSENTIAL HYPERTENSION: ICD-10-CM

## 2019-09-06 DIAGNOSIS — E78.2 MIXED HYPERLIPIDEMIA: ICD-10-CM

## 2019-09-06 DIAGNOSIS — R73.9 ELEVATED RANDOM BLOOD GLUCOSE LEVEL: ICD-10-CM

## 2019-09-06 DIAGNOSIS — R22.1 NECK MASS: ICD-10-CM

## 2019-09-06 LAB
ALBUMIN SERPL-MCNC: 4 G/DL (ref 3.5–5.7)
ALBUMIN UR-MCNC: NEGATIVE MG/DL
ALP SERPL-CCNC: 50 U/L (ref 34–104)
ALT SERPL W P-5'-P-CCNC: 11 U/L (ref 7–52)
ANION GAP SERPL CALCULATED.3IONS-SCNC: 5 MMOL/L (ref 3–14)
APPEARANCE UR: CLEAR
AST SERPL W P-5'-P-CCNC: 17 U/L (ref 13–39)
BILIRUB SERPL-MCNC: 0.7 MG/DL (ref 0.3–1)
BILIRUB UR QL STRIP: ABNORMAL
BUN SERPL-MCNC: 13 MG/DL (ref 7–25)
CALCIUM SERPL-MCNC: 9.3 MG/DL (ref 8.6–10.3)
CHLORIDE SERPL-SCNC: 100 MMOL/L (ref 98–107)
CHOLEST SERPL-MCNC: 262 MG/DL
CO2 SERPL-SCNC: 32 MMOL/L (ref 21–31)
COLOR UR AUTO: YELLOW
CREAT SERPL-MCNC: 0.97 MG/DL (ref 0.7–1.3)
ERYTHROCYTE [DISTWIDTH] IN BLOOD BY AUTOMATED COUNT: 12.6 % (ref 10–15)
GFR SERPL CREATININE-BSD FRML MDRD: 77 ML/MIN/{1.73_M2}
GLUCOSE SERPL-MCNC: 99 MG/DL (ref 70–105)
GLUCOSE UR STRIP-MCNC: NEGATIVE MG/DL
HBA1C MFR BLD: 5.5 % (ref 4–6)
HCT VFR BLD AUTO: 47.7 % (ref 40–53)
HDLC SERPL-MCNC: 46 MG/DL (ref 23–92)
HGB BLD-MCNC: 16.3 G/DL (ref 13.3–17.7)
HGB UR QL STRIP: NEGATIVE
KETONES UR STRIP-MCNC: NEGATIVE MG/DL
LDLC SERPL CALC-MCNC: 194 MG/DL
LEUKOCYTE ESTERASE UR QL STRIP: NEGATIVE
MCH RBC QN AUTO: 32 PG (ref 26.5–33)
MCHC RBC AUTO-ENTMCNC: 34.2 G/DL (ref 31.5–36.5)
MCV RBC AUTO: 94 FL (ref 78–100)
NITRATE UR QL: NEGATIVE
NONHDLC SERPL-MCNC: 216 MG/DL
PH UR STRIP: 5 PH (ref 5–9)
PLATELET # BLD AUTO: 265 10E9/L (ref 150–450)
POTASSIUM SERPL-SCNC: 4.4 MMOL/L (ref 3.5–5.1)
PROT SERPL-MCNC: 6 G/DL (ref 6.4–8.9)
RBC # BLD AUTO: 5.09 10E12/L (ref 4.4–5.9)
SODIUM SERPL-SCNC: 137 MMOL/L (ref 134–144)
SOURCE: ABNORMAL
SP GR UR STRIP: >1.03 (ref 1–1.03)
TRIGL SERPL-MCNC: 108 MG/DL
UROBILINOGEN UR STRIP-ACNC: 0.2 EU/DL (ref 0.2–1)
WBC # BLD AUTO: 5.2 10E9/L (ref 4–11)

## 2019-09-06 PROCEDURE — 80061 LIPID PANEL: CPT | Mod: ZL | Performed by: INTERNAL MEDICINE

## 2019-09-06 PROCEDURE — 83036 HEMOGLOBIN GLYCOSYLATED A1C: CPT | Mod: ZL | Performed by: INTERNAL MEDICINE

## 2019-09-06 PROCEDURE — 81003 URINALYSIS AUTO W/O SCOPE: CPT | Mod: ZL | Performed by: INTERNAL MEDICINE

## 2019-09-06 PROCEDURE — 80053 COMPREHEN METABOLIC PANEL: CPT | Mod: ZL | Performed by: INTERNAL MEDICINE

## 2019-09-06 PROCEDURE — 36415 COLL VENOUS BLD VENIPUNCTURE: CPT | Mod: ZL | Performed by: INTERNAL MEDICINE

## 2019-09-06 PROCEDURE — 99214 OFFICE O/P EST MOD 30 MIN: CPT | Performed by: NURSE PRACTITIONER

## 2019-09-06 PROCEDURE — 85027 COMPLETE CBC AUTOMATED: CPT | Mod: ZL | Performed by: INTERNAL MEDICINE

## 2019-09-06 PROCEDURE — 72040 X-RAY EXAM NECK SPINE 2-3 VW: CPT

## 2019-09-06 SDOH — HEALTH STABILITY: MENTAL HEALTH: HOW OFTEN DO YOU HAVE A DRINK CONTAINING ALCOHOL?: NEVER

## 2019-09-06 ASSESSMENT — ANXIETY QUESTIONNAIRES
IF YOU CHECKED OFF ANY PROBLEMS ON THIS QUESTIONNAIRE, HOW DIFFICULT HAVE THESE PROBLEMS MADE IT FOR YOU TO DO YOUR WORK, TAKE CARE OF THINGS AT HOME, OR GET ALONG WITH OTHER PEOPLE: NOT DIFFICULT AT ALL
6. BECOMING EASILY ANNOYED OR IRRITABLE: NOT AT ALL
GAD7 TOTAL SCORE: 0
3. WORRYING TOO MUCH ABOUT DIFFERENT THINGS: NOT AT ALL
2. NOT BEING ABLE TO STOP OR CONTROL WORRYING: NOT AT ALL
7. FEELING AFRAID AS IF SOMETHING AWFUL MIGHT HAPPEN: NOT AT ALL
5. BEING SO RESTLESS THAT IT IS HARD TO SIT STILL: NOT AT ALL
1. FEELING NERVOUS, ANXIOUS, OR ON EDGE: NOT AT ALL

## 2019-09-06 ASSESSMENT — MIFFLIN-ST. JEOR: SCORE: 1745.61

## 2019-09-06 ASSESSMENT — PATIENT HEALTH QUESTIONNAIRE - PHQ9
SUM OF ALL RESPONSES TO PHQ QUESTIONS 1-9: 0
5. POOR APPETITE OR OVEREATING: NOT AT ALL

## 2019-09-06 ASSESSMENT — PAIN SCALES - GENERAL: PAINLEVEL: MILD PAIN (3)

## 2019-09-06 NOTE — LETTER
September 6, 2019      Héctor Thornton  [address] [place] [id number]        Dear ,    We are writing to inform you of your test results.    Labs are stable.     Resulted Orders   Lipid Profile   Result Value Ref Range    Cholesterol 262 (H) <200 mg/dL    Triglycerides 108 <150 mg/dL    HDL Cholesterol 46 23 - 92 mg/dL    LDL Cholesterol Calculated 194 (H) <100 mg/dL      Comment:      Above desirable:  100-129 mg/dl  Borderline High:  130-159 mg/dL  High:             160-189 mg/dL  Very high:       >189 mg/dl      Non HDL Cholesterol 216 (H) <130 mg/dL      Comment:      Above Desirable:  130-159 mg/dl  Borderline high:  160-189 mg/dl  High:             190-219 mg/dl  Very high:       >219 mg/dl     Hemoglobin A1c   Result Value Ref Range    Hemoglobin A1C 5.5 4.0 - 6.0 %   CBC with platelets   Result Value Ref Range    WBC 5.2 4.0 - 11.0 10e9/L    RBC Count 5.09 4.4 - 5.9 10e12/L    Hemoglobin 16.3 13.3 - 17.7 g/dL    Hematocrit 47.7 40.0 - 53.0 %    MCV 94 78 - 100 fl    MCH 32.0 26.5 - 33.0 pg    MCHC 34.2 31.5 - 36.5 g/dL    RDW 12.6 10.0 - 15.0 %    Platelet Count 265 150 - 450 10e9/L   Comprehensive metabolic panel   Result Value Ref Range    Sodium 137 134 - 144 mmol/L    Potassium 4.4 3.5 - 5.1 mmol/L    Chloride 100 98 - 107 mmol/L    Carbon Dioxide 32 (H) 21 - 31 mmol/L    Anion Gap 5 3 - 14 mmol/L    Glucose 99 70 - 105 mg/dL    Urea Nitrogen 13 7 - 25 mg/dL    Creatinine 0.97 0.70 - 1.30 mg/dL    GFR Estimate 77 >60 mL/min/[1.73_m2]    GFR Estimate If Black >90 >60 mL/min/[1.73_m2]    Calcium 9.3 8.6 - 10.3 mg/dL    Bilirubin Total 0.7 0.3 - 1.0 mg/dL    Albumin 4.0 3.5 - 5.7 g/dL    Protein Total 6.0 (L) 6.4 - 8.9 g/dL    Alkaline Phosphatase 50 34 - 104 U/L    ALT 11 7 - 52 U/L    AST 17 13 - 39 U/L   *UA reflex to Microscopic   Result Value Ref Range    Color Urine Yellow     Appearance Urine Clear     Glucose Urine Negative NEG^Negative mg/dL    Bilirubin Urine Small (A)  NEG^Negative      Comment:      This is an unconfirmed screening test result. A positive result may be false.    Ketones Urine Negative NEG^Negative mg/dL    Specific Gravity Urine >1.030 (H) 1.000 - 1.030    Blood Urine Negative NEG^Negative    pH Urine 5.0 5.0 - 9.0 pH    Protein Albumin Urine Negative NEG^Negative mg/dL    Urobilinogen Urine 0.2 0.2 - 1.0 EU/dL    Nitrite Urine Negative NEG^Negative    Leukocyte Esterase Urine Negative NEG^Negative    Source Midstream Urine        If you have any questions or concerns, please call the clinic at the number listed above.       Sincerely,        DAVON Berg CNP

## 2019-09-06 NOTE — PROGRESS NOTES
HPI:    Héctor Thornton is a 68 year old male who presents to clinic today for left-sided neck pain and swelling.  He reports this started about 3 months ago where occasionally when he turns his head to the left he would feel a catch and a click that was somewhat tender.  Now this is happening pretty constantly.  His  neck feels stiff and he feels a bulging to the left side that is somewhat painful.  Denies any recent cold symptoms.  No lymph node enlargement.  No trouble swallowing.  He has not done anything to manage his symptoms.  He was getting massages weekly but about 2 months ago had to stop getting massages into the neck area due to the pain.  He does smoke a pipe regularly.  He has been working on weight loss and exercise as well as dietary changes and has had recent expected weight loss.    Past Medical History:   Diagnosis Date     Anxiety disorder     No Comments Provided     Atherosclerotic heart disease of native coronary artery without angina pectoris     Inferior Infarct June 2008 BMS placed to distal RCA Non transmural MI march 2010.late stenosis [facility]     Chest pain     No Comments Provided     Chronic ischemic heart disease     No Comments Provided     Constipation     No Comments Provided     Epigastric pain     No Comments Provided     Essential (primary) hypertension     No Comments Provided     Gastro-esophageal reflux disease without esophagitis     No Comments Provided     Generalized anxiety disorder     No Comments Provided     Hyperlipidemia     No Comments Provided     Major depressive disorder, recurrent severe without psychotic features (H)     With somatization     Major depressive disorder, single episode     RX with Paxil Spring 2011     Male erectile dysfunction     No Comments Provided     Other fatigue     No Comments Provided     Other injury of unspecified body region, initial encounter (CODE)     No Comments Provided     Palpitations     No  Comments Provided     Personal history of other endocrine, nutritional and metabolic disease     high cholestserol     Tobacco use     Current user . 2 and 1/2 packs of Camels for 40-45 years. Currently smokes a pipe.       Past Surgical History:   Procedure Laterality Date     ARTHROSCOPY KNEE      lateral release, left knee     BACK SURGERY      age 19,fractured coccyx     COLONOSCOPY  2011,Diverticulosis. No polyps.  Next due .     ESOPHAGOSCOPY, GASTROSCOPY, DUODENOSCOPY (EGD), COMBINED      11,no HH, no GERD (one interpretation of this might be that the PPI has been successful in preventing injury.)     FINGER SURGERY      right hand repair     HEART CATH, ANGIOPLASTY      2008,   3/2009,right coronary stents     TONSILLECTOMY      age 7       Family History   Problem Relation Age of Onset     Heart Disease Father         Heart Disease,CAD     Cancer Father         Cancer,Bladder     Diabetes Father         Diabetes     Family History Negative Brother         Good Health,x5     Family History Negative Sister         Good Health,x2       Social History     Socioeconomic History     Marital status:      Spouse name: Arielle     Number of children: Not on file     Years of education: Not on file     Highest education level: Not on file   Occupational History     Not on file   Social Needs     Financial resource strain: Not on file     Food insecurity:     Worry: Not on file     Inability: Not on file     Transportation needs:     Medical: Not on file     Non-medical: Not on file   Tobacco Use     Smoking status: Former Smoker     Packs/day: 0.00     Years: 45.00     Pack years: 0.00     Types: Pipe     Last attempt to quit: 2000     Years since quittin.6     Smokeless tobacco: Current User     Types: Chew     Last attempt to quit: 1979     Tobacco comment: Quit smoking: -- No longer using cigarettes, occasionally smokes a pipe   Substance and Sexual Activity     Alcohol  use: No     Alcohol/week: 0.0 oz     Frequency: Never     Drug use: No     Sexual activity: Not Currently   Lifestyle     Physical activity:     Days per week: Not on file     Minutes per session: Not on file     Stress: Not on file   Relationships     Social connections:     Talks on phone: Not on file     Gets together: Not on file     Attends [employer] service: Not on file     Active member of club or organization: Not on file     Attends meetings of clubs or organizations: Not on file     Relationship status: Not on file     Intimate partner violence:     Fear of current or ex partner: Not on file     Emotionally abused: Not on file     Physically abused: Not on file     Forced sexual activity: Not on file   Other Topics Concern     Parent/sibling w/ CABG, MI or angioplasty before 65F 55M? 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       Current Outpatient Medications   Medication Sig Dispense Refill     albuterol (PROAIR HFA/PROVENTIL HFA/VENTOLIN HFA) 108 (90 Base) MCG/ACT inhaler INHALE 2 PUFFS BY MOUTH FOUR TIMES DAILY AS NEEDED FOR COUGH OR SHORTNESS OF BREATH 18 g 11     ALPRAZolam (XANAX) 0.25 MG tablet Take 0.5-1 tablets (0.125-0.25 mg) by mouth daily as needed for anxiety 45 tablet 1     aspirin EC 81 MG EC tablet Take 81 mg by mouth daily with food       clopidogrel (PLAVIX) 75 MG tablet TAKE 1 TABLET BY MOUTH EVERY DAY 90 tablet 3     fish oil-omega-3 fatty acids 1000 MG capsule Take 500 mg by mouth       furosemide (LASIX) 20 MG tablet Take 0.5-1 tablets by mouth every morning as needed       LANsoprazole (PREVACID) 30 MG DR capsule Take 1 capsule (30 mg) by mouth daily as needed Take 30-60 minutes before a meal. 90 capsule 3     losartan (COZAAR) 50 MG tablet Take 1 tablet (50 mg) by mouth daily 90 tablet 3     multivitamin, therapeutic with minerals (MULTI-VITAMIN) TABS Take 1 tablet by mouth daily       nitroGLYcerin  "(NITROSTAT) 0.4 MG sublingual tablet DISSOLVE ONE TABLET UNDER THE TONGUE EVERY 5 MINUTES AS NEEDED 25 tablet 3     rosuvastatin (CRESTOR) 40 MG tablet Take 1 tablet (40 mg) by mouth daily 90 tablet 3     SACCHAROMYCES BOULARDII PO Take by mouth daily       sildenafil (VIAGRA) 100 MG tablet Take 1 tablet (100 mg) by mouth daily as needed . Take 30 minutes to 4 hours before sexual activity. Maximum of 100mg / 24 hours. 6 tablet 11     sotalol (BETAPACE) 80 MG tablet Take 1 tablet (80 mg) by mouth 2 times daily 180 tablet 3     sucralfate (CARAFATE) 1 GM tablet Take 1 g by mouth 4 times daily (before meals and nightly) as needed for heartburn       tamsulosin (FLOMAX) 0.4 MG capsule Take 2 capsules (0.8 mg) by mouth every evening 180 capsule 3       Allergies   Allergen Reactions     Iodine Shortness Of Breath     Other reaction(s): Runny Nose  Watery eyes     Omeprazole Other (See Comments)     Hives, then respiratory distress  Hives, then respiratory distress       Ticagrelor Nausea and Shortness Of Breath     Abdominal pain, bloating - diarrhea, possible cause of stomach bleeding     Lisinopril Cough     Metoprolol      Other reaction(s): Bradycardia       ROS:  Pertinent positives and negatives are noted in HPI.    EXAM:  /70 (BP Location: Right arm, Patient Position: Sitting, Cuff Size: Adult Regular)   Pulse 71   Temp 97.1  F (36.2  C) (Tympanic)   Resp 16   Ht 1.765 m (5' 9.5\")   Wt 96.7 kg (213 lb 4 oz)   SpO2 97%   BMI 31.04 kg/m    General appearance: well appearing male, in no acute distress  Head: normocephalic, atraumatic  Ears: TM's with cone of light, no erythema, canals clear bilaterally  Eyes: conjunctivae normal  Oropharynx: moist mucous membranes, tonsils without erythema, exudates or petechiae, no post nasal drip seen  Neck: supple without adenopathy, fullness appreciated to left side of neck  Respiratory: clear to auscultation bilaterally  Cardiac: RRR with no " murmurs  Musculoskeletal: Slightly decreased range of motion to cervical spine due to reports of pain to left side of neck  Dermatological: no rashes or lesions  Psychological: normal affect, alert and pleasant  Xray: xray independently reviewed and no acute change in cervical spine appreciated; pending radiology over-read    ASSESSMENT AND PLAN:    1. Neck mass    2. Neck pain    3. Mixed hyperlipidemia    4. Elevated random blood glucose level    5. Benign essential hypertension      3-month history of left-sided neck symptoms with increased swelling or mass like area appreciated that is tender.  X-ray of cervical spine is benign, with appreciating some soft tissue swelling on left side of the neck on x-ray.  At this time we will move forward with CT of neck with and without contrast.  Due to his history of allergies he will premedicate with Benadryl.  We will follow-up with results when these are available.      DAVON Berg CNP..................9/6/2019 8:31 AM      This document was prepared using voice generated software.  While every attempt was made for accuracy, grammatical errors may exist.

## 2019-09-06 NOTE — NURSING NOTE
Patient presents to clinic today for a lump on left side of neck. He states he noticed it about three months ago.     No LMP for male patient.  Medication Reconciliation: complete    Brittany De Jesus LPN  9/6/2019 8:26 AM

## 2019-09-07 ASSESSMENT — ANXIETY QUESTIONNAIRES: GAD7 TOTAL SCORE: 0

## 2019-09-08 ENCOUNTER — MYC MEDICAL ADVICE (OUTPATIENT)
Dept: INTERNAL MEDICINE | Facility: OTHER | Age: 69
End: 2019-09-08

## 2019-09-09 NOTE — TELEPHONE ENCOUNTER
When looking for soft tissue neck concerns, need IV contrast with a CT scan.....     Would defer to radiology department regarding procedures/medications to take prior to his imaging.    Wondering if he is talking about Xanax or Ativan prior to imaging, in which she would need a  home.    Edenilson Adams MD

## 2019-09-09 NOTE — TELEPHONE ENCOUNTER
Left message for Radiology Department to called back.      Gardenia Childs LPN 9/9/2019 8:06 AM

## 2019-09-09 NOTE — TELEPHONE ENCOUNTER
Patient was instructed to take Benadryl prior to imaging per radiology scheduling-patient was informed of this Friday afternoon.      Gardenia Childs LPN 9/9/2019 8:16 AM

## 2019-09-16 ENCOUNTER — TELEPHONE (OUTPATIENT)
Dept: INTERNAL MEDICINE | Facility: OTHER | Age: 69
End: 2019-09-16

## 2019-09-16 DIAGNOSIS — Z88.8 ALLERGY TO IODINE: Primary | ICD-10-CM

## 2019-09-16 RX ORDER — METHYLPREDNISOLONE 32 MG/1
TABLET ORAL
Qty: 2 TABLET | Refills: 0 | Status: SHIPPED | OUTPATIENT
Start: 2019-09-16 | End: 2019-11-26

## 2019-09-16 NOTE — TELEPHONE ENCOUNTER
Medications ordered per radiology for Iodine allergy.      Gardenia Childs LPN 9/16/2019 11:09 AM

## 2019-09-17 DIAGNOSIS — R35.1 NOCTURIA: ICD-10-CM

## 2019-09-17 RX ORDER — TAMSULOSIN HYDROCHLORIDE 0.4 MG/1
0.8 CAPSULE ORAL EVERY EVENING
Qty: 180 CAPSULE | Refills: 3 | Status: SHIPPED | OUTPATIENT
Start: 2019-09-17 | End: 2020-03-12

## 2019-09-17 NOTE — TELEPHONE ENCOUNTER
"Pt.'s last office visit with Federico Marcial MD was on 8/30/18 and note states:  Plan  \"Continue Flomax 0.4mg twice daily  Follow up in 1 year\"    Sent to MD to address.      Ramona JON, RN on 9/17/2019 at 7:36 AM      "

## 2019-09-18 ENCOUNTER — TELEPHONE (OUTPATIENT)
Dept: INTERNAL MEDICINE | Facility: OTHER | Age: 69
End: 2019-09-18

## 2019-09-18 DIAGNOSIS — F34.1 DYSTHYMIA: ICD-10-CM

## 2019-09-18 NOTE — TELEPHONE ENCOUNTER
"Patient called wanting to know if it would be ok for him to restart taking Sertraline. Patient states his emotions are \"out of wack\".  Patient states he still has some of the medication at home and wants to know if he can restart.     Please call patient at [phone]    Meliza Bear on 9/18/2019 at 10:52 AM  "

## 2019-09-18 NOTE — TELEPHONE ENCOUNTER
After proper verification, patient was relayed the message from below.  Verena Garcia LPN on 9/18/2019 at 3:59 PM

## 2019-09-24 ENCOUNTER — HOSPITAL ENCOUNTER (EMERGENCY)
Facility: HOSPITAL | Age: 69
Discharge: HOME | End: 2019-09-24
Attending: EMERGENCY MEDICINE | Admitting: EMERGENCY MEDICINE
Payer: COMMERCIAL

## 2019-09-24 VITALS
DIASTOLIC BLOOD PRESSURE: 79 MMHG | RESPIRATION RATE: 18 BRPM | SYSTOLIC BLOOD PRESSURE: 126 MMHG | OXYGEN SATURATION: 99 % | TEMPERATURE: 96.5 F

## 2019-09-24 DIAGNOSIS — N47.6 BALANOPOSTHITIS: Primary | ICD-10-CM

## 2019-09-24 LAB
ALBUMIN UR-MCNC: NEGATIVE MG/DL
APPEARANCE UR: CLEAR
BILIRUB UR QL STRIP: NEGATIVE
C TRACH DNA SPEC QL PROBE+SIG AMP: NOT DETECTED
COLOR UR AUTO: NORMAL
GLUCOSE UR STRIP-MCNC: NEGATIVE MG/DL
HGB UR QL STRIP: NEGATIVE
KETONES UR STRIP-MCNC: NEGATIVE MG/DL
LEUKOCYTE ESTERASE UR QL STRIP: NEGATIVE
N GONORRHOEA DNA SPEC QL PROBE+SIG AMP: NOT DETECTED
NITRATE UR QL: NEGATIVE
PH UR STRIP: 7 PH (ref 4.7–8)
SOURCE: NORMAL
SP GR UR STRIP: 1.01 (ref 1–1.03)
SPECIMEN SOURCE: NORMAL
UROBILINOGEN UR STRIP-MCNC: NORMAL MG/DL (ref 0–2)

## 2019-09-24 PROCEDURE — 36415 COLL VENOUS BLD VENIPUNCTURE: CPT | Performed by: EMERGENCY MEDICINE

## 2019-09-24 PROCEDURE — 99283 EMERGENCY DEPT VISIT LOW MDM: CPT

## 2019-09-24 PROCEDURE — 86696 HERPES SIMPLEX TYPE 2 TEST: CPT | Performed by: EMERGENCY MEDICINE

## 2019-09-24 PROCEDURE — 87591 N.GONORRHOEAE DNA AMP PROB: CPT | Performed by: EMERGENCY MEDICINE

## 2019-09-24 PROCEDURE — 87491 CHLMYD TRACH DNA AMP PROBE: CPT | Performed by: EMERGENCY MEDICINE

## 2019-09-24 PROCEDURE — 81003 URINALYSIS AUTO W/O SCOPE: CPT | Performed by: EMERGENCY MEDICINE

## 2019-09-24 PROCEDURE — 99283 EMERGENCY DEPT VISIT LOW MDM: CPT | Mod: Z6 | Performed by: EMERGENCY MEDICINE

## 2019-09-24 PROCEDURE — 86695 HERPES SIMPLEX TYPE 1 TEST: CPT | Performed by: EMERGENCY MEDICINE

## 2019-09-24 RX ORDER — MUPIROCIN CALCIUM 20 MG/G
CREAM TOPICAL 3 TIMES DAILY
Qty: 15 G | Refills: 0 | Status: SHIPPED | OUTPATIENT
Start: 2019-09-24 | End: 2023-10-31

## 2019-09-24 ASSESSMENT — ENCOUNTER SYMPTOMS
FEVER: 0
ABDOMINAL PAIN: 0
SHORTNESS OF BREATH: 0

## 2019-09-24 NOTE — ED NOTES
"Pt to the ED alone with c/o head of penis dark and light colored - \"mottled\" per pt, mild itching and burning when urine gets on his penis. Symptoms started two days ago.  Denies UTI s/sx. No med changes. States it feels like he is \"somewhat erect all the time\".  Reports he may have a possible chemical burn as he is denise and coming in contact with denise materials. Assessment complete. Pt giving urine sample.   "

## 2019-09-24 NOTE — ED PROVIDER NOTES
History     Chief Complaint   Patient presents with     Penis/Scrotum Problem     c/o discoloration to the head of his penis and itching. denies pain. irritation with urinating.      HPI  Héctor Thornton is a 68 year old male who presents to the emergency department with a red painful rash on the glans penis that was noted this morning.  Denies any history of sexually transmitted disease or change of sexual partner recently.  No urethral discharge, urine frequency or dysuria.  No similar complaints from sexual partner.    Allergies:  Allergies   Allergen Reactions     Iodine Shortness Of Breath     Runny Nose  Watery eyes     Omeprazole Other (See Comments)     Hives, then respiratory distress         Ticagrelor Nausea and Shortness Of Breath     Abdominal pain, bloating - diarrhea, possible cause of stomach bleeding     Lisinopril Cough     Metoprolol      Other reaction(s): Bradycardia       Problem List:    Patient Active Problem List    Diagnosis Date Noted     Generalized anxiety disorder 04/10/2019     Priority: Medium          Food sticks on swallowing 06/05/2018     Priority: Medium     History of prediabetes 11/22/2017     Priority: Medium     Nocturia 11/22/2017     Priority: Medium     History of ST elevation myocardial infarction (STEMI) 08/23/2016     Priority: Medium     History of tobacco abuse 08/23/2016     Priority: Medium     ST elevation myocardial infarction (STEMI) (H) 08/11/2016     Priority: Medium     Chronic diastolic heart failure (H) 02/03/2016     Priority: Medium     Olecranon bursitis of left elbow 02/03/2016     Priority: Medium     Seasonal allergies 09/14/2015     Priority: Medium     PVC's (premature ventricular contractions) 08/13/2015     Priority: Medium     Bilateral leg edema 08/12/2014     Priority: Medium     Hx of right coronary artery stent placement 08/12/2014     Priority: Medium     Old inferior wall myocardial infarction 08/12/2014     Priority: Medium     ED  (erectile dysfunction) 07/29/2013     Priority: Medium     Constipation 09/21/2011     Priority: Medium     Malaise and fatigue 09/21/2011     Priority: Medium     Anxiety 04/22/2011     Priority: Medium     Coronary artery disease due to lipid rich plaque 04/22/2011     Priority: Medium     Overview:   Inferior Infarct June 2008 BMS placed to distal RCA  Non transmural MI march 2010.late stenosis to RCA [facility]O placed at [facility]       GERD (gastroesophageal reflux disease) 04/22/2011     Priority: Medium     History of major depression 02/03/2011     Priority: Medium     Overview:   With somatization       Palpitations 02/03/2011     Priority: Medium     Ischemic heart disease, chronic 12/13/2010     Priority: Medium     Mixed hyperlipidemia 12/13/2010     Priority: Medium     Dysthymia 11/22/2010     Priority: Medium     Gastroesophageal reflux disease 11/22/2010     Priority: Medium     Benign essential hypertension 03/22/2010     Priority: Medium     Coronary arteriosclerosis in native artery 06/20/2008     Priority: Medium     Overview:   IMO Update 10/11          Past Medical History:    Past Medical History:   Diagnosis Date     Anxiety disorder      Atherosclerotic heart disease of native coronary artery without angina pectoris      Chest pain      Chronic ischemic heart disease      Constipation      Epigastric pain      Essential (primary) hypertension      Gastro-esophageal reflux disease without esophagitis      Generalized anxiety disorder      Hyperlipidemia      Major depressive disorder, recurrent severe without psychotic features (H)      Major depressive disorder, single episode      Male erectile dysfunction      Other fatigue      Other injury of unspecified body region, initial encounter (CODE)      Palpitations      Personal history of other endocrine, nutritional and metabolic disease      Tobacco use        Past Surgical History:    Past Surgical History:   Procedure Laterality Date      ARTHROSCOPY KNEE      lateral release, left knee     BACK SURGERY      age 19,fractured coccyx     COLONOSCOPY  2011,Diverticulosis. No polyps.  Next due .     ESOPHAGOSCOPY, GASTROSCOPY, DUODENOSCOPY (EGD), COMBINED      11,no HH, no GERD (one interpretation of this might be that the PPI has been successful in preventing injury.)     FINGER SURGERY      right hand repair     HEART CATH, ANGIOPLASTY      2008,   3/2009,right coronary stents     TONSILLECTOMY      age 7       Family History:    Family History   Problem Relation Age of Onset     Heart Disease Father         Heart Disease,CAD     Cancer Father         Cancer,Bladder     Diabetes Father         Diabetes     Family History Negative Brother         Good Health,x5     Family History Negative Sister         Good Health,x2       Social History:  Marital Status:   [2]  Social History     Tobacco Use     Smoking status: Former Smoker     Packs/day: 0.00     Years: 45.00     Pack years: 0.00     Types: Pipe     Last attempt to quit: 2000     Years since quittin.7     Smokeless tobacco: Current User     Types: Chew     Last attempt to quit: 1979     Tobacco comment: Quit smoking: -- No longer using cigarettes, occasionally smokes a pipe   Substance Use Topics     Alcohol use: No     Alcohol/week: 0.0 standard drinks     Frequency: Never     Drug use: No        Medications:    aspirin EC 81 MG EC tablet  clopidogrel (PLAVIX) 75 MG tablet  fish oil-omega-3 fatty acids 1000 MG capsule  losartan (COZAAR) 50 MG tablet  multivitamin, therapeutic with minerals (MULTI-VITAMIN) TABS  mupirocin (BACTROBAN) 2 % external cream  SACCHAROMYCES BOULARDII PO  sotalol (BETAPACE) 80 MG tablet  tamsulosin (FLOMAX) 0.4 MG capsule  albuterol (PROAIR HFA/PROVENTIL HFA/VENTOLIN HFA) 108 (90 Base) MCG/ACT inhaler  ALPRAZolam (XANAX) 0.25 MG tablet  diphenhydrAMINE (BENADRYL) 50 MG tablet  furosemide (LASIX) 20 MG tablet  LANsoprazole  (PREVACID) 30 MG DR capsule  methylPREDNISolone (MEDROL) 32 MG tablet  nitroGLYcerin (NITROSTAT) 0.4 MG sublingual tablet  rosuvastatin (CRESTOR) 40 MG tablet  sildenafil (VIAGRA) 100 MG tablet  sucralfate (CARAFATE) 1 GM tablet          Review of Systems   Constitutional: Negative for fever.   Respiratory: Negative for shortness of breath.    Cardiovascular: Negative for chest pain.   Gastrointestinal: Negative for abdominal pain.   All other systems reviewed and are negative.      Physical Exam   BP: 126/79  Heart Rate: 60  Temp: 96.5  F (35.8  C)  Resp: 18  SpO2: 99 %      Physical Exam  Vitals signs and nursing note reviewed.   Constitutional:       General: He is not in acute distress.     Appearance: He is well-developed. He is not diaphoretic.   HENT:      Head: Normocephalic and atraumatic.   Eyes:      Pupils: Pupils are equal, round, and reactive to light.   Cardiovascular:      Rate and Rhythm: Normal rate and regular rhythm.      Heart sounds: Normal heart sounds.   Pulmonary:      Effort: Pulmonary effort is normal. No respiratory distress.      Breath sounds: Normal breath sounds. No stridor.   Abdominal:      General: Bowel sounds are normal. There is no distension.      Tenderness: There is no tenderness.   Genitourinary:      Musculoskeletal:         General: No tenderness or deformity.   Neurological:      Mental Status: He is alert and oriented to person, place, and time.      Cranial Nerves: No cranial nerve deficit.         ED Course   And evaluated upon arrival in the ED and laboratory two-sided.  12:30 PM return to patient's room to discuss results with the impact he had already eloped.     Procedures      Results for orders placed or performed during the hospital encounter of 09/24/19 (from the past 24 hour(s))   GC/Chlamydia by PCR - [facility],   Result Value Ref Range    Specimen Source Urine     Neisseria gonorrhoreae PCR Not Detected NDET^Not Detected    Chlamydia Trachomatis PCR Not Detected  NDET^Not Detected   UA reflex to Microscopic and Culture   Result Value Ref Range    Color Urine Light Yellow     Appearance Urine Clear     Glucose Urine Negative NEG^Negative mg/dL    Bilirubin Urine Negative NEG^Negative    Ketones Urine Negative NEG^Negative mg/dL    Specific Gravity Urine 1.007 1.003 - 1.035    Blood Urine Negative NEG^Negative    pH Urine 7.0 4.7 - 8.0 pH    Protein Albumin Urine Negative NEG^Negative mg/dL    Urobilinogen mg/dL Normal 0.0 - 2.0 mg/dL    Nitrite Urine Negative NEG^Negative    Leukocyte Esterase Urine Negative NEG^Negative    Source Midstream Urine        Medications - No data to display    Assessments & Plan (with Medical Decision Making)   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  Normal urinalysis and negative GC chlamydia on urine specimen.  Results of herpes simplex 1 and 2 IgG are still pending (send out).  Patient started on mupirocin cream to apply 3 times daily to the area.  Results of herpes simplex are sent out and will be made available to the patient once available.  Went to discuss results with patient at 12:30 PM but he had already eloped from the emergency department.    I have reviewed the nursing notes.    I have reviewed the findings, diagnosis, plan and need for follow up with the patient.    New Prescriptions    MUPIROCIN (BACTROBAN) 2 % EXTERNAL CREAM    Apply topically 3 times daily       Final diagnoses:   Balanoposthitis       9/24/2019   [facility]     Bk Gamboa MD  09/24/19 [id number]

## 2019-09-24 NOTE — ED NOTES
Dr Gamboa into room to speak with pt and discharge.  States pt is not in his room or bathroom.  Per [facility] report at this time, pt had come to the desk stating he could leave so his bed could be freed up. Pt redirected back to room at that time. Unable to find pt in the ED at this time.

## 2019-09-25 LAB
HSV1 IGG SERPL QL IA: 0.2 AI (ref 0–0.8)
HSV2 IGG SERPL QL IA: <0.2 AI (ref 0–0.8)

## 2019-11-05 ENCOUNTER — TELEPHONE (OUTPATIENT)
Dept: INTERNAL MEDICINE | Facility: OTHER | Age: 69
End: 2019-11-05

## 2019-11-05 NOTE — TELEPHONE ENCOUNTER
A1C last checked on 9-6-19, standing orders are good at this time.  Xanax prescription was dated from 4-18-19.        Gardenia Childs LPN 11/5/2019 11:51 AM

## 2019-11-05 NOTE — TELEPHONE ENCOUNTER
Pt needs AIC Check and has a CT on Mon 11/11/19 and would like an appt to follow up on both on Fri.11/15/19 please call patient

## 2019-11-05 NOTE — TELEPHONE ENCOUNTER
I could see him at 10AM on Friday 11/15. If that works for patient.     May also need a lab only appointment?    Edenilson Adams MD

## 2019-11-06 NOTE — TELEPHONE ENCOUNTER
Patient returned call.  He is unable to come on 11.15 and will wait for the CT results before he makes an appointment.  He is also holding off on the A1C test.  He will plan to do it in March as that would be six months from his last check.  If anything changes he will call and let us know.  Keira De Jesus on 11/6/2019 at 2:12 PM

## 2019-11-11 ENCOUNTER — HOSPITAL ENCOUNTER (OUTPATIENT)
Dept: CT IMAGING | Facility: OTHER | Age: 69
Discharge: HOME | End: 2019-11-11
Attending: NURSE PRACTITIONER | Admitting: NURSE PRACTITIONER
Payer: COMMERCIAL

## 2019-11-11 DIAGNOSIS — R22.1 NECK MASS: ICD-10-CM

## 2019-11-11 DIAGNOSIS — M54.2 NECK PAIN: ICD-10-CM

## 2019-11-11 PROCEDURE — 70491 CT SOFT TISSUE NECK W/DYE: CPT

## 2019-11-11 PROCEDURE — 25500064 ZZH RX 255 OP 636: Performed by: NURSE PRACTITIONER

## 2019-11-11 RX ORDER — IODIXANOL 320 MG/ML
100 INJECTION, SOLUTION INTRAVASCULAR ONCE
Status: COMPLETED | OUTPATIENT
Start: 2019-11-11 | End: 2019-11-11

## 2019-11-11 RX ADMIN — IODIXANOL 100 ML: 320 INJECTION, SOLUTION INTRAVASCULAR at 14:59

## 2019-11-11 NOTE — PROGRESS NOTES
Discussed case with Dr. Joseph, including possible allergy to iodine. Jake authorized contrast via IV. Using visipaque precautionary. Marked mass on neck, verbal handoff to Gerber.

## 2019-11-21 ENCOUNTER — TELEPHONE (OUTPATIENT)
Dept: INTERNAL MEDICINE | Facility: OTHER | Age: 69
End: 2019-11-21

## 2019-11-21 DIAGNOSIS — I10 BENIGN ESSENTIAL HYPERTENSION: ICD-10-CM

## 2019-11-21 RX ORDER — LOSARTAN POTASSIUM 50 MG/1
50 TABLET ORAL DAILY
Qty: 90 TABLET | Refills: 3 | Status: SHIPPED | OUTPATIENT
Start: 2019-11-21 | End: 2019-11-26

## 2019-11-21 NOTE — TELEPHONE ENCOUNTER
After proper verification, confirmed pharmacy for prescription refill, patient stated that he would like to think about going to see Dr. Acosta. He will call back and make an appointment if he chooses to see him.  Verena Garcia LPN on 11/21/2019 at 11:12 AM

## 2019-11-21 NOTE — TELEPHONE ENCOUNTER
I can fill the Losartan.... Need to verify his pharmacy.     If he filed a work comp injury about his neck, but hasn't really had this looked at or evaluated... wondering if it would be a good idea to start with Dr. Acosta --- he can direct and manage his evaluations and follow-up, treatment recommendations for the neck injury.     Edenilson Adams MD

## 2019-11-21 NOTE — TELEPHONE ENCOUNTER
Patient called and would like a an appointment to discuss the neck pain that has been previously discussed and also needs a medication refill it has .   Nieves Berry on 2019 at 9:31 AM

## 2019-11-21 NOTE — TELEPHONE ENCOUNTER
Spoke with patient and an appointment was made with SANNA on 11.26.2019 @ 11:20am for neck pain.  Keira De Jesus on 11/21/2019 at 12:47 PM

## 2019-11-21 NOTE — TELEPHONE ENCOUNTER
After proper verification, patient stated he needed a refill on Losartan as his  and would like to get in sometime between  and  for an appointment for a possible work comp neck injury. Sent to provider to see if work in was available.  Verena Garcia LPN on 2019 at 9:46 AM

## 2019-11-24 NOTE — PROGRESS NOTES
"Nursing Notes:   Gardenia Childs LPN  11/26/2019 11:47 AM  Signed  Patient presents to the clinic for follow up with Neck CT.        Chief Complaint   Patient presents with     Musculoskeletal Problem       Initial BP 98/60 (BP Location: Right arm, Patient Position: Sitting, Cuff Size: Adult Regular)   Pulse 72   Temp 96.7  F (35.9  C) (Tympanic)   Resp 16   Wt 93.9 kg (207 lb)   BMI 30.13 kg/m    Estimated body mass index is 30.13 kg/m  as calculated from the following:    Height as of 9/6/19: 1.765 m (5' 9.5\").    Weight as of this encounter: 93.9 kg (207 lb).  Medication Reconciliation: complete    CELINA Zhou Amy M., LPN  11/26/2019 12:22 PM  Signed  Immunization Documentation    Prior to Immunization administration, verified patients identity using patient's name and date of birth. Please see IMMUNIZATIONS  and order for additional information.  Patient / Parent instructed to remain in clinic for 15 minutes and report any adverse reaction to staff immediately.    Was entire vial of medication used? Yes  Vial/Syringe: Syringe    Gardenia Childs LPN  11/26/2019   12:22 PM    Nursing note reviewed with patient.  Accuracy and completeness verified.   Mr. Thornton is a 69 year old male who:  Patient presents with:  Musculoskeletal Problem      ICD-10-CM    1. Neck pain M54.2    2. Benign essential hypertension I10 DISCONTINUED: losartan (COZAAR) 50 MG tablet   3. Panic attacks F41.0 sertraline (ZOLOFT) 50 MG tablet     ALPRAZolam (XANAX) 0.25 MG tablet   4. Generalized anxiety disorder F41.1 ALPRAZolam (XANAX) 0.25 MG tablet   5. Need for pneumococcal vaccination Z23 PNEUMOCOCCAL VACCINE,ADULT,SQ OR IM   6. Myofascial muscle pain M79.18      HPI  Neck pain, has significant myofascial muscle tenderness as well today of 10 trigger points on exam.  He wants to try some conservative measures.  He has been having pain since back in July or August.  Initially was may be once every couple weeks " and now when he turns his head he might hear a click.  He works at the mines and drives production truck.  Has his arms out on the steering wheel frequently.  Suspect a lot of his myofascial pain and trigger points are due to position and driving.  Recommend some conservative measures.  Home instructions printed and reviewed.    Discussed the possibility of some neck x-rays, at this point he wants to hold off.    Hypertension, blood pressures are actually low.  Stop losartan/Cozaar.    Panic attacks, intermittent.  Continue sertraline and alprazolam.  Needs refills.    Pneumococcal vaccine due today.    Functional Capacity: > 4 METS.   Review of Systems   Constitutional: Negative for chills and fever.   HENT: Negative for congestion and hearing loss.    Eyes: Negative for visual disturbance.   Respiratory: Negative for cough, shortness of breath and wheezing.    Cardiovascular: Negative for chest pain and palpitations.   Gastrointestinal: Negative for abdominal pain, diarrhea, nausea and vomiting.   Endocrine: Negative for cold intolerance and heat intolerance.   Genitourinary: Negative for dysuria and hematuria.   Musculoskeletal: Positive for neck pain. Negative for arthralgias and myalgias.        + restless legs   Skin: Negative for rash and wound.   Allergic/Immunologic: Negative for immunocompromised state.   Neurological: Positive for light-headedness. Negative for dizziness.   Hematological: Does not bruise/bleed easily.   Psychiatric/Behavioral: Positive for sleep disturbance. Negative for agitation and confusion. The patient is nervous/anxious.         Problem List/PMH: reviewed in EMR, and made relevant updates today.  Medications: reviewed in EMR, and made relevant updates today.  Allergies: reviewed in EMR, and made relevant updates today.  I reviewed family and social history and made relevant updates today.  Social History     Tobacco Use     Smoking status: Former Smoker     Packs/day: 0.00      "Years: 45.00     Pack years: 0.00     Types: Pipe     Last attempt to quit: 2000     Years since quittin.9     Smokeless tobacco: Current User     Types: Chew     Last attempt to quit: 1979     Tobacco comment: Quit smoking: -- No longer using cigarettes, occasionally smokes a pipe   Substance Use Topics     Alcohol use: No     Alcohol/week: 0.0 standard drinks     Frequency: Never     Drug use: No      Family History   Problem Relation Age of Onset     Heart Disease Father         Heart Disease,CAD     Cancer Father         Cancer,Bladder     Diabetes Father         Diabetes     Family History Negative Brother         Good Health,x5     Family History Negative Sister         Good Health,x2       EXAM:   Vitals:    19 1125   BP: 98/60   BP Location: Right arm   Patient Position: Sitting   Cuff Size: Adult Regular   Pulse: 72   Resp: 16   Temp: 96.7  F (35.9  C)   TempSrc: Tympanic   Weight: 93.9 kg (207 lb)       Current Pain Score: Mild Pain (2)     BP Readings from Last 3 Encounters:   19 98/60   19 126/79   19 110/70      Wt Readings from Last 3 Encounters:   19 93.9 kg (207 lb)   19 96.7 kg (213 lb 4 oz)   19 101.8 kg (224 lb 6 oz)      Estimated body mass index is 30.13 kg/m  as calculated from the following:    Height as of 19: 1.765 m (5' 9.5\").    Weight as of this encounter: 93.9 kg (207 lb).     Physical Exam  Constitutional:       General: He is not in acute distress.     Appearance: He is well-developed. He is not diaphoretic.   HENT:      Head: Normocephalic and atraumatic.   Eyes:      General: No scleral icterus.     Conjunctiva/sclera: Conjunctivae normal.   Neck:      Musculoskeletal: Neck supple.   Cardiovascular:      Rate and Rhythm: Normal rate and regular rhythm.   Pulmonary:      Effort: Pulmonary effort is normal.      Breath sounds: Normal breath sounds.   Musculoskeletal: Normal range of motion.         General: Tenderness (  + " numerous trigger points with tenderness to palpation in mid, upper back and shoulder/neck areas. ) present. No deformity.   Lymphadenopathy:      Cervical: No cervical adenopathy.   Skin:     General: Skin is warm and dry.      Findings: No rash.   Neurological:      General: No focal deficit present.      Mental Status: He is alert.   Psychiatric:         Mood and Affect: Mood normal.         Behavior: Behavior normal.          Procedures   INVESTIGATIONS:  No results found for any visits on 11/26/19.    ASSESSMENT AND PLAN:  Problem List Items Addressed This Visit        Circulatory    Benign essential hypertension       Behavioral    Generalized anxiety disorder    Relevant Medications    sertraline (ZOLOFT) 50 MG tablet    ALPRAZolam (XANAX) 0.25 MG tablet      Other Visit Diagnoses     Neck pain    -  Primary    Panic attacks        Relevant Medications    sertraline (ZOLOFT) 50 MG tablet    ALPRAZolam (XANAX) 0.25 MG tablet    Need for pneumococcal vaccination        Relevant Orders    PNEUMOCOCCAL VACCINE,ADULT,SQ OR IM (Completed)    Myofascial muscle pain            reviewed diet, exercise and weight control, recommended sodium restriction, cardiovascular risk and specific lipid/LDL goals reviewed  -- Expected clinical course discussed    -- Medications and their side effects discussed    Patient Instructions   Blood pressures are too low.  Stop losartan/Cozaar.  Removed from med list today.    Reduction of sotalol dosing was not effective, this resulted in recurrent heart palpitation events.    Blood pressure checks at home - check some in AM, some in Afternoon, some in Evening and record   -- bring these with you to your next appointment.     Goal blood pressures -- less than 140 and less than 90.    -- Ideally would like the numbers about 110-130 and 70-80.  -- If running higher or lower than this on regular basis, will need to adjust your medications.      Xanax refilled.     Continue sertraline for  about another month to help with mood, whenever desired.... start tapering off as needed.    Pneumococcal 23 vaccine today.    Talk with the pharmacy about  the new shingles vaccine, also called Shingrix.  You get 2 of them within a 6-month span.      Paraspinal Muscle Spasm Instructions  Heat packs to affected areas 20 min on, 20 min off as tolerated.  Take ibuprofen and tylenol as needed for mild-moderate pain.    Start massage to the affected areas.    Consider home TENS unit (by [employer]) -- sold at [employer].     Consider Topical muscle rubs - Icy Hot, Aspercreme (Advanced) with or without lidocaine, Biofreeze, Lidocaine gel, Voltaren gel, Alfredito Emu from [employer], or TheraWorks spray.    Consider Capsaicin cream -- for arthritis  Consider warm water soaks / paraffin wax dips for your hands.     Can also consider trying the Two Old Goats - Essential Oil Lotion (from Online or from [employer]).     Get a Theracane -- or other muscle massage tool to massage the trigger points (painful muscle spots).     Trigger Point Therapy  Your exam shows your pain is coming from one or more trigger points. These points are places where the muscles of the neck, shoulder, back, and legs are tender totouch and cause pain, spasm, and fatigue. This condition can cause headaches, painful neck spasms, and low back pain. Trigger points can be thought of as weak areas of muscle that spasm and form hard knots when under chronic strain or stress. Treatment of trigger points may include medicines to reduce pain and inflammation and relaxthe muscles.  Physical therapy can include several techniques. Deep friction massage over the tender areas can often help relieve pain and stiffness. Relaxation exercises and stretching may be helpful; cervical and lumbartraction can be used in severe cases. Cold therapy and hot packs have both proven helpful. Injection therapy with saline, anesthetics, or cortisone medicines, has also been very successful at  reducing symptoms. Two or moreinjections are often needed over several weeks to gain the greatest benefit. Call your doctor to arrange for further treatment as recommended.     --- consider neck xrays if persistent aches/pains.     Congratulations on the weight loss!! Keep up the hard work!    Return as needed for follow-up with Dr. Adams.    Clinic : [phone]  Appointment line: [phone]     Edenilson Adams MD  Internal Medicine  [facility]     Portions of this note were dictated using speech recognition software. The note has been proofread but errors in the text may have been overlooked. Please contact me if there are any concerns regarding the accuracy of the dictation.

## 2019-11-25 DIAGNOSIS — F41.1 GENERALIZED ANXIETY DISORDER: ICD-10-CM

## 2019-11-25 DIAGNOSIS — F41.0 PANIC ATTACKS: ICD-10-CM

## 2019-11-26 ENCOUNTER — OFFICE VISIT (OUTPATIENT)
Dept: INTERNAL MEDICINE | Facility: OTHER | Age: 69
End: 2019-11-26
Attending: INTERNAL MEDICINE
Payer: COMMERCIAL

## 2019-11-26 VITALS
HEART RATE: 72 BPM | TEMPERATURE: 96.7 F | WEIGHT: 207 LBS | DIASTOLIC BLOOD PRESSURE: 60 MMHG | SYSTOLIC BLOOD PRESSURE: 98 MMHG | BODY MASS INDEX: 30.13 KG/M2 | RESPIRATION RATE: 16 BRPM

## 2019-11-26 DIAGNOSIS — M79.18 MYOFASCIAL MUSCLE PAIN: ICD-10-CM

## 2019-11-26 DIAGNOSIS — I10 BENIGN ESSENTIAL HYPERTENSION: ICD-10-CM

## 2019-11-26 DIAGNOSIS — F41.1 GENERALIZED ANXIETY DISORDER: ICD-10-CM

## 2019-11-26 DIAGNOSIS — Z23 NEED FOR PNEUMOCOCCAL VACCINATION: ICD-10-CM

## 2019-11-26 DIAGNOSIS — F41.0 PANIC ATTACKS: ICD-10-CM

## 2019-11-26 DIAGNOSIS — M54.2 NECK PAIN: Primary | ICD-10-CM

## 2019-11-26 PROCEDURE — 90732 PPSV23 VACC 2 YRS+ SUBQ/IM: CPT | Performed by: INTERNAL MEDICINE

## 2019-11-26 PROCEDURE — 99214 OFFICE O/P EST MOD 30 MIN: CPT | Mod: 25 | Performed by: INTERNAL MEDICINE

## 2019-11-26 PROCEDURE — 90471 IMMUNIZATION ADMIN: CPT | Performed by: INTERNAL MEDICINE

## 2019-11-26 RX ORDER — ALPRAZOLAM 0.25 MG
.125-.25 TABLET ORAL DAILY PRN
Qty: 30 TABLET | Refills: 0 | Status: SHIPPED | OUTPATIENT
Start: 2019-11-26 | End: 2020-09-29

## 2019-11-26 RX ORDER — LOSARTAN POTASSIUM 50 MG/1
25 TABLET ORAL DAILY
COMMUNITY
Start: 2019-11-26 | End: 2019-11-26

## 2019-11-26 ASSESSMENT — ENCOUNTER SYMPTOMS
WHEEZING: 0
SLEEP DISTURBANCE: 1
ARTHRALGIAS: 0
HEMATURIA: 0
VOMITING: 0
NERVOUS/ANXIOUS: 1
DIARRHEA: 0
DIZZINESS: 0
LIGHT-HEADEDNESS: 1
MYALGIAS: 0
PALPITATIONS: 0
BRUISES/BLEEDS EASILY: 0
DYSURIA: 0
WOUND: 0
ABDOMINAL PAIN: 0
COUGH: 0
AGITATION: 0
NECK PAIN: 1
SHORTNESS OF BREATH: 0
FEVER: 0
CONFUSION: 0
CHILLS: 0
NAUSEA: 0

## 2019-11-26 ASSESSMENT — ANXIETY QUESTIONNAIRES
6. BECOMING EASILY ANNOYED OR IRRITABLE: NOT AT ALL
1. FEELING NERVOUS, ANXIOUS, OR ON EDGE: NOT AT ALL
2. NOT BEING ABLE TO STOP OR CONTROL WORRYING: NOT AT ALL
3. WORRYING TOO MUCH ABOUT DIFFERENT THINGS: NOT AT ALL
7. FEELING AFRAID AS IF SOMETHING AWFUL MIGHT HAPPEN: NOT AT ALL
5. BEING SO RESTLESS THAT IT IS HARD TO SIT STILL: NOT AT ALL
GAD7 TOTAL SCORE: 0
IF YOU CHECKED OFF ANY PROBLEMS ON THIS QUESTIONNAIRE, HOW DIFFICULT HAVE THESE PROBLEMS MADE IT FOR YOU TO DO YOUR WORK, TAKE CARE OF THINGS AT HOME, OR GET ALONG WITH OTHER PEOPLE: NOT DIFFICULT AT ALL

## 2019-11-26 ASSESSMENT — PATIENT HEALTH QUESTIONNAIRE - PHQ9
SUM OF ALL RESPONSES TO PHQ QUESTIONS 1-9: 0
5. POOR APPETITE OR OVEREATING: NOT AT ALL

## 2019-11-26 ASSESSMENT — PAIN SCALES - GENERAL: PAINLEVEL: MILD PAIN (2)

## 2019-11-26 NOTE — PATIENT INSTRUCTIONS
Blood pressures are too low.  Stop losartan/Cozaar.  Removed from med list today.    Reduction of sotalol dosing was not effective, this resulted in recurrent heart palpitation events.    Blood pressure checks at home - check some in AM, some in Afternoon, some in Evening and record   -- bring these with you to your next appointment.     Goal blood pressures -- less than 140 and less than 90.    -- Ideally would like the numbers about 110-130 and 70-80.  -- If running higher or lower than this on regular basis, will need to adjust your medications.      Xanax refilled.     Continue sertraline for about another month to help with mood, whenever desired.... start tapering off as needed.    Pneumococcal 23 vaccine today.    Talk with the pharmacy about  the new shingles vaccine, also called Shingrix.  You get 2 of them within a 6-month span.      Paraspinal Muscle Spasm Instructions  Heat packs to affected areas 20 min on, 20 min off as tolerated.  Take ibuprofen and tylenol as needed for mild-moderate pain.    Start massage to the affected areas.    Consider home TENS unit (by IPG) -- sold at [employer].     Consider Topical muscle rubs - Icy Hot, Aspercreme (Advanced) with or without lidocaine, Biofreeze, Lidocaine gel, Voltaren gel, Alfredito Emu from [employer], or TheraWorks spray.    Consider Capsaicin cream -- for arthritis  Consider warm water soaks / paraffin wax dips for your hands.     Can also consider trying the Two Old Goats - Essential Oil Lotion (from Online or from [employer]).     Get a Theracane -- or other muscle massage tool to massage the trigger points (painful muscle spots).     Trigger Point Therapy  Your exam shows your pain is coming from one or more trigger points. These points are places where the muscles of the neck, shoulder, back, and legs are tender totouch and cause pain, spasm, and fatigue. This condition can cause headaches, painful neck spasms, and low back pain. Trigger points can be  thought of as weak areas of muscle that spasm and form hard knots when under chronic strain or stress. Treatment of trigger points may include medicines to reduce pain and inflammation and relaxthe muscles.  Physical therapy can include several techniques. Deep friction massage over the tender areas can often help relieve pain and stiffness. Relaxation exercises and stretching may be helpful; cervical and lumbartraction can be used in severe cases. Cold therapy and hot packs have both proven helpful. Injection therapy with saline, anesthetics, or cortisone medicines, has also been very successful at reducing symptoms. Two or moreinjections are often needed over several weeks to gain the greatest benefit. Call your doctor to arrange for further treatment as recommended.     --- consider neck xrays if persistent aches/pains.     Congratulations on the weight loss!! Keep up the hard work!    Return as needed for follow-up with Dr. Adams.    Clinic : [phone]  Appointment line: [phone]

## 2019-11-26 NOTE — NURSING NOTE
Immunization Documentation    Prior to Immunization administration, verified patients identity using patient's name and date of birth. Please see IMMUNIZATIONS  and order for additional information.  Patient / Parent instructed to remain in clinic for 15 minutes and report any adverse reaction to staff immediately.    Was entire vial of medication used? Yes  Vial/Syringe: Marizol Childs LPN  11/26/2019   12:22 PM

## 2019-11-26 NOTE — NURSING NOTE
"Patient presents to the clinic for follow up with Neck CT.        Chief Complaint   Patient presents with     Musculoskeletal Problem       Initial BP 98/60 (BP Location: Right arm, Patient Position: Sitting, Cuff Size: Adult Regular)   Pulse 72   Temp 96.7  F (35.9  C) (Tympanic)   Resp 16   Wt 93.9 kg (207 lb)   BMI 30.13 kg/m   Estimated body mass index is 30.13 kg/m  as calculated from the following:    Height as of 9/6/19: 1.765 m (5' 9.5\").    Weight as of this encounter: 93.9 kg (207 lb).  Medication Reconciliation: complete    Gardenia Childs LPN    "

## 2019-11-27 ASSESSMENT — ANXIETY QUESTIONNAIRES: GAD7 TOTAL SCORE: 0

## 2019-11-29 RX ORDER — ALPRAZOLAM 0.25 MG
TABLET ORAL
Qty: 45 TABLET | Refills: 0 | OUTPATIENT
Start: 2019-11-29

## 2019-11-29 NOTE — TELEPHONE ENCOUNTER
[facility] in [place], [place] is requesting a refill on the following medication:    : ALPRAZOLAM 0.25MG TABLETS    Will file in chart as: ALPRAZolam (XANAX) 0.25 MG tablet   The source prescription was reordered on 11/26/2019 by Edenilson Adams MD.   Sig: TAKE 1/2 TO 1 TABLET BY MOUTH DAILY AS NEEDED FOR ANXIETY   Disp:  45 tablet    Refills:  0   Start: 11/25/2019   For: Generalized anxiety disorder, Panic attacks     This medication was reordered on 11/26/19 for 30 tablets, 0 refills.     Will refuse request at this time, duplicate request.     Alicja Varner RN on 11/29/2019 at 8:12 AM

## 2020-01-29 DIAGNOSIS — I49.3 PVC'S (PREMATURE VENTRICULAR CONTRACTIONS): Primary | ICD-10-CM

## 2020-03-02 ENCOUNTER — HEALTH MAINTENANCE LETTER (OUTPATIENT)
Age: 70
End: 2020-03-02

## 2020-03-10 ENCOUNTER — MYC MEDICAL ADVICE (OUTPATIENT)
Dept: INTERNAL MEDICINE | Facility: OTHER | Age: 70
End: 2020-03-10

## 2020-03-10 NOTE — TELEPHONE ENCOUNTER
Work as usual.     Recommendations are to continue with routine hygiene.      Cover your cough.  Wash your hands.    Do not touch things and then touch your face or eat without washing your hands.    Edenilson Adams MD

## 2020-03-12 DIAGNOSIS — R35.1 NOCTURIA: ICD-10-CM

## 2020-03-12 RX ORDER — TAMSULOSIN HYDROCHLORIDE 0.4 MG/1
0.8 CAPSULE ORAL EVERY EVENING
Qty: 180 CAPSULE | Refills: 0 | Status: SHIPPED | OUTPATIENT
Start: 2020-03-12 | End: 2020-10-23

## 2020-03-12 NOTE — TELEPHONE ENCOUNTER
Per Federico Marcial MD patient needs to be seen prior to further refills.  Patient notified of this and states that he will make an appointment around May.  He is aware if another script request comes through and no appointment has been made it will be refused.  Kaity Thornton RN......March 12, 2020...2:07 PM

## 2020-03-12 NOTE — TELEPHONE ENCOUNTER
"Pt.'s last office visit with Federico Macrial MD was on 8/30/2018 and note states:  \"Plan  Continue Flomax 0.4mg twice daily  Follow up in 1 year\"  To MD to address.  Kaity Thornton RN.........3/12/2020...2:03 PM   "

## 2020-03-16 DIAGNOSIS — I25.10 CORONARY ARTERY DISEASE DUE TO LIPID RICH PLAQUE: ICD-10-CM

## 2020-03-16 DIAGNOSIS — F41.0 PANIC ATTACKS: ICD-10-CM

## 2020-03-16 DIAGNOSIS — E78.2 MIXED HYPERLIPIDEMIA: ICD-10-CM

## 2020-03-16 DIAGNOSIS — I10 BENIGN ESSENTIAL HYPERTENSION: Primary | ICD-10-CM

## 2020-03-16 DIAGNOSIS — I25.83 CORONARY ARTERY DISEASE DUE TO LIPID RICH PLAQUE: ICD-10-CM

## 2020-03-17 ENCOUNTER — TELEPHONE (OUTPATIENT)
Dept: INTERNAL MEDICINE | Facility: OTHER | Age: 70
End: 2020-03-17

## 2020-03-17 RX ORDER — ROSUVASTATIN CALCIUM 40 MG/1
40 TABLET, COATED ORAL DAILY
Qty: 90 TABLET | Refills: 3 | Status: SHIPPED | OUTPATIENT
Start: 2020-03-17 | End: 2020-10-23

## 2020-03-17 RX ORDER — LOSARTAN POTASSIUM 25 MG/1
25 TABLET ORAL DAILY
Qty: 90 TABLET | Refills: 3 | Status: SHIPPED | OUTPATIENT
Start: 2020-03-17 | End: 2020-10-23

## 2020-03-17 NOTE — TELEPHONE ENCOUNTER
Patient is requesting a refill of Crestor, Losartan and Zoloft.  At last office visit patient advised to stop Losartan and to taper off Zoloft.  Spouse was Hospitalized around the time of the last visit and with high anxiety-he was taking this daily and it was effective.  Patient will consider a taper off this Spring/Summer-maybe.   BP was getting high when off it Losartan.  130/80, pulse up to 150 when on the treadmill.  Patient started 1/2 tablet (25 mg) of Losartan and over the past 3 months -104/58-75 (mainly 62) and resting pulse 59-61.      Orders pending.    Gardenia Barnard LPN 3/17/2020 4:14 PM

## 2020-03-17 NOTE — TELEPHONE ENCOUNTER
Continue to treat at home.   Push oral hydration. Prevent dehydration.   Okay to use OTC symptomatic medications, approved by his pharmacist...    -- so they don't interact with his other normal medications... to treat his symptoms.     Edenilson Adams MD

## 2020-03-17 NOTE — TELEPHONE ENCOUNTER
Sertraline 50 mg      Last Written Prescription Date:  11/26/19    Last Fill Quantity: 30,   # refills: 0  Last Office Visit: 11/26/19  Future Office visit:       Routing refill request to provider for review/approval because:  In your last note you suggested only using med for another month and to start tapering off.  Brenda J. Goodell, RN on 3/17/2020 at 3:53 PM

## 2020-03-17 NOTE — TELEPHONE ENCOUNTER
Pt is still waiting for his prescription for Losartan, Zoloft, he would like to speak with [facility] nurse when she has a moment. Thank you.   Nieves Berry on 3/17/2020 at 8:44 AM

## 2020-03-17 NOTE — TELEPHONE ENCOUNTER
Patient was called about refill requests and then patient express the following concerns.    Dry cough for 3 weeks, headaches for a couple of days 2 1/2 weeks ago, diarrhea and body aches for several days 2 weeks ago.  2 weeks ago, patient started to have 1 daily cough and with this one cough per day he will cough up white/clear sputum with some yellow present.  Oxygen saturation 96-98% room air.  NO fevers, NO traveling.    Patient is wondering what the next step will be since there is no fever.  He works in the [employer] at [employer], coworkers have been ill.  Spouse has had some of the same symptoms but no cough.      Gardenia Barnard LPN 3/17/2020 4:21 PM

## 2020-03-18 NOTE — TELEPHONE ENCOUNTER
Spoke with patient and relayed message below. Patient states that his job may call to verify that he should stay home until he feels better.    Pastora Garnett LPN on 3/18/2020 at 8:37 AM

## 2020-03-24 ENCOUNTER — MYC MEDICAL ADVICE (OUTPATIENT)
Dept: INTERNAL MEDICINE | Facility: OTHER | Age: 70
End: 2020-03-24

## 2020-03-24 NOTE — TELEPHONE ENCOUNTER
[facility] is not giving out work releases / work letters at this time.     Would go back up on the Sertraline to 75 or 100 mg daily, to help with his mood.     Edenilson Adams MD

## 2020-03-25 ENCOUNTER — MYC MEDICAL ADVICE (OUTPATIENT)
Dept: INTERNAL MEDICINE | Facility: OTHER | Age: 70
End: 2020-03-25

## 2020-03-25 NOTE — TELEPHONE ENCOUNTER
Once patient's symptoms have resolved, okay to go back to work.     Can fax this note to his employer.     Edenilson Adams MD on 3/25/2020 at 10:37 AM

## 2020-05-14 ENCOUNTER — TELEPHONE (OUTPATIENT)
Dept: INTERNAL MEDICINE | Facility: OTHER | Age: 70
End: 2020-05-14

## 2020-05-14 NOTE — TELEPHONE ENCOUNTER
Patient name and date of birth verified. After verification, patient stated he needed a 's signature for a lay-off at work for being in a high-risk group in regards to COVID.   Stated he just needed a report from doctor confirming that he has COPD and chronic heart disease.   I informed pt that this may not be able to be done, and he understood.   Please advise.   Dalia Self LPN 5/14/2020 11:51 AM

## 2020-05-14 NOTE — LETTER
May 14, 2020      Héctor Thornton  [address] [place] [id number]        To Whom It May Concern:    Héctor Thornton has high risk medical conditions which include: Hypertension, Obesity, COPD and Coronary artery disease.   This puts him at greater risk of severe illness if he were to get infected with COVID-19.       Sincerely,        Edenilson Adams MD

## 2020-05-14 NOTE — TELEPHONE ENCOUNTER
Héctor called and has a form that needs to be filled out and wanted to go over the questions on it.

## 2020-07-31 DIAGNOSIS — I49.3 PVC'S (PREMATURE VENTRICULAR CONTRACTIONS): ICD-10-CM

## 2020-07-31 DIAGNOSIS — I10 BENIGN ESSENTIAL HYPERTENSION: ICD-10-CM

## 2020-07-31 DIAGNOSIS — E78.2 MIXED HYPERLIPIDEMIA: ICD-10-CM

## 2020-07-31 DIAGNOSIS — R73.9 ELEVATED RANDOM BLOOD GLUCOSE LEVEL: ICD-10-CM

## 2020-07-31 LAB
ALBUMIN SERPL-MCNC: 4.3 G/DL (ref 3.5–5.7)
ALP SERPL-CCNC: 37 U/L (ref 34–104)
ALT SERPL W P-5'-P-CCNC: 9 U/L (ref 7–52)
ANION GAP SERPL CALCULATED.3IONS-SCNC: 6 MMOL/L (ref 3–14)
AST SERPL W P-5'-P-CCNC: 15 U/L (ref 13–39)
BILIRUB SERPL-MCNC: 0.6 MG/DL (ref 0.3–1)
BUN SERPL-MCNC: 19 MG/DL (ref 7–25)
CALCIUM SERPL-MCNC: 9.3 MG/DL (ref 8.6–10.3)
CHLORIDE SERPL-SCNC: 98 MMOL/L (ref 98–107)
CHOLEST SERPL-MCNC: 264 MG/DL
CO2 SERPL-SCNC: 30 MMOL/L (ref 21–31)
CREAT SERPL-MCNC: 1.07 MG/DL (ref 0.7–1.3)
ERYTHROCYTE [DISTWIDTH] IN BLOOD BY AUTOMATED COUNT: 12.4 % (ref 10–15)
GFR SERPL CREATININE-BSD FRML MDRD: 69 ML/MIN/{1.73_M2}
GLUCOSE SERPL-MCNC: 96 MG/DL (ref 70–105)
HBA1C MFR BLD: 5.5 % (ref 4–6)
HCT VFR BLD AUTO: 47.5 % (ref 40–53)
HDLC SERPL-MCNC: 51 MG/DL (ref 23–92)
HGB BLD-MCNC: 16.3 G/DL (ref 13.3–17.7)
LDLC SERPL CALC-MCNC: 186 MG/DL
MAGNESIUM SERPL-MCNC: 2.1 MG/DL (ref 1.9–2.7)
MCH RBC QN AUTO: 32.3 PG (ref 26.5–33)
MCHC RBC AUTO-ENTMCNC: 34.3 G/DL (ref 31.5–36.5)
MCV RBC AUTO: 94 FL (ref 78–100)
NONHDLC SERPL-MCNC: 213 MG/DL
PLATELET # BLD AUTO: 284 10E9/L (ref 150–450)
POTASSIUM SERPL-SCNC: 4.5 MMOL/L (ref 3.5–5.1)
PROT SERPL-MCNC: 6.9 G/DL (ref 6.4–8.9)
RBC # BLD AUTO: 5.04 10E12/L (ref 4.4–5.9)
SODIUM SERPL-SCNC: 134 MMOL/L (ref 134–144)
TRIGL SERPL-MCNC: 134 MG/DL
WBC # BLD AUTO: 6.6 10E9/L (ref 4–11)

## 2020-07-31 PROCEDURE — 36415 COLL VENOUS BLD VENIPUNCTURE: CPT | Mod: ZL

## 2020-07-31 PROCEDURE — 80053 COMPREHEN METABOLIC PANEL: CPT | Mod: ZL

## 2020-07-31 PROCEDURE — 80061 LIPID PANEL: CPT | Mod: ZL

## 2020-07-31 PROCEDURE — 85027 COMPLETE CBC AUTOMATED: CPT | Mod: ZL

## 2020-07-31 PROCEDURE — 83036 HEMOGLOBIN GLYCOSYLATED A1C: CPT | Mod: ZL

## 2020-07-31 PROCEDURE — 83735 ASSAY OF MAGNESIUM: CPT | Mod: ZL

## 2020-07-31 PROCEDURE — 81003 URINALYSIS AUTO W/O SCOPE: CPT | Mod: ZL | Performed by: INTERNAL MEDICINE

## 2020-09-21 ENCOUNTER — TELEPHONE (OUTPATIENT)
Dept: INTERNAL MEDICINE | Facility: OTHER | Age: 70
End: 2020-09-21

## 2020-09-25 DIAGNOSIS — F41.1 GENERALIZED ANXIETY DISORDER: ICD-10-CM

## 2020-09-25 DIAGNOSIS — F41.0 PANIC ATTACKS: ICD-10-CM

## 2020-09-28 NOTE — TELEPHONE ENCOUNTER
Requested Prescriptions   Pending Prescriptions Disp Refills     ALPRAZolam (XANAX) 0.25 MG tablet [Pharmacy Med Name: ALPRAZOLAM 0.25MG TABLETS] 30 tablet      Sig: TAKE 1/2 TO 1 TABLET(0.125 TO 0.25 MG) BY MOUTH DAILY AS NEEDED FOR ANXIETY       There is no refill protocol information for this order            Last Written Prescription Date:  11/26/2019  Last Fill Quantity: 30,   # refills: 0  Last Office Visit: 11/26/2019 with Edenilson Adams MD  Future Office visit:    Next 5 appointments (look out 90 days)    Oct 23, 2020 10:00 AM CDT  PHYSICAL with Edenilson Adams MD  [facility] ([facility]) [address]  [place] [id number] [phone]      Unable to complete prescription refill per RN medication refill policy. Will route to provider for review and consideration.  Ashley Washington RN on 9/28/2020 at 3:09 PM

## 2020-09-29 RX ORDER — ALPRAZOLAM 0.25 MG
TABLET ORAL
Qty: 30 TABLET | Refills: 0 | Status: SHIPPED | OUTPATIENT
Start: 2020-09-29 | End: 2020-10-23

## 2020-10-23 ENCOUNTER — OFFICE VISIT (OUTPATIENT)
Dept: INTERNAL MEDICINE | Facility: OTHER | Age: 70
End: 2020-10-23
Attending: INTERNAL MEDICINE
Payer: COMMERCIAL

## 2020-10-23 VITALS
WEIGHT: 226.2 LBS | BODY MASS INDEX: 33.5 KG/M2 | RESPIRATION RATE: 16 BRPM | HEIGHT: 69 IN | DIASTOLIC BLOOD PRESSURE: 62 MMHG | HEART RATE: 68 BPM | TEMPERATURE: 97.8 F | OXYGEN SATURATION: 95 % | SYSTOLIC BLOOD PRESSURE: 110 MMHG

## 2020-10-23 DIAGNOSIS — R12 HEARTBURN: ICD-10-CM

## 2020-10-23 DIAGNOSIS — F41.0 PANIC ATTACKS: ICD-10-CM

## 2020-10-23 DIAGNOSIS — J98.01 BRONCHOSPASM: ICD-10-CM

## 2020-10-23 DIAGNOSIS — R20.2 NUMBNESS AND TINGLING OF BOTH FEET: ICD-10-CM

## 2020-10-23 DIAGNOSIS — Z13.6 SCREENING FOR AAA (ABDOMINAL AORTIC ANEURYSM): ICD-10-CM

## 2020-10-23 DIAGNOSIS — I50.32 CHRONIC DIASTOLIC HEART FAILURE (H): ICD-10-CM

## 2020-10-23 DIAGNOSIS — I25.83 CORONARY ARTERY DISEASE DUE TO LIPID RICH PLAQUE: ICD-10-CM

## 2020-10-23 DIAGNOSIS — R35.1 NOCTURIA: ICD-10-CM

## 2020-10-23 DIAGNOSIS — I25.10 CORONARY ARTERY DISEASE DUE TO LIPID RICH PLAQUE: ICD-10-CM

## 2020-10-23 DIAGNOSIS — E78.49 FAMILIAL HYPERLIPIDEMIA: ICD-10-CM

## 2020-10-23 DIAGNOSIS — Z00.00 ENCOUNTER FOR MEDICARE ANNUAL WELLNESS EXAM: ICD-10-CM

## 2020-10-23 DIAGNOSIS — R20.0 NUMBNESS AND TINGLING OF BOTH FEET: ICD-10-CM

## 2020-10-23 DIAGNOSIS — G56.20 ULNAR NEUROPATHY AT ELBOW, UNSPECIFIED LATERALITY: Primary | ICD-10-CM

## 2020-10-23 DIAGNOSIS — I25.9 ISCHEMIC HEART DISEASE, CHRONIC: ICD-10-CM

## 2020-10-23 DIAGNOSIS — I10 BENIGN ESSENTIAL HYPERTENSION: ICD-10-CM

## 2020-10-23 DIAGNOSIS — F41.1 GENERALIZED ANXIETY DISORDER: ICD-10-CM

## 2020-10-23 PROBLEM — Z87.898 HISTORY OF PREDIABETES: Status: RESOLVED | Noted: 2017-11-22 | Resolved: 2020-10-23

## 2020-10-23 PROCEDURE — G0438 PPPS, INITIAL VISIT: HCPCS | Performed by: INTERNAL MEDICINE

## 2020-10-23 PROCEDURE — 99214 OFFICE O/P EST MOD 30 MIN: CPT | Mod: 25 | Performed by: INTERNAL MEDICINE

## 2020-10-23 PROCEDURE — G0463 HOSPITAL OUTPT CLINIC VISIT: HCPCS

## 2020-10-23 RX ORDER — LOSARTAN POTASSIUM 25 MG/1
25 TABLET ORAL DAILY
Qty: 90 TABLET | Refills: 3 | Status: SHIPPED | OUTPATIENT
Start: 2020-10-23 | End: 2021-10-11

## 2020-10-23 RX ORDER — ALBUTEROL SULFATE 90 UG/1
AEROSOL, METERED RESPIRATORY (INHALATION)
Qty: 18 G | Refills: 11 | Status: SHIPPED | OUTPATIENT
Start: 2020-10-23 | End: 2021-10-28

## 2020-10-23 RX ORDER — CLOPIDOGREL BISULFATE 75 MG/1
75 TABLET ORAL DAILY
Qty: 90 TABLET | Refills: 3 | Status: SHIPPED | OUTPATIENT
Start: 2020-10-23 | End: 2021-10-11

## 2020-10-23 RX ORDER — TAMSULOSIN HYDROCHLORIDE 0.4 MG/1
0.8 CAPSULE ORAL EVERY EVENING
Qty: 180 CAPSULE | Refills: 3 | Status: SHIPPED | OUTPATIENT
Start: 2020-10-23 | End: 2021-10-28

## 2020-10-23 RX ORDER — ALPRAZOLAM 0.25 MG
TABLET ORAL
Qty: 90 TABLET | Refills: 3 | Status: SHIPPED | OUTPATIENT
Start: 2020-10-23 | End: 2021-10-28

## 2020-10-23 RX ORDER — ROSUVASTATIN CALCIUM 40 MG/1
20 TABLET, COATED ORAL DAILY
Qty: 90 TABLET | Refills: 3 | Status: SHIPPED | OUTPATIENT
Start: 2020-10-23 | End: 2021-10-28

## 2020-10-23 RX ORDER — LANSOPRAZOLE 30 MG/1
30 CAPSULE, DELAYED RELEASE ORAL DAILY PRN
Qty: 90 CAPSULE | Refills: 3 | Status: SHIPPED | OUTPATIENT
Start: 2020-10-23 | End: 2021-10-28

## 2020-10-23 ASSESSMENT — PAIN SCALES - GENERAL: PAINLEVEL: MODERATE PAIN (5)

## 2020-10-23 ASSESSMENT — ENCOUNTER SYMPTOMS
FEVER: 0
CONFUSION: 0
SHORTNESS OF BREATH: 0
DYSURIA: 0
CHILLS: 0
HEMATURIA: 0
ARTHRALGIAS: 0
COUGH: 0
SLEEP DISTURBANCE: 1
WHEEZING: 0
AGITATION: 0
BRUISES/BLEEDS EASILY: 0
PALPITATIONS: 0
NAUSEA: 0
NERVOUS/ANXIOUS: 1
NECK PAIN: 1
MYALGIAS: 0
LIGHT-HEADEDNESS: 1
ABDOMINAL PAIN: 0
DIARRHEA: 0
DIZZINESS: 0
VOMITING: 0
WOUND: 0

## 2020-10-23 ASSESSMENT — MIFFLIN-ST. JEOR: SCORE: 1778.48

## 2020-10-23 ASSESSMENT — PATIENT HEALTH QUESTIONNAIRE - PHQ9: SUM OF ALL RESPONSES TO PHQ QUESTIONS 1-9: 15

## 2020-10-23 NOTE — NURSING NOTE
Chief Complaint   Patient presents with     Medicare Visit   Patient presents to the clinic today for an annual visit     Medication Reconciliation: completed   Verena Garcia LPN  10/23/2020 9:53 AM

## 2020-10-23 NOTE — PROGRESS NOTES
"SUBJECTIVE:   Héctor Thornton is a 70 year old male who presents for Preventive Visit.  Patient has been advised of split billing requirements and indicates understanding: Yes  Are you in the first 12 months of your Medicare Part B coverage?  No    Physical Health:    In general, how would you rate your overall physical health? good    Outside of work, how many days during the week do you exercise? none    Outside of work, approximately how many minutes a day do you exercise?not applicable    If you drink alcohol do you typically have >3 drinks per day or >7 drinks per week? No    Do you usually eat at least 4 servings of fruit and vegetables a day, include whole grains & fiber and avoid regularly eating high fat or \"junk\" foods? Yes    Do you have any problems taking medications regularly?  No    Do you have any side effects from medications? none    Needs assistance for the following daily activities: no assistance needed    Which of the following safety concerns are present in your home?  none identified     Hearing impairment: No    In the past 6 months, have you been bothered by leaking of urine? yes    Mental Health:    In general, how would you rate your overall mental or emotional health? fair  PHQ-2 Score: (!) 6    Do you feel safe in your environment? Yes    Have you ever done Advance Care Planning? (For example, a Health Directive, POLST, or a discussion with a medical provider or your loved ones about your wishes): No, advance care planning information given to patient to review.  Patient declined advance care planning discussion at this time.    Additional concerns to address?      Fall risk:  Fallen 2 or more times in the past year?: No  Any fall with injury in the past year?: No    Cognitive Screenin) Repeat 3 items (Leader, Season, Table)    2) Clock draw:  NORMAL  3) 3 item recall: Recalls 1 object   Results: NORMAL clock, 1-2 items recalled: COGNITIVE IMPAIRMENT LESS LIKELY    Mini-CogTM " Copyright BIBI Rajan. Licensed by the author for use in [facility]; reprinted with permission ([email or web address]). All rights reserved.      Do you have sleep apnea, excessive snoring or daytime drowsiness?: no    Reviewed and updated as needed this visit by clinical staff  Tobacco  Allergies  Meds  Problems  Med Hx  Surg Hx  Fam Hx          Reviewed and updated as needed this visit by Provider  Tobacco  Allergies  Meds  Problems  Med Hx  Surg Hx  Fam Hx         Social History     Tobacco Use     Smoking status: Former Smoker     Packs/day: 0.00     Years: 45.00     Pack years: 0.00     Types: Pipe     Quit date: 2000     Years since quittin.8     Smokeless tobacco: Current User     Types: Chew     Last attempt to quit: 1979     Tobacco comment: Quit smoking: -- No longer using cigarettes, occasionally smokes a pipe   Substance Use Topics     Alcohol use: No     Alcohol/week: 0.0 standard drinks     Frequency: Never                           Current providers sharing in care for this patient include:   Patient Care Team:  Edenilson Adams MD as PCP - General (Internal Medicine)  Edenilson Adams MD as Assigned PCP    The following health maintenance items are reviewed in Epic and correct as of today:  Health Maintenance   Topic Date Due     ZOSTER IMMUNIZATION (1 of 2) 10/18/2002     AORTIC ANEURYSM SCREENING (SYSTEM ASSIGNED)  10/18/2017     FALL RISK ASSESSMENT  2020     COLORECTAL CANCER SCREENING  2021     PHQ-9  2021     MEDICARE ANNUAL WELLNESS VISIT  10/23/2021     DTAP/TDAP/TD IMMUNIZATION (2 - Td) 2023     LIPID  2025     ADVANCE CARE PLANNING  10/23/2025     INFLUENZA VACCINE  Completed     Pneumococcal Vaccine: 65+ Years  Completed     HEPATITIS C SCREENING  Addressed     DEPRESSION ACTION PLAN  Addressed     Pneumococcal Vaccine: Pediatrics (0 to 5 Years) and At-Risk Patients (6 to 64 Years)  Aged Out     IPV IMMUNIZATION  Aged Out      "MENINGITIS IMMUNIZATION  Aged Out     HEPATITIS B IMMUNIZATION  Aged Out   Review of Systems   Constitutional: Negative for chills and fever.   HENT: Negative for congestion and hearing loss.    Eyes: Negative for visual disturbance.   Respiratory: Negative for cough, shortness of breath and wheezing.    Cardiovascular: Positive for leg swelling. Negative for chest pain and palpitations.   Gastrointestinal: Negative for abdominal pain, diarrhea, nausea and vomiting.   Endocrine: Negative for cold intolerance and heat intolerance.   Genitourinary: Negative for dysuria and hematuria.   Musculoskeletal: Positive for neck pain. Negative for arthralgias and myalgias.        + restless legs   Skin: Negative for rash and wound.   Allergic/Immunologic: Negative for immunocompromised state.   Neurological: Positive for light-headedness. Negative for dizziness.   Hematological: Does not bruise/bleed easily.   Psychiatric/Behavioral: Positive for sleep disturbance. Negative for agitation and confusion. The patient is nervous/anxious.         OBJECTIVE:   /62 (BP Location: Left arm, Patient Position: Sitting, Cuff Size: Adult Large)   Pulse 68   Temp 97.8  F (36.6  C) (Tympanic)   Resp 16   Ht 1.74 m (5' 8.5\")   Wt 102.6 kg (226 lb 3.2 oz)   SpO2 95%   BMI 33.89 kg/m   Estimated body mass index is 33.89 kg/m  as calculated from the following:    Height as of this encounter: 1.74 m (5' 8.5\").    Weight as of this encounter: 102.6 kg (226 lb 3.2 oz).  Physical Exam  Constitutional:       General: He is not in acute distress.     Appearance: He is well-developed. He is not diaphoretic.   HENT:      Head: Normocephalic and atraumatic.   Eyes:      General: No scleral icterus.     Conjunctiva/sclera: Conjunctivae normal.   Neck:      Musculoskeletal: Neck supple.      Vascular: No carotid bruit.   Cardiovascular:      Rate and Rhythm: Normal rate and regular rhythm.      Pulses: Normal pulses.   Pulmonary:      Effort: " Pulmonary effort is normal.      Breath sounds: Normal breath sounds.   Abdominal:      General: Bowel sounds are normal.      Palpations: Abdomen is soft.      Tenderness: There is no abdominal tenderness.   Musculoskeletal: Normal range of motion.         General: Tenderness (  + numerous trigger points with improved tenderness to palpation in mid, upper back and shoulder/neck areas) present. No deformity.      Right lower leg: Edema (trace) present.      Left lower leg: Edema (trace) present.   Lymphadenopathy:      Cervical: No cervical adenopathy.   Skin:     General: Skin is warm and dry.      Findings: No rash.   Neurological:      Mental Status: He is alert and oriented to person, place, and time. Mental status is at baseline.   Psychiatric:         Mood and Affect: Mood normal.         Behavior: Behavior normal.        Diagnostic Test Results:  Labs reviewed in Epic    ASSESSMENT / PLAN:       ICD-10-CM    1. Ulnar neuropathy at elbow, unspecified laterality - Bilaterally  G56.20    2. Bronchospasm  J98.01 albuterol (PROAIR HFA/PROVENTIL HFA/VENTOLIN HFA) 108 (90 Base) MCG/ACT inhaler   3. Nocturia  R35.1 tamsulosin (FLOMAX) 0.4 MG capsule   4. Familial hyperlipidemia  E78.49 rosuvastatin (CRESTOR) 40 MG tablet   5. Coronary artery disease due to lipid rich plaque  I25.10 rosuvastatin (CRESTOR) 40 MG tablet    I25.83 clopidogrel (PLAVIX) 75 MG tablet   6. Encounter for Medicare annual wellness exam  Z00.00    7. Benign essential hypertension  I10 losartan (COZAAR) 25 MG tablet   8. Ischemic heart disease, chronic  I25.9    9. Chronic diastolic heart failure (H)  I50.32    10. Heartburn  R12 LANsoprazole (PREVACID) 30 MG DR capsule   11. Numbness and tingling of both feet  R20.0     R20.2    12. Screening for AAA (abdominal aortic aneurysm)  Z13.6 US Aorta Medicare AAA Screening   13. Generalized anxiety disorder  F41.1 ALPRAZolam (XANAX) 0.25 MG tablet   14. Panic attacks  F41.0 ALPRAZolam (XANAX) 0.25 MG  tablet     sertraline (ZOLOFT) 50 MG tablet   Patient presents for annual Medicare wellness visit as well as follow-up multiple issues.    Having some numbness in both hands, specifically the fourth and fifth fingers.  States that he does rest his arms on counters and surfaces frequently as well as sleeping with his elbows bent.  He has ulnar neuropathy on exam.  We discussed some home care options.  Paperwork printed and reviewed.  He was encouraged to try reducing the pressure on his elbows when bent and to try sleeping with his arm straight.    Bronchospasm, ongoing.  Continues to use albuterol inhaler intermittently.  Needs refills.    Nocturia, BPH,  symptoms are somewhat improved with Flomax twice daily.  Needs refills.  He is not at the point of needing prostate surgery.    Coronary artery disease with familial hyperlipidemia.  Continues on Crestor 40 mg daily.  Despite his dosing of 40 mg of Crestor, his LDL cholesterol remains quite elevated with last lab work.  Otherwise seems to be tolerating medication well.  Continue current dosing.  Does have some easy bruising and bleeding.  Continues on Plavix daily for his heart disease. Needs refills.    Hypertension, blood pressures are well controlled.  Doing well with current medication regimen.  No syncope or presyncope.  Continue losartan.  Needs refills.    Chronic ischemic heart disease, see above.  Continue Crestor and Plavix.    Chronic diastolic heart failure, controlled.  No significant fluid retention issues.    Heartburn, persistent.  Prevacid does help.  Needs refills.    Bilateral feet with numbness and tingling.  Possibly due to elevated random glucose.  Discussed lab work, he declines at this time but would like to check later on.  To help with weight loss and improve blood sugar control....  --Encouraged reduced oral carbohydrate intake, weight loss.    Abdominal aortic aneurysm ultrasound screening is due, orders placed.    Continues to have  "issues with panic attacks and generalized anxiety.  Currently on Zoloft and Xanax.  Both of these have been quite helpful.  Needs refills.  His wife was very sick last fall.  She has been 28 days in the hospital, 8 of them in the ICU.  With the virus pandemic he ended up getting forced into detention and retired in August.  He continues to utilize Xanax half to 1 tablet daily.  No opioid use.  Proper medication use and misuse reviewed.  Patient has been using medications appropriately.  Prescription refilled x3 months with 3 refills.    Patient has been advised of split billing requirements and indicates understanding: Yes    COUNSELING:  Reviewed preventive health counseling, as reflected in patient instructions  Special attention given to:       Consider AAA screening for ages 65-75 and smoking history       Regular exercise       Healthy diet/nutrition       Vision screening       Hearing screening       Dental care       Bladder control       Fall risk prevention       Immunizations    Estimated body mass index is 33.89 kg/m  as calculated from the following:    Height as of this encounter: 1.74 m (5' 8.5\").    Weight as of this encounter: 102.6 kg (226 lb 3.2 oz).    Weight management plan: Discussed healthy diet and exercise guidelines    He reports that he quit smoking about 20 years ago. His smoking use included pipe. He smoked 0.00 packs per day for 45.00 years. His smokeless tobacco use includes chew.    Appropriate preventive services were discussed with this patient, including applicable screening as appropriate for cardiovascular disease, diabetes, osteopenia/osteoporosis, and glaucoma.  As appropriate for age/gender, discussed screening for colorectal cancer, prostate cancer, breast cancer, and cervical cancer. Checklist reviewing preventive services available has been given to the patient.    Reviewed patients plan of care and provided an AVS. The Complex Care Plan (for patients with higher acuity " and needing more deliberate coordination of services) for Héctor meets the Care Plan requirement. This Care Plan has been established and reviewed with the Patient.    Counseling Resources:  ATP IV Guidelines  Pooled Cohorts Equation Calculator  Breast Cancer Risk Calculator  BRCA-Related Cancer Risk Assessment: FHS-7 Tool  FRAX Risk Assessment  ICSI Preventive Guidelines  Dietary Guidelines for Americans, 2010  USDA's MyPlate  ASA Prophylaxis  Lung CA Screening    Edenilson Adams MD  [facility]      The patient s PHQ-9 score is consistent with moderate depression. He was provided with information regarding depression and was advised to schedule a follow up appointment in 4-6 weeks to further address this issue.

## 2020-10-23 NOTE — PATIENT INSTRUCTIONS
See printout on ulnar neuropathy.   -- try to avoid resting on elbows.   -- try sleeping with arms straight.    Medications refilled.   Blood pressure is controlled.     abdominal aortic aneurysm ultrasound screening ordered  - they will call with date/time of appointment.      Immunization History   Administered Date(s) Administered     Flu, Unspecified 11/23/2009, 12/09/2010, 10/05/2017     Influenza (High Dose) 3 valent vaccine 09/08/2018, 09/24/2019, 09/17/2020     Influenza (IIV3) PF 10/24/2011, 09/24/2012, 10/08/2013, 10/09/2013     Influenza Vaccine IM > 6 months Valent IIV4 10/20/2014, 10/23/2015, 10/12/2016     Influenza, Whole Virus 12/09/2010     Pneumo Conj 13-V (2010&after) 11/22/2017     Pneumococcal 23 valent 03/23/2010, 11/26/2019     TDAP Vaccine (Boostrix) 12/11/2013      -- Get your tetanus shot updated - from one of the local pharmacies, at your convenience. -- in 2023.    -- Get the new shingles shot (2 in series) (Shingrix) - from one of the local pharmacies, at your convenience.     Pneumococcal Pneumonia vaccines (PCV 13 and PCV 23) -- These are completed.         Patient Education   Personalized Prevention Plan  You are due for the preventive services outlined below.  Your care team is available to assist you in scheduling these services.  If you have already completed any of these items, please share that information with your care team to update in your medical record.  Health Maintenance Due   Topic Date Due     Zoster (Shingles) Vaccine (1 of 2) 10/18/2002     AORTIC ANEURYSM SCREENING (SYSTEM ASSIGNED)  10/18/2017       Depression and Suicide in Older Adults    Nearly 2 million older Americans have some type of depression. Sadly, some of them even take their own lives. Yet depression among older adults is often ignored. Learn the warning signs. You may help spare a loved one needless pain. You may also save a life.  What is depression?  Depression is a serious illness that affects the  way you think and feel. It is not a normal part of aging, nor is it a sign of weakness, a character flaw, or something you can snap out of. Most people with depression need treatment to get better. The most common symptom is a feeling of deep sadness. People who are depressed also may seem tired and listless. And nothing seems to give them pleasure. It s normal to grieve or be sad sometimes. But sadness lessens or passes with time. Depression rarely goes away or improves on its own. Other symptoms of depression are:    Sleeping more or less than normal    Eating more or less than normal    Having headaches, stomachaches, or other pains that don t go away    Feeling nervous,  empty,  or worthless    Crying a great deal    Thinking or talking about suicide or death    Feeling confused or forgetful  What causes it?  The causes of depression aren t fully known. But, it is thought to result from a complex interaction of biochemistry, genetics, environmental factors, and personality. Certain chemicals in the brain play a role. Depression does run in families. And life stresses can also trigger depression in some people. Older adults often face many stressors, such as death of friends or a spouse, health problems, and financial concerns.  How you can help  Often, depressed people may not want to ask for help. When they do, they may be ignored. Or, they may receive the wrong treatment. You can help by showing parents and older friends love and support. If they seem depressed, help them find the right treatment. Talk to your doctor. Or contact a local mental health center, social service agency, or hospital. With modern treatment, no one has to suffer from depression.  If your older friend or family member agrees, you can be an advocate for him or her in the healthcare setting. Many times, older adults have other chronic illnesses such as diabetes, heart disease, or cancer that can cause symptoms of depression. Medicine side  effects can also contribute to certain behaviors and feelings. It is important that the older adult's healthcare provider listens and sorts out the causes of any symptoms of depression and makes referrals to mental health specialists when needed. Untreated depression can result in misdiagnosis, including brain disorders such as dementia and Alzheimer's. If the health professional does not take the issue of depression seriously, ask your family member or friend to consider finding another provider.  Resources    National Suicide Prevention Lifeline (crisis hotline)617-263-ICJB (5155)    National [place] of Mental Eryoaw819-044-3243sgz.[facility].nih.gov    National [place] on Mental Obotjyi224-214-4334aql.[place].org    Mental Health Sdzjbyq909-640-3875gpg.[facility].org    National Suicide Kaxdigp516-158-2828 (800-SUICIDE)   Date Last Reviewed: 2/1/2017 2000-2019 [employer]. [address] [place] [address]. All rights reserved. This information is not intended as a substitute for professional medical care. Always follow your healthcare professional's instructions.            Return in approximately 1 year, or sooner as needed for follow-up with Dr. Adams.  - Annual Follow-up / Physical - Medicare Annual Wellness Visit     Clinic : [phone]  Appointment line: [phone]

## 2021-08-18 ENCOUNTER — MYC MEDICAL ADVICE (OUTPATIENT)
Dept: INTERNAL MEDICINE | Facility: OTHER | Age: 71
End: 2021-08-18

## 2021-08-18 DIAGNOSIS — R79.0 LOW BLOOD MAGNESIUM: ICD-10-CM

## 2021-08-18 DIAGNOSIS — I10 BENIGN ESSENTIAL HYPERTENSION: ICD-10-CM

## 2021-08-18 DIAGNOSIS — R39.12 WEAK URINE STREAM: Primary | ICD-10-CM

## 2021-08-18 DIAGNOSIS — R73.9 ELEVATED RANDOM BLOOD GLUCOSE LEVEL: ICD-10-CM

## 2021-08-18 DIAGNOSIS — F41.0 PANIC ATTACKS: ICD-10-CM

## 2021-08-18 DIAGNOSIS — E78.49 FAMILIAL HYPERLIPIDEMIA: ICD-10-CM

## 2021-08-20 NOTE — TELEPHONE ENCOUNTER
Sertraline dose was increased back to 2 tablets per day...     Please Schedule lab only appointment prior to his clinic appointment with me.     Edenilson Adams MD

## 2021-08-20 NOTE — TELEPHONE ENCOUNTER
Patient was contacted and Dr. Adams' response was given.  An appointment was made for labs prior to his October appointment.  Keira De Jesus on 8/20/2021 at 2:21 PM

## 2021-09-01 DIAGNOSIS — R00.2 PALPITATIONS: Primary | ICD-10-CM

## 2021-10-03 ENCOUNTER — HEALTH MAINTENANCE LETTER (OUTPATIENT)
Age: 71
End: 2021-10-03

## 2021-10-12 DIAGNOSIS — I10 BENIGN ESSENTIAL HYPERTENSION: ICD-10-CM

## 2021-10-12 DIAGNOSIS — I25.10 CORONARY ARTERY DISEASE DUE TO LIPID RICH PLAQUE: ICD-10-CM

## 2021-10-12 DIAGNOSIS — I25.83 CORONARY ARTERY DISEASE DUE TO LIPID RICH PLAQUE: ICD-10-CM

## 2021-10-14 RX ORDER — LOSARTAN POTASSIUM 25 MG/1
TABLET ORAL
Qty: 90 TABLET | OUTPATIENT
Start: 2021-10-14

## 2021-10-14 RX ORDER — CLOPIDOGREL BISULFATE 75 MG/1
TABLET ORAL
Qty: 90 TABLET | OUTPATIENT
Start: 2021-10-14

## 2021-10-14 NOTE — TELEPHONE ENCOUNTER
clopidogrel (PLAVIX) 75 MG tablet 30 tablet 0 10/12/2021  No   Sig: TAKE 1 TABLET(75 MG) BY MOUTH DAILY     losartan (COZAAR) 25 MG tablet 30 tablet 0 10/12/2021  No   Sig: TAKE 1 TABLET(25 MG) BY MOUTH DAILY     To bernardo TEMPLE    Refill request not appropriate  Alexandra Jiménez RN on 10/14/2021 at 2:40 PM

## 2021-10-27 RX ORDER — FUROSEMIDE 20 MG
10-20 TABLET ORAL EVERY MORNING
Status: CANCELLED | OUTPATIENT
Start: 2021-10-27

## 2021-10-27 RX ORDER — SUCRALFATE 1 G/1
1 TABLET ORAL
Status: CANCELLED | OUTPATIENT
Start: 2021-10-27

## 2021-10-27 RX ORDER — MUPIROCIN CALCIUM 20 MG/G
CREAM TOPICAL 3 TIMES DAILY
Qty: 15 G | Refills: 0 | Status: CANCELLED | OUTPATIENT
Start: 2021-10-27

## 2021-10-27 RX ORDER — LANSOPRAZOLE 30 MG/1
30 CAPSULE, DELAYED RELEASE ORAL DAILY PRN
Qty: 90 CAPSULE | Refills: 3 | Status: CANCELLED | OUTPATIENT
Start: 2021-10-27

## 2021-10-28 ENCOUNTER — OFFICE VISIT (OUTPATIENT)
Dept: INTERNAL MEDICINE | Facility: OTHER | Age: 71
End: 2021-10-28
Attending: INTERNAL MEDICINE
Payer: MEDICARE

## 2021-10-28 ENCOUNTER — LAB (OUTPATIENT)
Dept: LAB | Facility: OTHER | Age: 71
End: 2021-10-28
Attending: INTERNAL MEDICINE
Payer: COMMERCIAL

## 2021-10-28 VITALS
HEIGHT: 69 IN | SYSTOLIC BLOOD PRESSURE: 118 MMHG | DIASTOLIC BLOOD PRESSURE: 78 MMHG | WEIGHT: 244.6 LBS | RESPIRATION RATE: 16 BRPM | OXYGEN SATURATION: 95 % | TEMPERATURE: 97.4 F | HEART RATE: 70 BPM | BODY MASS INDEX: 36.23 KG/M2

## 2021-10-28 DIAGNOSIS — F41.0 PANIC ATTACKS: ICD-10-CM

## 2021-10-28 DIAGNOSIS — R35.1 NOCTURIA: ICD-10-CM

## 2021-10-28 DIAGNOSIS — J98.01 BRONCHOSPASM: ICD-10-CM

## 2021-10-28 DIAGNOSIS — R39.12 WEAK URINE STREAM: ICD-10-CM

## 2021-10-28 DIAGNOSIS — R12 HEARTBURN: ICD-10-CM

## 2021-10-28 DIAGNOSIS — E66.01 MORBID OBESITY (H): ICD-10-CM

## 2021-10-28 DIAGNOSIS — Z00.00 ENCOUNTER FOR MEDICARE ANNUAL WELLNESS EXAM: ICD-10-CM

## 2021-10-28 DIAGNOSIS — I10 BENIGN ESSENTIAL HYPERTENSION: Primary | ICD-10-CM

## 2021-10-28 DIAGNOSIS — N52.9 ERECTILE DYSFUNCTION, UNSPECIFIED ERECTILE DYSFUNCTION TYPE: ICD-10-CM

## 2021-10-28 DIAGNOSIS — I50.32 CHRONIC DIASTOLIC HEART FAILURE (H): ICD-10-CM

## 2021-10-28 DIAGNOSIS — I10 BENIGN ESSENTIAL HYPERTENSION: ICD-10-CM

## 2021-10-28 DIAGNOSIS — E78.49 FAMILIAL HYPERLIPIDEMIA: ICD-10-CM

## 2021-10-28 DIAGNOSIS — I25.83 CORONARY ARTERY DISEASE DUE TO LIPID RICH PLAQUE: ICD-10-CM

## 2021-10-28 DIAGNOSIS — R79.0 LOW BLOOD MAGNESIUM: ICD-10-CM

## 2021-10-28 DIAGNOSIS — R73.9 ELEVATED RANDOM BLOOD GLUCOSE LEVEL: ICD-10-CM

## 2021-10-28 DIAGNOSIS — Z79.899 CHRONIC PRESCRIPTION BENZODIAZEPINE USE: ICD-10-CM

## 2021-10-28 DIAGNOSIS — I25.10 CORONARY ARTERY DISEASE DUE TO LIPID RICH PLAQUE: ICD-10-CM

## 2021-10-28 DIAGNOSIS — Z12.11 COLON CANCER SCREENING: ICD-10-CM

## 2021-10-28 DIAGNOSIS — I49.3 PVC'S (PREMATURE VENTRICULAR CONTRACTIONS): ICD-10-CM

## 2021-10-28 DIAGNOSIS — I47.10 PAROXYSMAL SUPRAVENTRICULAR TACHYCARDIA (H): ICD-10-CM

## 2021-10-28 DIAGNOSIS — F41.1 GENERALIZED ANXIETY DISORDER: ICD-10-CM

## 2021-10-28 DIAGNOSIS — Z13.6 SCREENING FOR AAA (ABDOMINAL AORTIC ANEURYSM): ICD-10-CM

## 2021-10-28 DIAGNOSIS — Z71.85 VACCINE COUNSELING: ICD-10-CM

## 2021-10-28 PROBLEM — I48.91 ATRIAL FIBRILLATION (H): Status: ACTIVE | Noted: 2021-10-28

## 2021-10-28 PROBLEM — I48.91 ATRIAL FIBRILLATION (H): Status: RESOLVED | Noted: 2021-10-28 | Resolved: 2021-10-28

## 2021-10-28 LAB
ALBUMIN SERPL-MCNC: 3.9 G/DL (ref 3.5–5.7)
ALP SERPL-CCNC: 45 U/L (ref 34–104)
ALT SERPL W P-5'-P-CCNC: 10 U/L (ref 7–52)
ANION GAP SERPL CALCULATED.3IONS-SCNC: 5 MMOL/L (ref 3–14)
AST SERPL W P-5'-P-CCNC: 17 U/L (ref 13–39)
BILIRUB SERPL-MCNC: 0.6 MG/DL (ref 0.3–1)
BUN SERPL-MCNC: 17 MG/DL (ref 7–25)
CALCIUM SERPL-MCNC: 9.2 MG/DL (ref 8.6–10.3)
CHLORIDE BLD-SCNC: 99 MMOL/L (ref 98–107)
CHOLEST SERPL-MCNC: 164 MG/DL
CO2 SERPL-SCNC: 30 MMOL/L (ref 21–31)
CREAT SERPL-MCNC: 0.97 MG/DL (ref 0.7–1.3)
ERYTHROCYTE [DISTWIDTH] IN BLOOD BY AUTOMATED COUNT: 12 % (ref 10–15)
FASTING STATUS PATIENT QL REPORTED: NORMAL
GFR SERPL CREATININE-BSD FRML MDRD: 79 ML/MIN/1.73M2
GLUCOSE BLD-MCNC: 93 MG/DL (ref 70–105)
HBA1C MFR BLD: 6 % (ref 4–6.2)
HCT VFR BLD AUTO: 44.2 % (ref 40–53)
HDLC SERPL-MCNC: 48 MG/DL (ref 23–92)
HGB BLD-MCNC: 15.6 G/DL (ref 13.3–17.7)
LDLC SERPL CALC-MCNC: 89 MG/DL
MAGNESIUM SERPL-MCNC: 2.3 MG/DL (ref 1.9–2.7)
MCH RBC QN AUTO: 33.1 PG (ref 26.5–33)
MCHC RBC AUTO-ENTMCNC: 35.3 G/DL (ref 31.5–36.5)
MCV RBC AUTO: 94 FL (ref 78–100)
NONHDLC SERPL-MCNC: 116 MG/DL
PLATELET # BLD AUTO: 251 10E3/UL (ref 150–450)
POTASSIUM BLD-SCNC: 4.7 MMOL/L (ref 3.5–5.1)
PROT SERPL-MCNC: 6 G/DL (ref 6.4–8.9)
PSA SERPL-MCNC: 1.51 UG/L (ref 0–4)
RBC # BLD AUTO: 4.72 10E6/UL (ref 4.4–5.9)
SODIUM SERPL-SCNC: 134 MMOL/L (ref 134–144)
TRIGL SERPL-MCNC: 136 MG/DL
TSH SERPL DL<=0.005 MIU/L-ACNC: 3.11 MU/L (ref 0.4–4)
WBC # BLD AUTO: 6.5 10E3/UL (ref 4–11)

## 2021-10-28 PROCEDURE — 99214 OFFICE O/P EST MOD 30 MIN: CPT | Mod: 25 | Performed by: INTERNAL MEDICINE

## 2021-10-28 PROCEDURE — 83735 ASSAY OF MAGNESIUM: CPT | Mod: ZL

## 2021-10-28 PROCEDURE — G0463 HOSPITAL OUTPT CLINIC VISIT: HCPCS

## 2021-10-28 PROCEDURE — 85027 COMPLETE CBC AUTOMATED: CPT | Mod: ZL

## 2021-10-28 PROCEDURE — 82040 ASSAY OF SERUM ALBUMIN: CPT | Mod: ZL

## 2021-10-28 PROCEDURE — 84443 ASSAY THYROID STIM HORMONE: CPT | Mod: ZL

## 2021-10-28 PROCEDURE — 80061 LIPID PANEL: CPT | Mod: ZL

## 2021-10-28 PROCEDURE — 83036 HEMOGLOBIN GLYCOSYLATED A1C: CPT | Mod: ZL

## 2021-10-28 PROCEDURE — 36415 COLL VENOUS BLD VENIPUNCTURE: CPT | Mod: ZL

## 2021-10-28 PROCEDURE — G0439 PPPS, SUBSEQ VISIT: HCPCS | Performed by: INTERNAL MEDICINE

## 2021-10-28 PROCEDURE — 84153 ASSAY OF PSA TOTAL: CPT | Mod: ZL

## 2021-10-28 RX ORDER — ALPRAZOLAM 0.25 MG
TABLET ORAL
Qty: 40 TABLET | Refills: 0 | Status: SHIPPED | OUTPATIENT
Start: 2021-10-28 | End: 2022-12-24

## 2021-10-28 RX ORDER — LOSARTAN POTASSIUM 25 MG/1
25 TABLET ORAL DAILY
Qty: 90 TABLET | Refills: 3 | Status: SHIPPED | OUTPATIENT
Start: 2021-10-28 | End: 2022-10-28

## 2021-10-28 RX ORDER — ALBUTEROL SULFATE 90 UG/1
AEROSOL, METERED RESPIRATORY (INHALATION)
Qty: 18 G | Refills: 11 | Status: SHIPPED | OUTPATIENT
Start: 2021-10-28 | End: 2021-11-01

## 2021-10-28 RX ORDER — CLOPIDOGREL BISULFATE 75 MG/1
TABLET ORAL
Qty: 90 TABLET | Refills: 3 | Status: SHIPPED | OUTPATIENT
Start: 2021-10-28 | End: 2022-10-28

## 2021-10-28 RX ORDER — ROSUVASTATIN CALCIUM 40 MG/1
20 TABLET, COATED ORAL DAILY
Qty: 90 TABLET | Refills: 3 | Status: SHIPPED | OUTPATIENT
Start: 2021-10-28 | End: 2022-10-28

## 2021-10-28 RX ORDER — SOTALOL HYDROCHLORIDE 80 MG/1
80 TABLET ORAL 2 TIMES DAILY
Qty: 180 TABLET | Refills: 3 | Status: SHIPPED | OUTPATIENT
Start: 2021-10-28 | End: 2022-10-28

## 2021-10-28 RX ORDER — NITROGLYCERIN 0.4 MG/1
TABLET SUBLINGUAL
Qty: 25 TABLET | Refills: 3 | Status: SHIPPED | OUTPATIENT
Start: 2021-10-28 | End: 2023-10-31

## 2021-10-28 RX ORDER — SILDENAFIL 100 MG/1
100 TABLET, FILM COATED ORAL DAILY PRN
Qty: 6 TABLET | Refills: 11 | Status: SHIPPED | OUTPATIENT
Start: 2021-10-28 | End: 2022-10-28

## 2021-10-28 RX ORDER — TAMSULOSIN HYDROCHLORIDE 0.4 MG/1
0.8 CAPSULE ORAL EVERY EVENING
Qty: 180 CAPSULE | Refills: 3 | Status: SHIPPED | OUTPATIENT
Start: 2021-10-28 | End: 2022-10-28

## 2021-10-28 ASSESSMENT — ENCOUNTER SYMPTOMS
FEVER: 0
PALPITATIONS: 0
NERVOUS/ANXIOUS: 1
CHILLS: 0
CONFUSION: 0
WOUND: 0
VOMITING: 0
HEMATURIA: 0
NECK PAIN: 1
AGITATION: 0
DIZZINESS: 0
MYALGIAS: 0
SHORTNESS OF BREATH: 1
LIGHT-HEADEDNESS: 1
NAUSEA: 0
SLEEP DISTURBANCE: 1
DIARRHEA: 1
DYSURIA: 0
BRUISES/BLEEDS EASILY: 0
ARTHRALGIAS: 0
COUGH: 0
ABDOMINAL PAIN: 1
WHEEZING: 0

## 2021-10-28 ASSESSMENT — PATIENT HEALTH QUESTIONNAIRE - PHQ9
SUM OF ALL RESPONSES TO PHQ QUESTIONS 1-9: 10
10. IF YOU CHECKED OFF ANY PROBLEMS, HOW DIFFICULT HAVE THESE PROBLEMS MADE IT FOR YOU TO DO YOUR WORK, TAKE CARE OF THINGS AT HOME, OR GET ALONG WITH OTHER PEOPLE: SOMEWHAT DIFFICULT
SUM OF ALL RESPONSES TO PHQ QUESTIONS 1-9: 10

## 2021-10-28 ASSESSMENT — ANXIETY QUESTIONNAIRES
8. IF YOU CHECKED OFF ANY PROBLEMS, HOW DIFFICULT HAVE THESE MADE IT FOR YOU TO DO YOUR WORK, TAKE CARE OF THINGS AT HOME, OR GET ALONG WITH OTHER PEOPLE?: NOT DIFFICULT AT ALL
GAD7 TOTAL SCORE: 4
GAD7 TOTAL SCORE: 4
3. WORRYING TOO MUCH ABOUT DIFFERENT THINGS: SEVERAL DAYS
7. FEELING AFRAID AS IF SOMETHING AWFUL MIGHT HAPPEN: SEVERAL DAYS
2. NOT BEING ABLE TO STOP OR CONTROL WORRYING: NOT AT ALL
1. FEELING NERVOUS, ANXIOUS, OR ON EDGE: SEVERAL DAYS
GAD7 TOTAL SCORE: 4
5. BEING SO RESTLESS THAT IT IS HARD TO SIT STILL: NOT AT ALL
7. FEELING AFRAID AS IF SOMETHING AWFUL MIGHT HAPPEN: SEVERAL DAYS
4. TROUBLE RELAXING: NOT AT ALL
6. BECOMING EASILY ANNOYED OR IRRITABLE: SEVERAL DAYS

## 2021-10-28 ASSESSMENT — MIFFLIN-ST. JEOR: SCORE: 1872.62

## 2021-10-28 ASSESSMENT — PAIN SCALES - GENERAL: PAINLEVEL: NO PAIN (0)

## 2021-10-28 NOTE — PATIENT INSTRUCTIONS
Patient Education   Personalized Prevention Plan  You are due for the preventive services outlined below.  Your care team is available to assist you in scheduling these services.  If you have already completed any of these items, please share that information with your care team to update in your medical record.  Health Maintenance Due   Topic Date Due     COVID-19 Vaccine (1) Never done     Zoster (Shingles) Vaccine (1 of 2) Never done     AORTIC ANEURYSM SCREENING (SYSTEM ASSIGNED)  Never done     Colorectal Cancer Screening  01/01/2021     Depression Assessment  04/23/2021     FALL RISK ASSESSMENT  10/23/2021         Immunization History   Administered Date(s) Administered     Flu, Unspecified 11/23/2009, 12/09/2010, 10/05/2017     Influenza (High Dose) 3 valent vaccine 09/08/2018, 09/24/2019, 09/17/2020     Influenza (IIV3) PF 10/24/2011, 09/24/2012, 10/08/2013, 10/09/2013     Influenza Vaccine IM > 6 months Valent IIV4 (Alfuria,Fluzone) 10/20/2014, 10/23/2015, 10/12/2016     Influenza, Quad, High Dose, Pf, 65yr+ (Fluzone HD) 09/17/2020, 09/14/2021     Influenza, Whole Virus 12/09/2010     Pneumo Conj 13-V (2010&after) 11/22/2017     Pneumococcal 23 valent 03/23/2010, 11/26/2019     TDAP Vaccine (Boostrix) 12/11/2013      -- Get the 2 shot COVID-19 Vaccination series (1st shot when available and 2nd shot 21 to 30 days later)    -- Consider the **Pfizer or Moderna** vaccine -- or the one shot Tino and Tino.     -- Currently they are recommending people to wait about 90 days after illness from COVID-19, prior to getting their 1st COVID-19 Vaccine --    -- The COVID-19 Booster shots are here.  (Does not have to be the same vaccine as before).    -- Moderna and Pfizer 3rd shots:   - If immunocompromised, can get 3rd shot -- 28+ days from date of 2nd shot.   - Otherwise - vaccine schedule is to get 3rd shot -- about 6 months from date of 2nd shot.   -- Tino and Tino -- 2nd shot   - Okay to get 2nd shot  as a Moderna or Pfizer vaccine -- even if they got the 1st Tino and Tino shot.     -- Consider Annual Flu / Influenza vaccination - Every Fall (Starting about October 1st)    -- Pneumonia / Pneumococcal PCV 23 vaccines are done / completed.       -- Get your tetanus shot updated - from one of the local pharmacies, at your convenience or the Local Public Health Department. --- After 12/11/2023.     -- Get the new shingles shot (2 in series) (Shingrix) - from one of the local pharmacies, at your convenience. -- Check cost/coverage.   -- or -- If these are very expensive, go to the Local Public Health Department     [facility] [place] [address]    ---- Shot clinic is the FIRST Thursday of Each Month -- from 2 to 6 PM, by Appointment only.     Call for an appointment -- [phone]      Blood pressure is well controlled.   Medications refilled.   Labs are stable.     Return in approximately 1 year, or sooner as needed for follow-up with Dr. Adams.  - Annual Follow-up / Physical - Medicare Annual Wellness Visit     Clinic : [phone]  Appointment line: [phone]
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
## 2021-10-29 PROBLEM — I47.10 PAROXYSMAL SUPRAVENTRICULAR TACHYCARDIA (H): Status: ACTIVE | Noted: 2021-10-29

## 2021-10-29 ASSESSMENT — PATIENT HEALTH QUESTIONNAIRE - PHQ9: SUM OF ALL RESPONSES TO PHQ QUESTIONS 1-9: 10

## 2021-10-29 ASSESSMENT — ANXIETY QUESTIONNAIRES: GAD7 TOTAL SCORE: 4

## 2021-11-01 RX ORDER — ALBUTEROL SULFATE 90 UG/1
AEROSOL, METERED RESPIRATORY (INHALATION)
Qty: 54 G | Refills: 3 | Status: SHIPPED | OUTPATIENT
Start: 2021-11-01 | End: 2022-04-05

## 2021-11-01 NOTE — TELEPHONE ENCOUNTER
albuterol (PROAIR HFA/PROVENTIL HFA/VENTOLIN HFA) 108 (90 Base) MCG/ACT inhaler 18 g 11 10/28/2021  No   Sig: INHALE 2 PUFFS BY MOUTH FOUR TIMES DAILY AS NEEDED FOR COUGH OR SHORTNESS OF BREATH     To Beatriz  Patient looking for a 90 day supply  Overridden by Edenilson Adams MD on Oct 28, 2021 10:09 AM   Drug-Drug   1. BETA-ADRENERGIC BLOCKERS / SYMPATHOMIMETICS [Level: Major] [Reason: Tolerated medication/side effects in past]   Other Orders: sotalol (BETAPACE) 80 MG tablet      2. BETA-ADRENERGIC BLOCKERS / SYMPATHOMIMETICS [Level: Major] [Reason: Tolerated medication/side effects in past]   Other Orders: sotalol (BETAPACE) 80 MG tablet          Alexandra Jiménez RN on 11/1/2021 at 9:02 AM

## 2021-11-03 ENCOUNTER — MYC MEDICAL ADVICE (OUTPATIENT)
Dept: INTERNAL MEDICINE | Facility: OTHER | Age: 71
End: 2021-11-03
Payer: COMMERCIAL

## 2021-11-03 DIAGNOSIS — N40.1 BENIGN PROSTATIC HYPERPLASIA WITH NOCTURIA: ICD-10-CM

## 2021-11-03 DIAGNOSIS — R35.1 BENIGN PROSTATIC HYPERPLASIA WITH NOCTURIA: ICD-10-CM

## 2021-11-08 PROBLEM — N40.1 BENIGN PROSTATIC HYPERPLASIA WITH NOCTURIA: Status: ACTIVE | Noted: 2021-11-08

## 2021-11-08 PROBLEM — R35.1 BENIGN PROSTATIC HYPERPLASIA WITH NOCTURIA: Status: ACTIVE | Noted: 2021-11-08

## 2021-11-08 RX ORDER — FINASTERIDE 5 MG/1
5 TABLET, FILM COATED ORAL DAILY
Qty: 90 TABLET | Refills: 3 | Status: SHIPPED | OUTPATIENT
Start: 2021-11-08 | End: 2022-10-28

## 2021-11-11 LAB — COLOGUARD-ABSTRACT: NEGATIVE

## 2021-11-29 ENCOUNTER — HOSPITAL ENCOUNTER (OUTPATIENT)
Dept: ULTRASOUND IMAGING | Facility: HOSPITAL | Age: 71
Discharge: HOME | End: 2021-11-29
Attending: INTERNAL MEDICINE | Admitting: INTERNAL MEDICINE
Payer: MEDICARE

## 2021-11-29 DIAGNOSIS — Z13.6 SCREENING FOR AAA (ABDOMINAL AORTIC ANEURYSM): ICD-10-CM

## 2021-11-29 PROCEDURE — 76706 US ABDL AORTA SCREEN AAA: CPT

## 2022-04-05 DIAGNOSIS — J98.01 BRONCHOSPASM: ICD-10-CM

## 2022-04-05 DIAGNOSIS — Z87.891 HISTORY OF TOBACCO ABUSE: Primary | ICD-10-CM

## 2022-04-05 RX ORDER — ALBUTEROL SULFATE 90 UG/1
1-2 AEROSOL, METERED RESPIRATORY (INHALATION) EVERY 4 HOURS PRN
Qty: 54 G | Refills: 1 | Status: SHIPPED | OUTPATIENT
Start: 2022-04-05 | End: 2023-08-10

## 2022-04-05 RX ORDER — FUROSEMIDE 20 MG
TABLET ORAL
COMMUNITY
Start: 2022-01-28 | End: 2022-10-28

## 2022-04-05 NOTE — TELEPHONE ENCOUNTER
Patient needs refill for Albuterol inhaler. Would like it sent to [facility] in [place].      Nenita Marmolejo on 4/5/2022 at 1:11 PM

## 2022-05-27 ENCOUNTER — HOSPITAL ENCOUNTER (EMERGENCY)
Facility: HOSPITAL | Age: 72
Discharge: HOME | End: 2022-05-27
Attending: NURSE PRACTITIONER | Admitting: NURSE PRACTITIONER
Payer: MEDICARE

## 2022-05-27 ENCOUNTER — APPOINTMENT (OUTPATIENT)
Dept: GENERAL RADIOLOGY | Facility: HOSPITAL | Age: 72
End: 2022-05-27
Attending: NURSE PRACTITIONER
Payer: MEDICARE

## 2022-05-27 VITALS
DIASTOLIC BLOOD PRESSURE: 86 MMHG | TEMPERATURE: 97.6 F | HEART RATE: 62 BPM | OXYGEN SATURATION: 97 % | SYSTOLIC BLOOD PRESSURE: 162 MMHG | RESPIRATION RATE: 20 BRPM

## 2022-05-27 DIAGNOSIS — J20.9 ACUTE BRONCHITIS, UNSPECIFIED ORGANISM: ICD-10-CM

## 2022-05-27 DIAGNOSIS — J20.9 ACUTE BRONCHITIS: Primary | ICD-10-CM

## 2022-05-27 LAB
FLUAV RNA SPEC QL NAA+PROBE: NEGATIVE
FLUBV RNA RESP QL NAA+PROBE: NEGATIVE
RSV RNA SPEC NAA+PROBE: NEGATIVE
SARS-COV-2 RNA RESP QL NAA+PROBE: NEGATIVE

## 2022-05-27 PROCEDURE — C9803 HOPD COVID-19 SPEC COLLECT: HCPCS

## 2022-05-27 PROCEDURE — G0463 HOSPITAL OUTPT CLINIC VISIT: HCPCS | Mod: 25

## 2022-05-27 PROCEDURE — 87637 SARSCOV2&INF A&B&RSV AMP PRB: CPT | Performed by: NURSE PRACTITIONER

## 2022-05-27 PROCEDURE — 71045 X-RAY EXAM CHEST 1 VIEW: CPT

## 2022-05-27 PROCEDURE — 99213 OFFICE O/P EST LOW 20 MIN: CPT | Performed by: NURSE PRACTITIONER

## 2022-05-27 RX ORDER — PREDNISONE 20 MG/1
TABLET ORAL
Qty: 10 TABLET | Refills: 0 | Status: SHIPPED | OUTPATIENT
Start: 2022-05-27 | End: 2022-10-28

## 2022-05-27 RX ORDER — DOXYCYCLINE HYCLATE 100 MG
100 TABLET ORAL 2 TIMES DAILY
Qty: 14 TABLET | Refills: 0 | Status: SHIPPED | OUTPATIENT
Start: 2022-05-27 | End: 2022-06-03

## 2022-05-27 ASSESSMENT — ENCOUNTER SYMPTOMS
EYE REDNESS: 0
DIARRHEA: 0
MYALGIAS: 1
EYE PAIN: 0
HEADACHES: 1
SHORTNESS OF BREATH: 1
CHILLS: 0
COUGH: 1
SINUS PAIN: 1
RHINORRHEA: 0
FEVER: 0
SORE THROAT: 0
VOMITING: 0
EYE ITCHING: 0
PALPITATIONS: 0
NAUSEA: 1
SINUS PRESSURE: 1
PSYCHIATRIC NEGATIVE: 1

## 2022-05-27 NOTE — ED TRIAGE NOTES
Pt presents with cough, sob, not sleeping, rib/lung pain for three weeks. Pt denies fever. Pt has been using albuterol that has been providing temporary relief.

## 2022-05-27 NOTE — DISCHARGE INSTRUCTIONS
Doxycycline as ordered  - Take entire course of antibiotic even if you start to feel better.  - Antibiotics can cause stomach upset including nausea and diarrhea. Read your bottle or ask the pharmacist if antibiotic can be taken with food to help prevent nausea. If you have symptoms of diarrhea you can take an over-the-counter probiotic and/or increase foods with probiotics such as yogurt, [place], sauerkraut.    Prednisone as ordered    Continue albuterol inhaler as needed.    Follow-up with primary care provider or return to urgent care-ED with any worsening in condition or additional concerns.

## 2022-05-27 NOTE — ED PROVIDER NOTES
History     Chief Complaint   Patient presents with     Cough     HPI  Héctor Thornton is a 71 year old male who presents to urgent care today (ambulatory) with complaints of congestion, sinus pain and pressure, cough, SOB, nausea, myalgia and headache which started a week and a half ago.  Spouse sick with similar symptoms.  Smokes tobacco.  Albuterol for COPD/Emphema last took at 0700, which is helpful.  [employer] COVID Vaccine completed 11/4/2021, no booster.  No history of known COVID.  No other concerns.         Allergies:  Allergies   Allergen Reactions     Iodine Shortness Of Breath     Runny Nose  Watery eyes     Omeprazole Other (See Comments)     Hives, then respiratory distress         Ticagrelor Nausea and Shortness Of Breath     Abdominal pain, bloating - diarrhea, possible cause of stomach bleeding     Lisinopril Cough     Metoprolol      Other reaction(s): Bradycardia       Problem List:    Patient Active Problem List    Diagnosis Date Noted     Benign prostatic hyperplasia with nocturia 11/08/2021     Priority: Medium     Paroxysmal supraventricular tachycardia (H) 10/29/2021     Priority: Medium     Morbid obesity (H) 10/28/2021     Priority: Medium     Ulnar neuropathy at elbow, unspecified laterality - Bilaterally 10/23/2020     Priority: Medium     Numbness and tingling of both feet 10/23/2020     Priority: Medium     Generalized anxiety disorder 04/10/2019     Priority: Medium          Food sticks on swallowing 06/05/2018     Priority: Medium     History of ST elevation myocardial infarction (STEMI) 08/23/2016     Priority: Medium     History of tobacco abuse 08/23/2016     Priority: Medium     Chronic diastolic heart failure (H) 02/03/2016     Priority: Medium     Seasonal allergies 09/14/2015     Priority: Medium     PVC's (premature ventricular contractions) 08/13/2015     Priority: Medium     Bilateral leg edema 08/12/2014     Priority: Medium     Hx of right coronary artery stent placement  08/12/2014     Priority: Medium     Old inferior wall myocardial infarction 08/12/2014     Priority: Medium     ED (erectile dysfunction) 07/29/2013     Priority: Medium     Coronary artery disease due to lipid rich plaque 04/22/2011     Priority: Medium     Overview:   Inferior Infarct June 2008 BMS placed to distal RCA  Non transmural MI march 2010.late stenosis to RCA OTTO placed at [facility]       History of major depression 02/03/2011     Priority: Medium     Overview:   With somatization       Palpitations 02/03/2011     Priority: Medium     Ischemic heart disease, chronic 12/13/2010     Priority: Medium     Familial hyperlipidemia 12/13/2010     Priority: Medium     Dysthymia 11/22/2010     Priority: Medium     Heartburn 11/22/2010     Priority: Medium     Benign essential hypertension 03/22/2010     Priority: Medium        Past Medical History:    Past Medical History:   Diagnosis Date     Anxiety disorder      Atherosclerotic heart disease of native coronary artery without angina pectoris      Chest pain      Chronic ischemic heart disease      Constipation      Epigastric pain      Essential (primary) hypertension      Gastro-esophageal reflux disease without esophagitis      Generalized anxiety disorder      Hyperlipidemia      Major depressive disorder, recurrent severe without psychotic features (H)      Major depressive disorder, single episode      Male erectile dysfunction      Other fatigue      Other injury of unspecified body region, initial encounter (CODE)      Palpitations      Personal history of other endocrine, nutritional and metabolic disease      Tobacco use        Past Surgical History:    Past Surgical History:   Procedure Laterality Date     ARTHROSCOPY KNEE      lateral release, left knee     BACK SURGERY      age 19,fractured coccyx     COLONOSCOPY  01/01/2011 2011,Diverticulosis. No polyps.  Next due 2021.     ESOPHAGOSCOPY, GASTROSCOPY, DUODENOSCOPY (EGD), COMBINED       13,no HH, no GERD (one interpretation of this might be that the PPI has been successful in preventing injury.)     FINGER SURGERY      right hand repair     HEART CATH, ANGIOPLASTY      2008,   3/2009,right coronary stents     TONSILLECTOMY      age 7       Family History:    Family History   Problem Relation Age of Onset     Heart Disease Father         Heart Disease,CAD     Cancer Father         Cancer,Bladder     Diabetes Father         Diabetes     Family History Negative Brother         Good Health,x5     Family History Negative Sister         Good Health,x2       Social History:  Marital Status:   [2]  Social History     Tobacco Use     Smoking status: Former Smoker     Packs/day: 0.00     Years: 45.00     Pack years: 0.00     Types: Pipe     Quit date: 2000     Years since quittin.4     Smokeless tobacco: Current User     Types: Chew     Last attempt to quit: 1979     Tobacco comment: Quit smoking: -- No longer using cigarettes, occasionally smokes a pipe   Vaping Use     Vaping Use: Never used   Substance Use Topics     Alcohol use: No     Alcohol/week: 0.0 standard drinks     Drug use: No        Medications:    albuterol (PROAIR HFA/PROVENTIL HFA/VENTOLIN HFA) 108 (90 Base) MCG/ACT inhaler  doxycycline hyclate (VIBRA-TABS) 100 MG tablet  predniSONE (DELTASONE) 20 MG tablet  ALPRAZolam (XANAX) 0.25 MG tablet  aspirin EC 81 MG EC tablet  clopidogrel (PLAVIX) 75 MG tablet  finasteride (PROSCAR) 5 MG tablet  fish oil-omega-3 fatty acids 1000 MG capsule  furosemide (LASIX) 20 MG tablet  losartan (COZAAR) 25 MG tablet  multivitamin, therapeutic with minerals (MULTI-VITAMIN) TABS  mupirocin (BACTROBAN) 2 % external cream  nitroGLYcerin (NITROSTAT) 0.4 MG sublingual tablet  rosuvastatin (CRESTOR) 40 MG tablet  SACCHAROMYCES BOULARDII PO  sertraline (ZOLOFT) 50 MG tablet  sildenafil (VIAGRA) 100 MG tablet  sotalol (BETAPACE) 80 MG tablet  tamsulosin (FLOMAX) 0.4 MG capsule      Review of  Systems   Constitutional: Negative for chills and fever.   HENT: Positive for congestion, sinus pressure and sinus pain. Negative for ear pain, rhinorrhea and sore throat.    Eyes: Negative for pain, redness and itching.   Respiratory: Positive for cough and shortness of breath.    Cardiovascular: Negative for chest pain and palpitations.   Gastrointestinal: Positive for nausea. Negative for diarrhea and vomiting.   Genitourinary: Negative for decreased urine volume.   Musculoskeletal: Positive for myalgias. Negative for gait problem.   Skin: Positive for rash (urticarial rash, then resolved 3 days ago).   Neurological: Positive for headaches.   Psychiatric/Behavioral: Negative.      Physical Exam   BP: 162/86  Pulse: 62  Temp: 97.6  F (36.4  C)  Resp: 20  SpO2: 97 %    Physical Exam  Vitals and nursing note reviewed.   Constitutional:       Appearance: Normal appearance. He is normal weight.   HENT:      Head: Normocephalic.      Right Ear: Tympanic membrane, ear canal and external ear normal.      Left Ear: Tympanic membrane, ear canal and external ear normal.      Nose: Congestion and rhinorrhea present.      Mouth/Throat:      Mouth: Mucous membranes are moist.      Pharynx: Oropharynx is clear. No oropharyngeal exudate or posterior oropharyngeal erythema.   Cardiovascular:      Rate and Rhythm: Normal rate and regular rhythm.      Pulses: Normal pulses.      Heart sounds: Normal heart sounds.   Pulmonary:      Effort: Pulmonary effort is normal. No respiratory distress.      Breath sounds: No stridor. Wheezing present. No rhonchi or rales.   Abdominal:      General: Bowel sounds are normal.      Palpations: Abdomen is soft.      Tenderness: There is no abdominal tenderness.   Skin:     General: Skin is warm and dry.      Capillary Refill: Capillary refill takes less than 2 seconds.   Neurological:      Mental Status: He is alert.   Psychiatric:         Mood and Affect: Mood normal.       ED Course     Results  for orders placed or performed during the hospital encounter of 05/27/22 (from the past 24 hour(s))   Symptomatic; Unknown Influenza A/B & SARS-CoV2 (COVID-19) Virus PCR Multiplex Nasopharyngeal    Specimen: Nasopharyngeal; Swab   Result Value Ref Range    Influenza A PCR Negative Negative    Influenza B PCR Negative Negative    RSV PCR Negative Negative    SARS CoV2 PCR Negative Negative    Narrative    Testing was performed using the Xpert Xpress CoV2/Flu/RSV Assay on the [employer] GeneXpert Instrument. This test should be ordered for the detection of SARS-CoV-2 and influenza viruses in individuals who meet clinical and/or epidemiological criteria. Test performance is unknown in asymptomatic patients. This test is for in vitro diagnostic use under the FDA EUA for laboratories certified under CLIA to perform high or moderate complexity testing. This test has not been FDA cleared or approved. A negative result does not rule out the presence of PCR inhibitors in the specimen or target RNA in concentration below the limit of detection for the assay. If only one viral target is positive but coinfection with multiple targets is suspected, the sample should be re-tested with another FDA cleared, approved, or authorized test, if coinfection would change clinical management. This test was validated by the [facility]. These laboratories are certified under the Clinical  Laboratory Improvement Amendments of 1988 (CLIA-88) as qualified to perform high complexity laboratory testing.   XR Chest Port 1 View    Narrative    PROCEDURE:  XR CHEST PORT 1 VIEW    HISTORY: cough/wheezing 10 days.. .    COMPARISON:  9/7/2016    FINDINGS:    The cardiomediastinal contours are stable.  No focal consolidation, effusion or pneumothorax.      Impression    IMPRESSION:  No focal consolidation.      VIRY WILCOX MD         SYSTEM ID:  [id number]       Medications - No data to display    Assessments & Plan (with Medical  Decision Making)     I have reviewed the nursing notes.    I have reviewed the findings, diagnosis, plan and need for follow up with the patient.  (J20.9) Acute bronchitis  (primary encounter diagnosis)  Plan:   Patient ambulatory with a nontoxic appearance.  Expiratory wheezes noted.  Denies any fever, chills, vomiting, diarrhea or chest pain.  History of STEMI, denies chest pain and declines cardiac work-up for shortness of breath.  No history of DVT/PE, declines PE work-up for shortness of breath.  Spouse sick with similar symptoms.  Patient's symptoms are consistent with a viral illness with a COPD flareup.  Do not see prior diagnosis for COPD but patient states he is pretty sure he has it.  Smokes tobacco.  Portable chest x-ray completed and impression shows no focal consolidation.  We will treat for acute bronchitis/possible COPD flareup.  Patient to start doxycycline and prednisone as ordered.  Patient to continue his albuterol inhaler as needed.  Patient to follow-up with primary care provider or return to urgent care-ED with any worsening in condition or additional concerns.  Patient is in agreement with treatment plan.    Discharge Medication List as of 5/27/2022 11:23 AM      START taking these medications    Details   doxycycline hyclate (VIBRA-TABS) 100 MG tablet Take 1 tablet (100 mg) by mouth 2 times daily for 7 days, Disp-14 tablet, R-0, E-Prescribe      predniSONE (DELTASONE) 20 MG tablet Take two tablets (= 40mg) each day for 5 (five) days, Disp-10 tablet, R-0, E-Prescribe           Final diagnoses:   Acute bronchitis     5/27/2022   [facility]     Joslyn Thornton NP  05/27/22 [id number]

## 2022-05-27 NOTE — ED TRIAGE NOTES
Patient presents with complaints of a cough for 3 weeks now. States he took an at home COVID 2 days ago and it was negative

## 2022-09-04 ENCOUNTER — HEALTH MAINTENANCE LETTER (OUTPATIENT)
Age: 72
End: 2022-09-04

## 2022-09-21 ENCOUNTER — TELEPHONE (OUTPATIENT)
Dept: INTERNAL MEDICINE | Facility: OTHER | Age: 72
End: 2022-09-21

## 2022-09-21 DIAGNOSIS — I50.32 CHRONIC DIASTOLIC HEART FAILURE (H): ICD-10-CM

## 2022-09-21 DIAGNOSIS — I25.2 HISTORY OF ST ELEVATION MYOCARDIAL INFARCTION (STEMI): ICD-10-CM

## 2022-09-21 DIAGNOSIS — I25.83 CORONARY ARTERY DISEASE DUE TO LIPID RICH PLAQUE: Primary | ICD-10-CM

## 2022-09-21 DIAGNOSIS — E78.49 FAMILIAL HYPERLIPIDEMIA: ICD-10-CM

## 2022-09-21 DIAGNOSIS — I25.10 CORONARY ARTERY DISEASE DUE TO LIPID RICH PLAQUE: Primary | ICD-10-CM

## 2022-09-21 NOTE — TELEPHONE ENCOUNTER
The patient's Cardiologist is gone from the clinic he was at.  The patient would like to get a referral to a Cardiologist at [facility].

## 2022-10-28 ENCOUNTER — OFFICE VISIT (OUTPATIENT)
Dept: INTERNAL MEDICINE | Facility: OTHER | Age: 72
End: 2022-10-28
Attending: INTERNAL MEDICINE
Payer: COMMERCIAL

## 2022-10-28 VITALS
TEMPERATURE: 97.5 F | HEART RATE: 57 BPM | OXYGEN SATURATION: 97 % | SYSTOLIC BLOOD PRESSURE: 118 MMHG | BODY MASS INDEX: 37.12 KG/M2 | WEIGHT: 250.6 LBS | RESPIRATION RATE: 18 BRPM | HEIGHT: 69 IN | DIASTOLIC BLOOD PRESSURE: 68 MMHG

## 2022-10-28 DIAGNOSIS — F41.1 GENERALIZED ANXIETY DISORDER: ICD-10-CM

## 2022-10-28 DIAGNOSIS — F41.0 PANIC ATTACKS: ICD-10-CM

## 2022-10-28 DIAGNOSIS — N40.1 BENIGN PROSTATIC HYPERPLASIA WITH NOCTURIA: ICD-10-CM

## 2022-10-28 DIAGNOSIS — I47.10 PAROXYSMAL SUPRAVENTRICULAR TACHYCARDIA (H): ICD-10-CM

## 2022-10-28 DIAGNOSIS — I25.83 CORONARY ARTERY DISEASE DUE TO LIPID RICH PLAQUE: ICD-10-CM

## 2022-10-28 DIAGNOSIS — Z23 NEEDS FLU SHOT: ICD-10-CM

## 2022-10-28 DIAGNOSIS — Z71.85 VACCINE COUNSELING: ICD-10-CM

## 2022-10-28 DIAGNOSIS — I10 BENIGN ESSENTIAL HYPERTENSION: ICD-10-CM

## 2022-10-28 DIAGNOSIS — I25.9 ISCHEMIC HEART DISEASE, CHRONIC: ICD-10-CM

## 2022-10-28 DIAGNOSIS — I25.10 CORONARY ARTERY DISEASE DUE TO LIPID RICH PLAQUE: ICD-10-CM

## 2022-10-28 DIAGNOSIS — R00.2 PALPITATIONS: ICD-10-CM

## 2022-10-28 DIAGNOSIS — Z00.00 ENCOUNTER FOR MEDICARE ANNUAL WELLNESS EXAM: ICD-10-CM

## 2022-10-28 DIAGNOSIS — R35.1 BENIGN PROSTATIC HYPERPLASIA WITH NOCTURIA: ICD-10-CM

## 2022-10-28 DIAGNOSIS — E66.01 MORBID OBESITY (H): ICD-10-CM

## 2022-10-28 DIAGNOSIS — I50.20 HEART FAILURE WITH REDUCED EJECTION FRACTION (H): Primary | ICD-10-CM

## 2022-10-28 DIAGNOSIS — E78.49 FAMILIAL HYPERLIPIDEMIA: ICD-10-CM

## 2022-10-28 DIAGNOSIS — I49.3 PVC'S (PREMATURE VENTRICULAR CONTRACTIONS): ICD-10-CM

## 2022-10-28 PROCEDURE — 99214 OFFICE O/P EST MOD 30 MIN: CPT | Mod: 25 | Performed by: INTERNAL MEDICINE

## 2022-10-28 PROCEDURE — G0439 PPPS, SUBSEQ VISIT: HCPCS | Performed by: INTERNAL MEDICINE

## 2022-10-28 PROCEDURE — G0463 HOSPITAL OUTPT CLINIC VISIT: HCPCS | Mod: 25

## 2022-10-28 PROCEDURE — G0008 ADMIN INFLUENZA VIRUS VAC: HCPCS

## 2022-10-28 RX ORDER — SERTRALINE HYDROCHLORIDE 25 MG/1
25-50 TABLET, FILM COATED ORAL DAILY
Qty: 180 TABLET | Refills: 1 | Status: SHIPPED | OUTPATIENT
Start: 2022-10-28 | End: 2022-12-24

## 2022-10-28 RX ORDER — LOSARTAN POTASSIUM 25 MG/1
25 TABLET ORAL DAILY
Qty: 90 TABLET | Refills: 3 | Status: SHIPPED | OUTPATIENT
Start: 2022-10-28 | End: 2022-12-24

## 2022-10-28 RX ORDER — ROSUVASTATIN CALCIUM 20 MG/1
TABLET, COATED ORAL
COMMUNITY
Start: 2022-08-23 | End: 2022-10-28

## 2022-10-28 RX ORDER — CLOPIDOGREL BISULFATE 75 MG/1
TABLET ORAL
Qty: 90 TABLET | Refills: 3 | Status: SHIPPED | OUTPATIENT
Start: 2022-10-28 | End: 2022-12-24

## 2022-10-28 RX ORDER — FUROSEMIDE 20 MG
40 TABLET ORAL
COMMUNITY
Start: 2022-06-10 | End: 2022-12-22

## 2022-10-28 RX ORDER — FUROSEMIDE 20 MG
40 TABLET ORAL DAILY
Qty: 180 TABLET | Refills: 4 | Status: SHIPPED | OUTPATIENT
Start: 2022-10-28 | End: 2023-01-02

## 2022-10-28 RX ORDER — TAMSULOSIN HYDROCHLORIDE 0.4 MG/1
0.8 CAPSULE ORAL EVERY EVENING
Qty: 180 CAPSULE | Refills: 4 | Status: SHIPPED | OUTPATIENT
Start: 2022-10-28 | End: 2023-10-31

## 2022-10-28 RX ORDER — SOTALOL HYDROCHLORIDE 80 MG/1
TABLET ORAL
Qty: 180 TABLET | Refills: 3 | COMMUNITY
Start: 2022-10-28 | End: 2022-12-14

## 2022-10-28 RX ORDER — SOTALOL HYDROCHLORIDE 160 MG/1
TABLET ORAL
COMMUNITY
Start: 2022-05-11 | End: 2022-10-28

## 2022-10-28 RX ORDER — ROSUVASTATIN CALCIUM 20 MG/1
20 TABLET, COATED ORAL DAILY
Qty: 90 TABLET | Refills: 1 | Status: SHIPPED | OUTPATIENT
Start: 2022-10-28 | End: 2023-03-30

## 2022-10-28 ASSESSMENT — ENCOUNTER SYMPTOMS
WHEEZING: 0
COUGH: 0
CONFUSION: 0
SHORTNESS OF BREATH: 1
DIARRHEA: 0
FEVER: 0
SLEEP DISTURBANCE: 1
CHILLS: 1
WOUND: 0
CHILLS: 0
DYSURIA: 0
FREQUENCY: 1
NAUSEA: 0
ARTHRALGIAS: 1
BRUISES/BLEEDS EASILY: 0
AGITATION: 0
LIGHT-HEADEDNESS: 1
MYALGIAS: 1
ABDOMINAL PAIN: 1
DIARRHEA: 1
VOMITING: 0
PALPITATIONS: 0
NECK PAIN: 1
HEMATURIA: 0
NERVOUS/ANXIOUS: 1
DIZZINESS: 0

## 2022-10-28 ASSESSMENT — ACTIVITIES OF DAILY LIVING (ADL): CURRENT_FUNCTION: NO ASSISTANCE NEEDED

## 2022-10-28 ASSESSMENT — ANXIETY QUESTIONNAIRES
5. BEING SO RESTLESS THAT IT IS HARD TO SIT STILL: NOT AT ALL
3. WORRYING TOO MUCH ABOUT DIFFERENT THINGS: SEVERAL DAYS
1. FEELING NERVOUS, ANXIOUS, OR ON EDGE: MORE THAN HALF THE DAYS
7. FEELING AFRAID AS IF SOMETHING AWFUL MIGHT HAPPEN: SEVERAL DAYS
IF YOU CHECKED OFF ANY PROBLEMS ON THIS QUESTIONNAIRE, HOW DIFFICULT HAVE THESE PROBLEMS MADE IT FOR YOU TO DO YOUR WORK, TAKE CARE OF THINGS AT HOME, OR GET ALONG WITH OTHER PEOPLE: SOMEWHAT DIFFICULT
GAD7 TOTAL SCORE: 7
6. BECOMING EASILY ANNOYED OR IRRITABLE: SEVERAL DAYS
7. FEELING AFRAID AS IF SOMETHING AWFUL MIGHT HAPPEN: SEVERAL DAYS
GAD7 TOTAL SCORE: 7
2. NOT BEING ABLE TO STOP OR CONTROL WORRYING: SEVERAL DAYS
8. IF YOU CHECKED OFF ANY PROBLEMS, HOW DIFFICULT HAVE THESE MADE IT FOR YOU TO DO YOUR WORK, TAKE CARE OF THINGS AT HOME, OR GET ALONG WITH OTHER PEOPLE?: SOMEWHAT DIFFICULT
4. TROUBLE RELAXING: SEVERAL DAYS
GAD7 TOTAL SCORE: 7

## 2022-10-28 ASSESSMENT — PATIENT HEALTH QUESTIONNAIRE - PHQ9
10. IF YOU CHECKED OFF ANY PROBLEMS, HOW DIFFICULT HAVE THESE PROBLEMS MADE IT FOR YOU TO DO YOUR WORK, TAKE CARE OF THINGS AT HOME, OR GET ALONG WITH OTHER PEOPLE: SOMEWHAT DIFFICULT
SUM OF ALL RESPONSES TO PHQ QUESTIONS 1-9: 11
SUM OF ALL RESPONSES TO PHQ QUESTIONS 1-9: 11

## 2022-10-28 ASSESSMENT — PAIN SCALES - GENERAL: PAINLEVEL: SEVERE PAIN (6)

## 2022-10-28 NOTE — PROGRESS NOTES
"SUBJECTIVE:   Héctor is a 72 year old who presents for Preventive Visit.    Patient has been advised of split billing requirements and indicates understanding: Yes  Are you in the first 12 months of your Medicare coverage?  No    Healthy Habits:     In general, how would you rate your overall health?  Poor    Frequency of exercise:  1 day/week    Duration of exercise:  Less than 15 minutes    Do you usually eat at least 4 servings of fruit and vegetables a day, include whole grains    & fiber and avoid regularly eating high fat or \"junk\" foods?  No    Taking medications regularly:  Yes    Medication side effects:  Muscle aches and Lightheadedness    Ability to successfully perform activities of daily living:  No assistance needed    Home Safety:  No safety concerns identified    Hearing Impairment:  No hearing concerns    In the past 6 months, have you been bothered by leaking of urine? Yes    In general, how would you rate your overall mental or emotional health?  Poor      PHQ-2 Total Score: 3    Additional concerns today:  No    Do you feel safe in your environment? Yes    Have you ever done Advance Care Planning? (For example, a Health Directive, POLST, or a discussion with a medical provider or your loved ones about your wishes): Patient declined Advanced care paperwork       Fall risk  Fallen 2 or more times in the past year?: No  Any fall with injury in the past year?: No    Cognitive Screening   1) Repeat 3 items (Leader, Season, Table)    2) Clock draw: NORMAL  3) 3 item recall: Recalls 3 objects  Results: 3 items recalled: COGNITIVE IMPAIRMENT LESS LIKELY    Mini-CogTM Copyright BIBI Rajan. Licensed by the author for use in [facility]; reprinted with permission ([email or web address]). All rights reserved.      Do you have sleep apnea, excessive snoring or daytime drowsiness?: no    Reviewed and updated as needed this visit by clinical staff   Tobacco  Allergies  Meds  Problems  Med Hx  Surg Hx  Fam " Hx  Soc   Hx        Reviewed and updated as needed this visit by Provider   Tobacco  Allergies  Meds  Problems  Med Hx  Surg Hx  Fam Hx         Social History     Tobacco Use     Smoking status: Former     Packs/day: 0.00     Years: 45.00     Pack years: 0.00     Types: Pipe, Cigarettes     Quit date: 2000     Years since quittin.8     Smokeless tobacco: Current     Types: Chew     Last attempt to quit: 1979     Tobacco comments:     Quit smoking: -- No longer using cigarettes, occasionally smokes a pipe   Substance Use Topics     Alcohol use: No     Alcohol/week: 0.0 standard drinks         Alcohol Use 10/28/2022   Prescreen: >3 drinks/day or >7 drinks/week? Not Applicable     Review current opioid prescriptions    For a patient with a current opioid prescription:    Reviewed potential Opioid Use Disorder (OUD) risk factors: Yes     Evaluate their pain severity and current treatment plan:     Provide information on non-opioid treatment options:    Refer to a specialist, as appropriate:    Get more information on pain management in the [facility] Pain Management Best Practices Inter-agency Task Force Report    Screen for potential Substance Use Disorders (SUDs)    Reviewed the patient s potential risk factors for SUDs: Yes     Refer to treatment or specialist, as appropriate:     A screening tool isn t required but you may use one:  Find more information in the National [place] on Drug Abuse Screening and Assessment Tools Chart    Current providers sharing in care for this patient include:   Patient Care Team:  Edenilson Adams MD as PCP - General (Internal Medicine)  Edenilson Adams MD as Assigned PCP    The following health maintenance items are reviewed in Epic and correct as of today:  Health Maintenance   Topic Date Due     HEPATITIS B IMMUNIZATION (1 of 3 - 3-dose series) Never done     ZOSTER IMMUNIZATION (1 of 2) Never done     COVID-19 Vaccine (2 - Booster for Nadia series) 2021      "PHQ-9  04/28/2023     MEDICARE ANNUAL WELLNESS VISIT  10/28/2023     FALL RISK ASSESSMENT  10/28/2023     DTAP/TDAP/TD IMMUNIZATION (2 - Td or Tdap) 12/11/2023     COLORECTAL CANCER SCREENING  11/11/2024     LIPID  10/28/2026     ADVANCE CARE PLANNING  10/29/2026     INFLUENZA VACCINE  Completed     Pneumococcal Vaccine: 65+ Years  Completed     AORTIC ANEURYSM SCREENING (SYSTEM ASSIGNED)  Completed     HEPATITIS C SCREENING  Addressed     DEPRESSION ACTION PLAN  Addressed     IPV IMMUNIZATION  Aged Out     MENINGITIS IMMUNIZATION  Aged Out     Review of Systems   Constitutional: Negative for chills and fever.   HENT: Negative for congestion and hearing loss.    Eyes: Negative for visual disturbance.   Respiratory: Positive for shortness of breath. Negative for cough and wheezing.    Cardiovascular: Positive for peripheral edema. Negative for chest pain and palpitations.   Gastrointestinal: Positive for abdominal pain (+ Right lower quadrant intermittently, noted with bending and lifting. will lay down and will release). Negative for diarrhea (+ Improved with less Sertraline dose), nausea and vomiting.   Endocrine: Negative for cold intolerance and heat intolerance.   Genitourinary: Positive for frequency, impotence and urgency. Negative for dysuria, hematuria and penile discharge.   Musculoskeletal: Positive for arthralgias, myalgias and neck pain.        + restless legs   Skin: Negative for rash and wound.   Allergic/Immunologic: Negative for immunocompromised state.   Neurological: Positive for light-headedness. Negative for dizziness.   Hematological: Does not bruise/bleed easily.   Psychiatric/Behavioral: Positive for sleep disturbance. Negative for agitation and confusion. The patient is nervous/anxious.      OBJECTIVE:   /68 (BP Location: Right arm, Patient Position: Sitting, Cuff Size: Adult Large)   Pulse 57   Temp 97.5  F (36.4  C) (Temporal)   Resp 18   Ht 1.74 m (5' 8.5\")   Wt 113.7 kg (250 lb " "9.6 oz)   SpO2 97%   BMI 37.55 kg/m   Estimated body mass index is 37.55 kg/m  as calculated from the following:    Height as of this encounter: 1.74 m (5' 8.5\").    Weight as of this encounter: 113.7 kg (250 lb 9.6 oz).  Physical Exam  Constitutional:       General: He is not in acute distress.     Appearance: He is well-developed. He is not diaphoretic.   HENT:      Head: Normocephalic and atraumatic.   Eyes:      General: No scleral icterus.     Conjunctiva/sclera: Conjunctivae normal.   Neck:      Vascular: No carotid bruit.   Cardiovascular:      Rate and Rhythm: Normal rate and regular rhythm.      Pulses: Normal pulses.   Pulmonary:      Effort: Pulmonary effort is normal.      Breath sounds: Wheezing present.   Abdominal:      General: Bowel sounds are normal.      Palpations: Abdomen is soft.      Tenderness: There is no abdominal tenderness.   Musculoskeletal:         General: Tenderness (+ numerous trigger points with tenderness to palpation in mid, upper back and shoulder/neck areas ) present. No deformity. Normal range of motion.      Cervical back: Neck supple.      Right lower le+ Pitting Edema (trace) present.      Left lower le+ Pitting Edema (trace) present.   Lymphadenopathy:      Cervical: No cervical adenopathy.   Skin:     General: Skin is warm and dry.      Findings: No rash.   Neurological:      Mental Status: He is alert and oriented to person, place, and time. Mental status is at baseline.   Psychiatric:         Mood and Affect: Mood normal.         Behavior: Behavior normal.       Recent Labs   Lab Test 10/28/21  0816 20  0913 19  0855   A1C 6.0 5.5 5.5   LDL 89 186* 194*   HDL 48 51 46   TRIG 136 134 108   ALT 10 9 11   CR 0.97 1.07 0.97   GFRESTIMATED 79 69 77   POTASSIUM 4.7 4.5 4.4   TSH 3.11  --   --    WBC 6.5 6.6 5.2   HGB 15.6 16.3 16.3    284 265   ALBUMIN 3.9 4.3 4.0     Hemoglobin A1c is elevated.  LDL is high and not at goal, less than 70.  HDL and " triglycerides are at goal.  ALT is normal.  Creatinine is at baseline.  Potassium is normal.  TSH is normal.  CBC is normal.  Albumin is normal.    ASSESSMENT / PLAN:       ICD-10-CM    1. Heart failure with reduced ejection fraction (H)  I50.20 losartan (COZAAR) 25 MG tablet     furosemide (LASIX) 20 MG tablet     empagliflozin (JARDIANCE) 10 MG TABS tablet      2. Coronary artery disease due to lipid rich plaque  I25.10 clopidogrel (PLAVIX) 75 MG tablet    I25.83 losartan (COZAAR) 25 MG tablet     rosuvastatin (CRESTOR) 20 MG tablet     sotalol (BETAPACE) 80 MG tablet     empagliflozin (JARDIANCE) 10 MG TABS tablet      3. Ischemic heart disease, chronic - EF 45-50% (2022)  I25.9 clopidogrel (PLAVIX) 75 MG tablet     losartan (COZAAR) 25 MG tablet     furosemide (LASIX) 20 MG tablet     empagliflozin (JARDIANCE) 10 MG TABS tablet      4. Paroxysmal supraventricular tachycardia (H)  I47.1 sotalol (BETAPACE) 80 MG tablet      5. Morbid obesity (H)  E66.01       6. Palpitations  R00.2       7. Benign prostatic hyperplasia with nocturia  N40.1 tamsulosin (FLOMAX) 0.4 MG capsule    R35.1       8. Familial hyperlipidemia  E78.49 rosuvastatin (CRESTOR) 20 MG tablet      9. Generalized anxiety disorder  F41.1 sertraline (ZOLOFT) 25 MG tablet      10. Panic attacks  F41.0 sertraline (ZOLOFT) 25 MG tablet      11. Benign essential hypertension  I10 losartan (COZAAR) 25 MG tablet      12. PVC's (premature ventricular contractions)  I49.3 sotalol (BETAPACE) 80 MG tablet      13. Encounter for Medicare annual wellness exam  Z00.00       14. Needs flu shot  Z23 FLU SHOT 65+ (FLUZONE HD)      15. Vaccine counseling  Z71.85       Patient presents for Medicare annual wellness visit as well as follow-up multiple issues    Heart failure with reduced ejection fraction, coronary artery disease, has ischemic heart disease with EF of 45-50%.  Continues with combination of losartan, Lasix.  Also takes Plavix.  Seems to be tolerating  well.  Continue with sotalol due to paroxysmal supraventricular tachycardia.  Refills sent to pharmacy.  Seems to be doing well with current medications.  -Given his heart failure, cardiology had recommended consideration of Jardiance.  We discussed risks and benefits.  No family history of pancreatic or thyroid cancer.  He would like to proceed with prescription as described.  Start 10 mg Jardiance daily.    Obesity, discussed need for reduced oral caloric intake.  Regular exercise.  Reduce carbohydrate intake.  Information printed and reviewed.    Heart palpitations, has history of PSVT.  Continues on sotalol.  Refill sent to pharmacy.    BPH with nocturia, ongoing.  Continues with Flomax.  Seems to be helping.  Tolerating well.  Needs refills.    Familial hyperlipidemia with cholesterol levels not at goal.  Continues on Crestor 20 mg daily.  LDL goal is less than 70.  If lifestyle modifications are not adequate to improve cholesterol levels we will need to consider medication changes.    Panic attacks and general anxiety disorder.  Ongoing.  Zoloft seems to be helping.  Needs refills.    Hypertension, blood pressure is currently well controlled.  Continues with losartan.  Needs refills.    PVCs.  Continues with sotalol as noted above.  Refill sent to pharmacy.    Vaccine counseling completed.  Flu shot today.  Encouraged consideration of shingles vaccination, COVID shot.    Patient has been advised of split billing requirements and indicates understanding: Yes      COUNSELING:  Reviewed preventive health counseling, as reflected in patient instructions  Special attention given to:       Consider AAA screening for ages 65-75 and smoking history       Regular exercise       Healthy diet/nutrition       Vision screening       Hearing screening       Dental care       Bladder control       Fall risk prevention       Immunizations    Estimated body mass index is 37.55 kg/m  as calculated from the following:    Height  "as of this encounter: 1.74 m (5' 8.5\").    Weight as of this encounter: 113.7 kg (250 lb 9.6 oz).    Weight management plan: Discussed healthy diet and exercise guidelines    He reports that he quit smoking about 22 years ago. His smoking use included pipe and cigarettes. His smokeless tobacco use includes chew.      Appropriate preventive services were discussed with this patient, including applicable screening as appropriate for cardiovascular disease, diabetes, osteopenia/osteoporosis, and glaucoma.  As appropriate for age/gender, discussed screening for colorectal cancer, prostate cancer, breast cancer, and cervical cancer. Checklist reviewing preventive services available has been given to the patient.    Reviewed patients plan of care and provided an AVS. The Complex Care Plan (for patients with higher acuity and needing more deliberate coordination of services) for Héctor meets the Care Plan requirement. This Care Plan has been established and reviewed with the Patient and spouse.    Counseling Resources:  ATP IV Guidelines  Pooled Cohorts Equation Calculator  Breast Cancer Risk Calculator  Breast Cancer: Medication to Reduce Risk  FRAX Risk Assessment  ICSI Preventive Guidelines  Dietary Guidelines for Americans, 2010  USDA's MyPlate  ASA Prophylaxis  Lung CA Screening    Edenilson Adams MD  [facility] AND [facility]    Identified Health Risks:  Answers for HPI/ROS submitted by the patient on 10/28/2022  If you checked off any problems, how difficult have these problems made it for you to do your work, take care of things at home, or get along with other people?: Somewhat difficult  PHQ9 TOTAL SCORE: 11  RYANNE 7 TOTAL SCORE: 7      "

## 2022-10-28 NOTE — PROGRESS NOTES
The patient was provided with suggestions to help him develop a healthy physical lifestyle.  He is at risk for lack of exercise and has been provided with information to increase physical activity for the benefit of his well-being.  The patient was counseled and encouraged to consider modifying their diet and eating habits. He was provided with information on recommended healthy diet options.  Information on urinary incontinence and treatment options given to patient.  The patient was provided with suggestions to help him develop a healthy emotional lifestyle.  The patient s PHQ-9 score is consistent with moderate depression. He was provided with information regarding depression and was advised to schedule a follow up appointment in 4-12 weeks to further address this issue.

## 2022-10-28 NOTE — NURSING NOTE
"Chief Complaint   Patient presents with     Medicare Visit     Annual well visit       Initial /68 (BP Location: Right arm, Patient Position: Sitting, Cuff Size: Adult Large)   Pulse 57   Temp 97.5  F (36.4  C) (Temporal)   Resp 18   Wt 113.7 kg (250 lb 9.6 oz)   SpO2 97%   BMI 36.49 kg/m   Estimated body mass index is 36.49 kg/m  as calculated from the following:    Height as of 10/28/21: 1.765 m (5' 9.49\").    Weight as of this encounter: 113.7 kg (250 lb 9.6 oz).     Medication Reconciliation: complete      FOOD SECURITY SCREENING QUESTIONS:    The next two questions are to help us understand your food security.  If you are feeling you need any assistance in this area, we have resources available to support you today.    Hunger Vital Signs:  Within the past 12 months we worried whether our food would run out before we got money to buy more. Never  Within the past 12 months the food we bought just didn't last and we didn't have money to get more. Never      Advance care plan reviewed      Zoila Espinoza LPN on 10/28/2022 at 9:39 AM      "

## 2022-10-28 NOTE — PATIENT INSTRUCTIONS
Patient Education   Personalized Prevention Plan  You are due for the preventive services outlined below.  Your care team is available to assist you in scheduling these services.  If you have already completed any of these items, please share that information with your care team to update in your medical record.  Health Maintenance Due   Topic Date Due    Hepatitis B Vaccine (1 of 3 - 3-dose series) Never done    Zoster (Shingles) Vaccine (1 of 2) Never done    COVID-19 Vaccine (2 - Booster for Nadia series) 12/30/2021    Flu Vaccine (1) 09/01/2022     Preventive Health Recommendations  See your health care provider every year to  Review health changes.   Discuss preventive care.    Review your medicines if your doctor has prescribed any.  Talk with your health care provider about whether you should have a test to screen for prostate cancer (PSA).  Every 3 years, have a diabetes test (fasting glucose). If you are at risk for diabetes, you should have this test more often.  Every 5 years, have a cholesterol test. Have this test more often if you are at risk for high cholesterol or heart disease.   Every 10 years, have a colonoscopy. Or, have a yearly FIT test (stool test). These exams will check for colon cancer.  Talk to with your health care provider about screening for Abdominal Aortic Aneurysm if you have a family history of AAA or have a history of smoking.    Shots:   Get a flu shot each year.   Get a tetanus shot every 10 years.   Talk to your doctor about your pneumonia vaccines. There are now two you should receive - Pneumovax (PPSV 23) and Prevnar (PCV 13).  Talk to your pharmacist about a shingles vaccine.   Talk to your doctor about the hepatitis B vaccine.    Nutrition:   Eat at least 5 servings of fruits and vegetables each day.   Eat whole-grain bread, whole-wheat pasta and brown rice instead of white grains and rice.   Get adequate Calcium and Vitamin D.     Lifestyle  Exercise for at least 150  minutes a week (30 minutes a day, 5 days a week). This will help you control your weight and prevent disease.   Limit alcohol to one drink per day.   No smoking.   Wear sunscreen to prevent skin cancer.   See your dentist every six months for an exam and cleaning.   See your eye doctor every 1 to 2 years to screen for conditions such as glaucoma, macular degeneration and cataracts.    Personalized Prevention Plan  You are due for the preventive services outlined below.  Your care team is available to assist you in scheduling these services.  If you have already completed any of these items, please share that information with your care team to update in your medical record.  Health Maintenance   Topic Date Due    HEPATITIS B IMMUNIZATION (1 of 3 - 3-dose series) Never done    ZOSTER IMMUNIZATION (1 of 2) Never done    COVID-19 Vaccine (2 - Booster for Nadia series) 12/30/2021    INFLUENZA VACCINE (1) 09/01/2022    PHQ-9  04/28/2023    MEDICARE ANNUAL WELLNESS VISIT  10/28/2023    FALL RISK ASSESSMENT  10/28/2023    DTAP/TDAP/TD IMMUNIZATION (2 - Td or Tdap) 12/11/2023    COLORECTAL CANCER SCREENING  11/11/2024    LIPID  10/28/2026    ADVANCE CARE PLANNING  10/29/2026    Pneumococcal Vaccine: 65+ Years  Completed    AORTIC ANEURYSM SCREENING (SYSTEM ASSIGNED)  Completed    HEPATITIS C SCREENING  Addressed    DEPRESSION ACTION PLAN  Addressed    IPV IMMUNIZATION  Aged Out    MENINGITIS IMMUNIZATION  Aged Out     Your Health Risk Assessment indicates you feel you are not in good health    A healthy lifestyle helps keep the body fit and the mind alert. It helps protect you from disease, helps you fight disease, and helps prevent chronic disease (disease that doesn't go away) from getting worse. This is important as you get older and begin to notice twinges in muscles and joints and a decline in the strength and stamina you once took for granted. A healthy lifestyle includes good healthcare, good nutrition, weight  control, recreation, and regular exercise. Avoid harmful substances and do what you can to keep safe. Another part of a healthy lifestyle is stay mentally active and socially involved.    Good healthcare   Have a wellness visit every year.   If you have new symptoms, let us know right away. Don't wait until the next checkup.   Take medicines exactly as prescribed and keep your medicines in a safe place. Tell us if your medicine causes problems.   Healthy diet and weight control   Eat 3 or 4 small, nutritious, low-fat, high-fiber meals a day. Include a variety of fruits, vegetables, and whole-grain foods.   Make sure you get enough calcium in your diet. Calcium, vitamin D, and exercise help prevent osteoporosis (bone thinning).   If you live alone, try eating with others when you can. That way you get a good meal and have company while you eat it.   Try to keep a healthy weight. If you eat more calories than your body uses for energy, it will be stored as fat and you will gain weight.     Recreation   Recreation is not limited to sports and team events. It includes any activity that provides relaxation, interest, enjoyment, and exercise. Recreation provides an outlet for physical, mental, and social energy. It can give a sense of worth and achievement. It can help you stay healthy.    Mental Exercise and Social Involvement  Mental and emotional health is as important as physical health. Keep in touch with friends and family. Stay as active as possible. Continue to learn and challenge yourself.   Things you can do to stay mentally active are:  Learn something new, like a foreign language or musical instrument.   Play SCRABBLE or do crossword puzzles. If you cannot find people to play these games with you at home, you can play them with others on your computer through the Internet.   Join a games club--anything from card games to chess or checkers or lawn bowling.   Start a new hobby.   Go back to school.   Volunteer.    Read.   Keep up with world events.    Exercise for a Healthier Heart  You may wonder how you can improve the health of your heart. If you re thinking about exercise, you re on the right track. You don t need to become an athlete. But you do need a certain amount of brisk exercise to help strengthen your heart. If you have been diagnosed with a heart condition, your healthcare provider may advise exercise to help stabilize your condition. To help make exercise a habit, choose safe, fun activities.      Exercise with a friend. When activity is fun, you're more likely to stick with it.   Before you start  Check with your healthcare provider before starting an exercise program. This is especially important if you have not been active for a while. It's also important if you have a long-term (chronic) health problem such as heart disease, diabetes, or obesity. Or if you are at high risk for having these problems.   Why exercise?  Exercising regularly offers many healthy rewards. It can help you do all of the following:   Improve your blood cholesterol level to help prevent further heart trouble  Lower your blood pressure to help prevent a stroke or heart attack  Control diabetes, or reduce your risk of getting this disease  Improve your heart and lung function  Reach and stay at a healthy weight  Make your muscles stronger so you can stay active  Prevent falls and fractures by slowing the loss of bone mass (osteoporosis)  Manage stress better  Reduce your blood pressure  Improve your sense of self and your body image  Exercise tips    Ease into your routine. Set small goals. Then build on them. If you are not sure what your activity level should be, talk with your healthcare provider first before starting an exercise routine.  Exercise on most days. Aim for a total of 150 minutes (2 hours and 30 minutes) or more of moderate-intensity aerobic activity each week. Or 75 minutes (1 hour and 15 minutes) or more of  vigorous-intensity aerobic activity each week. Or try for a combination of both. Moderate activity means that you breathe heavier and your heart rate increases but you can still talk. Think about doing 40 minutes of moderate exercise, 3 to 4 times a week. For best results, activity should last for about 40 minutes to lower blood pressure and cholesterol. It's OK to work up to the 40-minute period over time. Examples of moderate-intensity activity are walking 1 mile in 15 minutes. Or doing 30 to 45 minutes of yard work.  Step up your daily activity level.  Along with your exercise program, try being more active the whole day. Walk instead of drive. Or park further away so that you take more steps each day. Do more household tasks or yard work. You may not be able to meet the advised mount of physical activity. But doing some moderate- or vigorous-intensity aerobic activity can help reduce your risk for heart disease. Your healthcare provider can help you figure out what is best for you.  Choose 1 or more activities you enjoy.  Walking is one of the easiest things you can do. You can also try swimming, riding a bike, dancing, or taking an exercise class.    When to call your healthcare provider  Call your healthcare provider if you have any of these:   Chest pain or feel dizzy or lightheaded  Burning, tightness, pressure, or heaviness in your chest, neck, shoulders, back, or arms  Abnormal shortness of breath  More joint or muscle pain  A very fast or irregular heartbeat (palpitations)  [employer] last reviewed this educational content on 7/1/2019 2000-2021 [employer]. All rights reserved. This information is not intended as a substitute for professional medical care. Always follow your healthcare professional's instructions.          Understanding USDA MyPlate  The USDA has guidelines to help you make healthy food choices. These are called MyPlate. MyPlate shows the food groups that make up healthy  meals using the image of a place setting. Before you eat, think about the healthiest choices for what to put on your plate or in your cup or bowl. To learn more about building a healthy plate, visit www.choosemyplate.gov.    The food groups  Fruits. Any fruit or 100% fruit juice counts as part of the Fruit Group. Fruits may be fresh, canned, frozen, or dried, and may be whole, cut-up, or pureed. Make 1/2 of your plate fruits and vegetables.  Vegetables. Any vegetable or 100% vegetable juice counts as a member of the Vegetable Group. Vegetables may be fresh, frozen, canned, or dried. They can be served raw or cooked and may be whole, cut-up, or mashed. Make 1/2 of your plate fruits and vegetables.  Grains. All foods made from grains are part of the Grains Group. These include wheat, rice, oats, cornmeal, and barley. Grains are often used to make foods such as bread, pasta, oatmeal, cereal, tortillas, and grits. Grains should be no more than 1/4 of your plate. At least half of your grains should be whole grains.  Protein. This group includes meat, poultry, seafood, beans and peas, eggs, processed soy products (such as tofu), nuts (including nut butters), and seeds. Make protein choices no more than 1/4 of your plate. Meat and poultry choices should be lean or low fat.  Dairy. The Dairy Group includes all fluid milk products and foods made from milk that contain calcium, such as yogurt and cheese. (Foods that have little calcium, such as cream, butter, and cream cheese, are not part of this group.) Most dairy choices should be low-fat or fat-free.  Oils. Oils aren't a food group, but they do contain essential nutrients. However it's important to watch your intake of oils. These are fats that are liquid at room temperature. They include canola, corn, olive, soybean, vegetable, and sunflower oil. Foods that are mainly oil include mayonnaise, certain salad dressings, and soft margarines. You likely already get your daily  oil allowance from the foods you eat.  Things to limit  Eating healthy also means limiting these things in your diet:     Salt (sodium). Many processed foods have a lot of sodium. To keep sodium intake down, eat fresh vegetables, meats, poultry, and seafood when possible. Purchase low-sodium, reduced-sodium, or no-salt-added food products at the store. And don't add salt to your meals at home. Instead, season them with herbs and spices such as dill, oregano, cumin, and paprika. Or try adding flavor with lemon or lime zest and juice.  Saturated fat. Saturated fats are most often found in animal products such as beef, pork, and chicken. They are often solid at room temperature, such as butter. To reduce your saturated fat intake, choose leaner cuts of meat and poultry. And try healthier cooking methods such as grilling, broiling, roasting, or baking. For a simple lower-fat swap, use plain nonfat yogurt instead of mayonnaise when making potato salad or macaroni salad.  Added sugars. These are sugars added to foods. They are in foods such as ice cream, candy, soda, fruit drinks, sports drinks, energy drinks, cookies, pastries, jams, and syrups. Cut down on added sugars by sharing sweet treats with a family member or friend. You can also choose fruit for dessert, and drink water or other unsweetened beverages.     [employer] last reviewed this educational content on 6/1/2020 2000-2021 [employer]. All rights reserved. This information is not intended as a substitute for professional medical care. Always follow your healthcare professional's instructions.          Urinary Incontinence (Male)    Urinary incontinence means not being able to control the release of urine from the bladder.   Causes  Common causes of urinary incontinence in men include:  Infection  Certain medicines  Aging  Poor pelvic muscle tone  Bladder spasms  Obesity  Trouble urinating and fully emptying the bladder (urinary  retention)  Other things that can cause incontinence are:   Nervous system diseases  Diabetes  Sleep apnea  Urinary tract infections  Prostate surgery  Pelvic injury  Constipation and smoking have also been identified as risk factors.   Symptoms  Urge incontinence (overactive bladder). This is a sudden urge to urinate. It occurs even though there may not be much urine in the bladder. The need to urinate often during the night is common. It's due to bladder spasms.  Stress incontinence. This is urine leakage that you can't control. It can occur with sneezing, coughing, and other actions that put stress on the bladder.    Treatment  Treatment depends on what is causing the condition. Bladder infections are treated with antibiotics. Urinary retention is treated with a bladder catheter.   Home care  Follow these guidelines when caring for yourself at home:  Don't have any foods and drinks that may irritate the bladder. This includes:  Chocolate  Alcohol  Caffeine  Carbonated drinks  Acidic fruits and juices  Limit fluids to 6 to 8 cups a day.  Lose weight if you are overweight. This will reduce your symptoms.  If advised, do regular pelvic muscle-strengthening exercises such as Kegel exercises.  If needed, wear absorbent pads to catch urine. Change the pads often. This is for good hygiene and to prevent skin and bladder infections.  Bathe daily for good hygiene.  If an antibiotic was prescribed to treat a bladder infection, take it until it's finished. Keep taking it even if you are feeling better. This is to make sure your infection has cleared.  If a catheter was left in place, keep bacteria from getting into the collection bag. Don't disconnect the catheter from the collection bag.  Use a leg band to secure the catheter drainage tube, so it does not pull on the catheter. Drain the collection bag when it becomes full. To do this, use the drain spout at the bottom of the bag. Don't disconnect the bag from the  catheter.  Don't pull on or try to remove a catheter. The catheter must be removed by a healthcare provider.  If you smoke, stop. Ask your provider for help if you can't do this on your own.  Follow-up care  Follow up with your healthcare provider, or as advised.  When to get medical advice  Call your healthcare provider right away if any of these occur:  Fever over 100.4 F (38 C), or as directed by your provider  Bladder pain or fullness  Belly swelling, nausea, or vomiting  Back pain  Weakness, dizziness, or fainting  If a catheter was left in place, return if:  The catheter falls out  The catheter stops draining for 6 hours  Your urine gets cloudy or smells bad  [employer] last reviewed this educational content on 1/1/2020 2000-2021 [employer]. All rights reserved. This information is not intended as a substitute for professional medical care. Always follow your healthcare professional's instructions.        Your Health Risk Assessment indicates you feel you are not in good emotional health.    Recreation   Recreation is not limited to sports and team events. It includes any activity that provides relaxation, interest, enjoyment, and exercise. Recreation provides an outlet for physical, mental, and social energy. It can give a sense of worth and achievement. It can help you stay healthy.    Mental Exercise and Social Involvement  Mental and emotional health is as important as physical health. Keep in touch with friends and family. Stay as active as possible. Continue to learn and challenge yourself.   Things you can do to stay mentally active are:  Learn something new, like a foreign language or musical instrument.   Play SCRABBLE or do crossword puzzles. If you cannot find people to play these games with you at home, you can play them with others on your computer through the Internet.   Join a games club--anything from card games to chess or checkers or lawn bowling.   Start a new hobby.   Go  "back to school.   Volunteer.   Read.   Keep up with world events.    Depression and Suicide in Older Adults    Nearly 2 million older Americans have some type of depression. Some of them even take their own lives. Yet depression among older adults is often ignored. Learn the warning signs. You may help spare a loved one needless pain. You may also save a life.   What is depression?  Depression is a common and serious illness that affects the way you think and feel. It is not a normal part of aging, nor is it a sign of weakness, a character flaw, or something you can snap out of. Most people with depression need treatment to get better. The most common symptom is a feeling of deep sadness. People who are depressed also may seem tired and listless. And nothing seems to give them pleasure. It s normal to grieve or be sad sometimes. But sadness lessens or passes with time. Depression rarely goes away or improves on its own. A person with clinical depression can't \"snap out of it.\" Other symptoms of depression are:   Sleeping more or less than normal  Eating more or less than normal  Having headaches, stomachaches, or other pains that don t go away  Feeling nervous,  empty,  or worthless  Crying a great deal  Thinking or talking about suicide or death  Loss of interest in activities previously enjoyed  Social isolation  Feeling confused or forgetful  What causes it?  The causes of depression aren t fully known. But it is thought to result from a complex blend of these factors:   Biochemistry. Certain chemicals in the brain play a role.  Genes. Depression does run in families.  Life stress. Life stresses can also trigger depression in some people. Older adults often face many stressors, such as death of friends or a spouse, health problems, and financial concerns.  Chronic conditions. This includes conditions such as diabetes, heart disease, or cancer. These can cause symptoms of depression. Medicine side effects can " cause changes in thoughts and behaviors.  How you can help  Often, depressed people may not want to ask for help. When they do, they may be ignored. Or, they may receive the wrong treatment. You can help by showing parents and older friends love and support. If they seem depressed, don t lecture the person, ignore the symptoms, or discount the symptoms as a  normal  part of aging -which they are not. Get involved, listen, and show interest and support.   Help them understand that depression is a treatable illness. Tell them you can help them find the right treatment. Offer to go to their healthcare provider's appointment with them for support when the symptoms are discussed. With their approval, contact a local mental health center, social service agency, or hospital about services.   You can be an advocate for him or her at healthcare appointments. Many older adults have chronic illnesses that can cause symptoms of depression. Medicine side effects can change thoughts and behaviors. You can help make sure that the healthcare provider looks at all of these factors. He or she should refer your family member or friend to a mental healthcare provider when needed. in some cases, untreated depression can lead to a misdiagnosis. A person may be diagnosed with a brain disorder such as dementia. If the healthcare provider does not take the issue of depression seriously, help your family member or friend to find another provider.   Don't be afraid to ask  If you think an older person you care about could be suicidal, ask,  Have you thought about suicide?  Most people will tell you the truth. If they say  yes,  they may already have a plan for how and when they will attempt it. Find out as much as you can. The more detailed the plan, and the easier it is to carry out, the more danger the person is in right now. Tell the person you are there for them and do not want them to harm him or herself. Don't wait to get help for the  person. Call the person's healthcare provider, local hospital, or emergency services.   To learn more  National Suicide Prevention Lifeline (crisis hotline) 078-774-VRPQ (535-380-9507)  National [place] of Mental Xihxdk173-706-8154ail.[facility].nih.gov  National [place] on Mental Ybkigrw885-588-1161xwu.[place].org  Mental Health Vjtznxg705-536-4559zny.[facility].org  National Suicide Ksnysoe115-DDXYGZT (850-114-3377)    Call 911  Never leave the person alone. A person who is actively suicidal needs psychiatric care right away. They will need constant supervision. Never leave the person out of sight. Call 911 or the national 24-hour suicide crisis hotline at 214-554-MDKH (963-162-3831). You can also take the person to the closest emergency room.   [employer] last reviewed this educational content on 5/1/2020 2000-2021 [employer]. All rights reserved. This information is not intended as a substitute for professional medical care. Always follow your healthcare professional's instructions.           Immunization History   Administered Date(s) Administered    COVID-19,PF,Nadia 11/04/2021    Flu, Unspecified 11/23/2009, 12/09/2010, 10/05/2017    Influenza (High Dose) 3 valent vaccine 09/08/2018, 09/24/2019, 09/17/2020    Influenza (IIV3) PF 10/24/2011, 09/24/2012, 10/08/2013, 10/09/2013    Influenza Vaccine IM > 6 months Valent IIV4 (Alfuria,Fluzone) 10/20/2014, 10/23/2015, 10/12/2016    Influenza, Quad, High Dose, Pf, 65yr+ (Fluzone HD) 09/17/2020, 09/14/2021    Influenza, Whole Virus 12/09/2010    Pneumo Conj 13-V (2010&after) 11/22/2017    Pneumococcal 23 valent 03/23/2010, 11/26/2019    TDAP Vaccine (Boostrix) 12/11/2013      Consider:   -- Yearly BIVALENT - COVID-19 Vaccination shot (Fall 2022 = Omicron 4 and 5/COVID) - Anytime at your convenience.     -- Consider Annual Flu / Influenza vaccination - Every Fall (Starting about October 1st)    -- Pneumonia / Pneumococcal PCV vaccines are done / completed.      -- Flu shot today.       -- Get the new shingles shot (2 in series) (Shingrix) - from one of the local pharmacies, at your convenience. -- Check cost/coverage.   -- or -- If these are very expensive, go to the Local Public Health Department     [facility] -- [address] [address]    ---- Shot clinic is the FIRST Thursday of Each Month -- from 2 to 6 PM, by Appointment only.     Call for an appointment -- [phone]      Blood pressure is well controlled.     Medications refilled.   Last labs are stable.     Start Jardiance 10 mg once daily - for Heart. At [facility].     Return in approximately 5 month(s), or sooner as needed for follow-up with Dr. Adams.  - follow-up Heart Failure    Clinic : [phone]  Appointment line: [phone]

## 2022-12-12 DIAGNOSIS — I47.10 PAROXYSMAL SUPRAVENTRICULAR TACHYCARDIA (H): ICD-10-CM

## 2022-12-12 DIAGNOSIS — I25.10 CORONARY ARTERY DISEASE DUE TO LIPID RICH PLAQUE: ICD-10-CM

## 2022-12-12 DIAGNOSIS — I49.3 PVC'S (PREMATURE VENTRICULAR CONTRACTIONS): ICD-10-CM

## 2022-12-12 DIAGNOSIS — I25.83 CORONARY ARTERY DISEASE DUE TO LIPID RICH PLAQUE: ICD-10-CM

## 2022-12-14 RX ORDER — SOTALOL HYDROCHLORIDE 80 MG/1
TABLET ORAL
Qty: 180 TABLET | Refills: 1 | Status: SHIPPED | OUTPATIENT
Start: 2022-12-14 | End: 2022-12-22

## 2022-12-14 NOTE — TELEPHONE ENCOUNTER
's [facility] sent Rx request for the following:      Requested Prescriptions   Pending Prescriptions Disp Refills     sotalol (BETAPACE) 80 MG tablet [Pharmacy Med Name: SOTALOL 80 MG TABLET] 180 tablet 0     Sig: TAKE 1 TABLET BY MOUTH TWICE DAILY     Sig - Route: Take 1 tablet (80 mg) by mouth every morning AND 0.5 tablets (40 mg) every evening. -- Rx by Cardiology - Oral   Class: Historical     Last Office Visit:              10/28/22   Future Office visit:           3/30/23    Per LOV note:  The patient s PHQ-9 score is consistent with moderate depression. He was provided with information regarding depression and was advised to schedule a follow up appointment in 4-12 weeks to further address this issue.    Nancy Barahona RN .............. 12/14/2022  4:14 PM

## 2022-12-21 ENCOUNTER — MYC MEDICAL ADVICE (OUTPATIENT)
Dept: INTERNAL MEDICINE | Facility: OTHER | Age: 72
End: 2022-12-21

## 2022-12-21 DIAGNOSIS — F41.0 PANIC ATTACKS: ICD-10-CM

## 2022-12-21 DIAGNOSIS — I50.20 HEART FAILURE WITH REDUCED EJECTION FRACTION (H): ICD-10-CM

## 2022-12-21 DIAGNOSIS — I25.9 ISCHEMIC HEART DISEASE, CHRONIC: ICD-10-CM

## 2022-12-21 DIAGNOSIS — F41.1 GENERALIZED ANXIETY DISORDER: ICD-10-CM

## 2022-12-21 DIAGNOSIS — I25.10 CORONARY ARTERY DISEASE DUE TO LIPID RICH PLAQUE: ICD-10-CM

## 2022-12-21 DIAGNOSIS — I25.83 CORONARY ARTERY DISEASE DUE TO LIPID RICH PLAQUE: ICD-10-CM

## 2022-12-21 DIAGNOSIS — Z79.899 CHRONIC PRESCRIPTION BENZODIAZEPINE USE: ICD-10-CM

## 2022-12-21 DIAGNOSIS — I49.3 PVC'S (PREMATURE VENTRICULAR CONTRACTIONS): ICD-10-CM

## 2022-12-21 DIAGNOSIS — I47.10 PAROXYSMAL SUPRAVENTRICULAR TACHYCARDIA (H): ICD-10-CM

## 2022-12-21 DIAGNOSIS — I10 BENIGN ESSENTIAL HYPERTENSION: ICD-10-CM

## 2022-12-22 RX ORDER — SOTALOL HYDROCHLORIDE 80 MG/1
80 TABLET ORAL 2 TIMES DAILY
Qty: 180 TABLET | Refills: 1 | Status: CANCELLED | OUTPATIENT
Start: 2022-12-22

## 2022-12-22 NOTE — TELEPHONE ENCOUNTER
sotolol 80mg X 2  Losartan 25mg  Finasteride 5mg  Sertraline 50mg X2  Clopidogrel 75mg  furosemide 20mg  alprazolam .25mg

## 2022-12-24 RX ORDER — ALPRAZOLAM 0.25 MG
TABLET ORAL
Qty: 40 TABLET | Refills: 0 | Status: SHIPPED | OUTPATIENT
Start: 2022-12-24 | End: 2023-03-30

## 2022-12-24 RX ORDER — SOTALOL HYDROCHLORIDE 80 MG/1
80 TABLET ORAL 2 TIMES DAILY
Qty: 180 TABLET | Refills: 1 | Status: SHIPPED | OUTPATIENT
Start: 2022-12-24 | End: 2023-07-07

## 2022-12-24 RX ORDER — SERTRALINE HYDROCHLORIDE 25 MG/1
25-50 TABLET, FILM COATED ORAL DAILY
Qty: 180 TABLET | Refills: 1 | Status: SHIPPED | OUTPATIENT
Start: 2022-12-24 | End: 2023-03-30

## 2022-12-24 RX ORDER — FUROSEMIDE 20 MG
40 TABLET ORAL DAILY
Qty: 90 TABLET | Refills: 1 | Status: SHIPPED | OUTPATIENT
Start: 2022-12-24 | End: 2023-01-02

## 2022-12-24 RX ORDER — CLOPIDOGREL BISULFATE 75 MG/1
TABLET ORAL
Qty: 90 TABLET | Refills: 3 | Status: SHIPPED | OUTPATIENT
Start: 2022-12-24 | End: 2023-10-31

## 2022-12-24 RX ORDER — LOSARTAN POTASSIUM 25 MG/1
25 TABLET ORAL DAILY
Qty: 90 TABLET | Refills: 3 | Status: SHIPPED | OUTPATIENT
Start: 2022-12-24 | End: 2023-10-31

## 2022-12-29 ENCOUNTER — TELEPHONE (OUTPATIENT)
Dept: INTERNAL MEDICINE | Facility: OTHER | Age: 72
End: 2022-12-29

## 2022-12-29 DIAGNOSIS — I25.9 ISCHEMIC HEART DISEASE, CHRONIC: ICD-10-CM

## 2022-12-29 DIAGNOSIS — R35.1 BENIGN PROSTATIC HYPERPLASIA WITH NOCTURIA: ICD-10-CM

## 2022-12-29 DIAGNOSIS — N40.1 BENIGN PROSTATIC HYPERPLASIA WITH NOCTURIA: ICD-10-CM

## 2022-12-29 DIAGNOSIS — I50.20 HEART FAILURE WITH REDUCED EJECTION FRACTION (H): ICD-10-CM

## 2022-12-29 NOTE — TELEPHONE ENCOUNTER
Héctor was picking up medications and asked if we could retrieve prescriptions for tamsulosin and finasteride from [facility]. Yes on tamsulosin, but finasteride was too old. Review of chart shows it was discontinued as not taking. Is he taking both?

## 2023-01-02 RX ORDER — FINASTERIDE 5 MG/1
5 TABLET, FILM COATED ORAL DAILY
Qty: 90 TABLET | Refills: 3 | Status: SHIPPED | OUTPATIENT
Start: 2023-01-02 | End: 2023-10-31

## 2023-01-02 RX ORDER — FUROSEMIDE 20 MG
40 TABLET ORAL DAILY
Qty: 180 TABLET | Refills: 4 | Status: SHIPPED | OUTPATIENT
Start: 2023-01-02 | End: 2023-03-30

## 2023-01-04 ENCOUNTER — MYC MEDICAL ADVICE (OUTPATIENT)
Dept: INTERNAL MEDICINE | Facility: OTHER | Age: 73
End: 2023-01-04

## 2023-01-04 NOTE — TELEPHONE ENCOUNTER
Called [facility] in [place] and verified name and date of birth of patient.   Notified of need to transfer prescription to [facility] and [facility] states they will do so.  Angeles Alcantara RN on 1/4/2023 at 2:10 PM

## 2023-02-23 DIAGNOSIS — I25.83 CORONARY ARTERY DISEASE DUE TO LIPID RICH PLAQUE: ICD-10-CM

## 2023-02-23 DIAGNOSIS — I50.20 HEART FAILURE WITH REDUCED EJECTION FRACTION (H): ICD-10-CM

## 2023-02-23 DIAGNOSIS — I25.9 ISCHEMIC HEART DISEASE, CHRONIC: ICD-10-CM

## 2023-02-23 DIAGNOSIS — I25.10 CORONARY ARTERY DISEASE DUE TO LIPID RICH PLAQUE: ICD-10-CM

## 2023-02-23 NOTE — TELEPHONE ENCOUNTER
[facility] sent Rx request for the following:    Jardiance 10 mg tablet  Last Prescription Date:   10/28/22  Last Fill Qty/Refills:         90, R-1    Last Office Visit:              10/28/22   Future Office visit:           3/30/23  Angeles Alcantara RN on 2/23/2023 at 3:57 PM

## 2023-02-24 RX ORDER — EMPAGLIFLOZIN 10 MG/1
TABLET, FILM COATED ORAL
Qty: 90 TABLET | Refills: 1 | Status: SHIPPED | OUTPATIENT
Start: 2023-02-24 | End: 2023-03-30

## 2023-03-30 ENCOUNTER — OFFICE VISIT (OUTPATIENT)
Dept: INTERNAL MEDICINE | Facility: OTHER | Age: 73
End: 2023-03-30
Attending: INTERNAL MEDICINE
Payer: COMMERCIAL

## 2023-03-30 VITALS
BODY MASS INDEX: 36.22 KG/M2 | SYSTOLIC BLOOD PRESSURE: 118 MMHG | HEART RATE: 62 BPM | TEMPERATURE: 97.6 F | RESPIRATION RATE: 20 BRPM | DIASTOLIC BLOOD PRESSURE: 70 MMHG | HEIGHT: 70 IN | OXYGEN SATURATION: 97 %

## 2023-03-30 DIAGNOSIS — F33.42 RECURRENT MAJOR DEPRESSION IN COMPLETE REMISSION (H): ICD-10-CM

## 2023-03-30 DIAGNOSIS — F41.0 PANIC ATTACKS: ICD-10-CM

## 2023-03-30 DIAGNOSIS — I50.20 HEART FAILURE WITH REDUCED EJECTION FRACTION (H): ICD-10-CM

## 2023-03-30 DIAGNOSIS — I50.22 CHRONIC SYSTOLIC HEART FAILURE (H): ICD-10-CM

## 2023-03-30 DIAGNOSIS — E78.49 FAMILIAL HYPERLIPIDEMIA: ICD-10-CM

## 2023-03-30 DIAGNOSIS — I10 BENIGN ESSENTIAL HYPERTENSION: Primary | ICD-10-CM

## 2023-03-30 DIAGNOSIS — I47.10 PAROXYSMAL SUPRAVENTRICULAR TACHYCARDIA (H): ICD-10-CM

## 2023-03-30 DIAGNOSIS — Z12.5 ENCOUNTER FOR SCREENING FOR MALIGNANT NEOPLASM OF PROSTATE: ICD-10-CM

## 2023-03-30 DIAGNOSIS — E66.01 MORBID OBESITY (H): ICD-10-CM

## 2023-03-30 DIAGNOSIS — Z79.899 CHRONIC PRESCRIPTION BENZODIAZEPINE USE: ICD-10-CM

## 2023-03-30 DIAGNOSIS — I25.10 CORONARY ARTERY DISEASE DUE TO LIPID RICH PLAQUE: ICD-10-CM

## 2023-03-30 DIAGNOSIS — I25.2 HISTORY OF ST ELEVATION MYOCARDIAL INFARCTION (STEMI): ICD-10-CM

## 2023-03-30 DIAGNOSIS — R73.9 ELEVATED RANDOM BLOOD GLUCOSE LEVEL: ICD-10-CM

## 2023-03-30 DIAGNOSIS — I25.9 ISCHEMIC HEART DISEASE, CHRONIC: ICD-10-CM

## 2023-03-30 DIAGNOSIS — E53.8 VITAMIN B12 DEFICIENCY: ICD-10-CM

## 2023-03-30 DIAGNOSIS — I25.83 CORONARY ARTERY DISEASE DUE TO LIPID RICH PLAQUE: ICD-10-CM

## 2023-03-30 DIAGNOSIS — F41.1 GENERALIZED ANXIETY DISORDER: ICD-10-CM

## 2023-03-30 LAB
ALBUMIN SERPL BCG-MCNC: 3.8 G/DL (ref 3.5–5.2)
ALP SERPL-CCNC: 60 U/L (ref 40–129)
ALT SERPL W P-5'-P-CCNC: 11 U/L (ref 10–50)
ANION GAP SERPL CALCULATED.3IONS-SCNC: 7 MMOL/L (ref 7–15)
AST SERPL W P-5'-P-CCNC: 18 U/L (ref 10–50)
BILIRUB SERPL-MCNC: 0.4 MG/DL
BUN SERPL-MCNC: 16.5 MG/DL (ref 8–23)
CALCIUM SERPL-MCNC: 9.4 MG/DL (ref 8.8–10.2)
CHLORIDE SERPL-SCNC: 99 MMOL/L (ref 98–107)
CHOLEST SERPL-MCNC: 276 MG/DL
CREAT SERPL-MCNC: 1.05 MG/DL (ref 0.67–1.17)
DEPRECATED HCO3 PLAS-SCNC: 30 MMOL/L (ref 22–29)
ERYTHROCYTE [DISTWIDTH] IN BLOOD BY AUTOMATED COUNT: 13.1 % (ref 10–15)
GFR SERPL CREATININE-BSD FRML MDRD: 76 ML/MIN/1.73M2
GLUCOSE SERPL-MCNC: 93 MG/DL (ref 70–99)
HBA1C MFR BLD: 5.5 % (ref 4–6.2)
HCT VFR BLD AUTO: 47 % (ref 40–53)
HDLC SERPL-MCNC: 45 MG/DL
HGB BLD-MCNC: 16.5 G/DL (ref 13.3–17.7)
HOLD SPECIMEN: NORMAL
LDLC SERPL CALC-MCNC: 201 MG/DL
MAGNESIUM SERPL-MCNC: 2.3 MG/DL (ref 1.7–2.3)
MCH RBC QN AUTO: 32.4 PG (ref 26.5–33)
MCHC RBC AUTO-ENTMCNC: 35.1 G/DL (ref 31.5–36.5)
MCV RBC AUTO: 92 FL (ref 78–100)
NONHDLC SERPL-MCNC: 231 MG/DL
PLATELET # BLD AUTO: 269 10E3/UL (ref 150–450)
POTASSIUM SERPL-SCNC: 4.8 MMOL/L (ref 3.4–5.3)
PROT SERPL-MCNC: 6.5 G/DL (ref 6.4–8.3)
PSA SERPL DL<=0.01 NG/ML-MCNC: 1.76 NG/ML (ref 0–6.5)
RBC # BLD AUTO: 5.09 10E6/UL (ref 4.4–5.9)
SODIUM SERPL-SCNC: 136 MMOL/L (ref 136–145)
TRIGL SERPL-MCNC: 149 MG/DL
WBC # BLD AUTO: 5.2 10E3/UL (ref 4–11)

## 2023-03-30 PROCEDURE — 83036 HEMOGLOBIN GLYCOSYLATED A1C: CPT | Mod: ZL | Performed by: INTERNAL MEDICINE

## 2023-03-30 PROCEDURE — 36415 COLL VENOUS BLD VENIPUNCTURE: CPT | Mod: ZL | Performed by: INTERNAL MEDICINE

## 2023-03-30 PROCEDURE — 99214 OFFICE O/P EST MOD 30 MIN: CPT | Performed by: INTERNAL MEDICINE

## 2023-03-30 PROCEDURE — G0463 HOSPITAL OUTPT CLINIC VISIT: HCPCS

## 2023-03-30 PROCEDURE — 83735 ASSAY OF MAGNESIUM: CPT | Mod: ZL | Performed by: INTERNAL MEDICINE

## 2023-03-30 PROCEDURE — 80053 COMPREHEN METABOLIC PANEL: CPT | Mod: ZL | Performed by: INTERNAL MEDICINE

## 2023-03-30 PROCEDURE — 80061 LIPID PANEL: CPT | Mod: ZL | Performed by: INTERNAL MEDICINE

## 2023-03-30 PROCEDURE — 85027 COMPLETE CBC AUTOMATED: CPT | Mod: ZL | Performed by: INTERNAL MEDICINE

## 2023-03-30 PROCEDURE — G0103 PSA SCREENING: HCPCS | Mod: ZL | Performed by: INTERNAL MEDICINE

## 2023-03-30 RX ORDER — SERTRALINE HYDROCHLORIDE 25 MG/1
TABLET, FILM COATED ORAL
Qty: 270 TABLET | Refills: 2 | Status: SHIPPED | OUTPATIENT
Start: 2023-03-30 | End: 2023-10-31

## 2023-03-30 RX ORDER — ALPRAZOLAM 0.25 MG
.125-.25 TABLET ORAL DAILY PRN
Qty: 40 TABLET | Refills: 2 | Status: SHIPPED | OUTPATIENT
Start: 2023-03-30 | End: 2023-10-31

## 2023-03-30 RX ORDER — ROSUVASTATIN CALCIUM 20 MG/1
20 TABLET, COATED ORAL DAILY
Qty: 90 TABLET | Refills: 2 | Status: SHIPPED | OUTPATIENT
Start: 2023-03-30 | End: 2023-10-31

## 2023-03-30 ASSESSMENT — ENCOUNTER SYMPTOMS
WOUND: 0
FREQUENCY: 1
WHEEZING: 0
SHORTNESS OF BREATH: 1
DIARRHEA: 0
PALPITATIONS: 0
NECK PAIN: 1
VOMITING: 0
HEMATURIA: 0
CONFUSION: 0
NAUSEA: 0
COUGH: 0
MYALGIAS: 1
ARTHRALGIAS: 1
DIZZINESS: 0
NUMBNESS: 1
FEVER: 0
AGITATION: 0
SLEEP DISTURBANCE: 0
PARESTHESIAS: 1
BRUISES/BLEEDS EASILY: 0
ABDOMINAL PAIN: 0
LIGHT-HEADEDNESS: 0
NERVOUS/ANXIOUS: 1
DYSURIA: 0
CHILLS: 0

## 2023-03-30 ASSESSMENT — PATIENT HEALTH QUESTIONNAIRE - PHQ9
SUM OF ALL RESPONSES TO PHQ QUESTIONS 1-9: 8
SUM OF ALL RESPONSES TO PHQ QUESTIONS 1-9: 8
10. IF YOU CHECKED OFF ANY PROBLEMS, HOW DIFFICULT HAVE THESE PROBLEMS MADE IT FOR YOU TO DO YOUR WORK, TAKE CARE OF THINGS AT HOME, OR GET ALONG WITH OTHER PEOPLE: NOT DIFFICULT AT ALL

## 2023-03-30 ASSESSMENT — PAIN SCALES - GENERAL: PAINLEVEL: NO PAIN (0)

## 2023-03-30 NOTE — PROGRESS NOTES
Assessment & Plan     ICD-10-CM    1. Benign essential hypertension  I10 CBC W PLT No Diff     Comprehensive Metabolic Panel     Magnesium     Magnesium     Comprehensive Metabolic Panel     CBC W PLT No Diff      2. Coronary artery disease due to lipid rich plaque  I25.10 empagliflozin (JARDIANCE) 10 MG TABS tablet    I25.83 rosuvastatin (CRESTOR) 20 MG tablet      3. History of ST elevation myocardial infarction (STEMI)  I25.2       4. Familial hyperlipidemia  E78.49 Lipid Panel     rosuvastatin (CRESTOR) 20 MG tablet     Lipid Panel      5. Chronic systolic heart failure (H)  I50.22       6. Paroxysmal supraventricular tachycardia (H)  I47.1       7. Morbid obesity (H)  E66.01       8. Vitamin B12 deficiency  E53.8       9. Heart failure with reduced ejection fraction (H)  I50.20 empagliflozin (JARDIANCE) 10 MG TABS tablet      10. Ischemic heart disease, chronic - EF 45-50% (2022)  I25.9 empagliflozin (JARDIANCE) 10 MG TABS tablet      11. Generalized anxiety disorder  F41.1 sertraline (ZOLOFT) 25 MG tablet     ALPRAZolam (XANAX) 0.25 MG tablet      12. Panic attacks  F41.0 sertraline (ZOLOFT) 25 MG tablet     ALPRAZolam (XANAX) 0.25 MG tablet      13. Chronic prescription benzodiazepine use  Z79.899 ALPRAZolam (XANAX) 0.25 MG tablet      14. Recurrent major depression in complete remission (H)  F33.42       15. Elevated random blood glucose level  R73.09 Hemoglobin A1c     Hemoglobin A1c      16. Encounter for screening for malignant neoplasm of prostate  Z12.5 PSA Screen GH     PSA Screen GH      Patient presents for follow-up, multiple issues.    Coronary artery disease with history of systolic heart failure.  States that he had echocardiogram done at [facility] this past fall, reports that his systolic function had improved significantly compared to his previous echocardiogram from a few years ago.  He plans to try getting these records sent to us otherwise we will need to consider updating  echocardiogram locally.  Doing well with Crestor, Constantino, has not needed the Lasix tablets recently, was getting some dizziness and lightheadedness.  Subsequently stopped taking Lasix about 4 days ago and has been feeling better.  Needs refills today.  Has history of myocardial infarction.  Denies exertional angina.    Hypertension. Ongoing. Blood pressure is currently well controlled.  Medication side effects: Yes - Was getting dizziness and lightheadedness with Lasix, subsequently stopped 4 days ago, doing better now.  Otherwise, no changes for now. Continue -Jardiance, losartan, also takes sotalol.   Medication list reviewed/updated. Refills completed as needed.      Familial hyperlipidemia.  Ongoing. LDL is at goal: No. Triglycerides are at goal: Yes.    Medication side effects reported: None. Continue current medications -supposedly taking Crestor 20 mg daily but is LDL cholesterol level is up significantly, compared to previous lab work.  Crestor refilled.  Recent Labs   Lab Test 03/30/23  1009 10/28/21  0816   CHOL 276* 164   HDL 45 48   * 89   TRIG 149 136       Paroxysmal supraventricular tachycardia, reports heart rate has been well controlled.  Continues with sotalol.    Obesity, Ongoing. Discussed need for reduced oral caloric intake, weight loss, regular exercise and reduce carbohydrate/sugar intake.    Vitamin B12 deficiency.  Reports taking oral supplement.  Continue current dosing.    Chronic generalized anxiety and panic attacks, still using Xanax.  Increased his sertraline up to 75 mg daily.  Needs updated prescriptions today.   website reviewed.  No opiate use.  Xanax refilled as noted.    History of depression, currently in remission.  Seem to be doing well with sertraline other than having anxiety.    Elevated random glucose, check hemoglobin A1c.    Prostate lab cancer screening today.  Check PSA.    Return in about 7 months (around 10/30/2023) for Annual Medicare Wellness  Visit.    Edenilson Adams MD  [facility]     Leonela Anaya is a 72 year old, presenting for the following health issues:  Follow Up (Heart issues/Lack of energy/Lack of stamina/Last night when getting up to the bathroom became very dizzy)    Additional Questions 3/30/2023   Roomed by Maxim Mitchell LPN   Accompanied by n/a     History of Present Illness       Reason for visit:  Follow up appointment set at last visit  Symptom onset:  More than a month  Symptoms include:  Lack of energy and stamina, heart issues dizziness  Symptom intensity:  Severe  Symptom progression:  Improving  Had these symptoms before:  Yes  Has tried/received treatment for these symptoms:  Yes  Previous treatment was successful:  Yes  What makes it worse:  Wifes illness,  PTSD issues  stopped working out, watching diet  What makes it better:  Watching diet, exercising    He eats 2-3 servings of fruits and vegetables daily.He consumes 0 sweetened beverage(s) daily.He exercises with enough effort to increase his heart rate 20 to 29 minutes per day.  He exercises with enough effort to increase his heart rate 6 days per week.   He is taking medications regularly.    Today's PHQ-9         PHQ-9 Total Score: 8    PHQ-9 Q9 Thoughts of better off dead/self-harm past 2 weeks :   Not at all    How difficult have these problems made it for you to do your work, take care of things at home, or get along with other people: Not difficult at all     Review of Systems   Constitutional: Negative for chills and fever.   HENT: Negative for congestion and hearing loss.    Eyes: Negative for visual disturbance.   Respiratory: Positive for shortness of breath (Improving). Negative for cough and wheezing.    Cardiovascular: Positive for peripheral edema (Improving). Negative for chest pain and palpitations.   Gastrointestinal: Negative for abdominal pain, diarrhea (+ Improved with less Sertraline dose), nausea and vomiting.   Endocrine: Negative  "for cold intolerance and heat intolerance.   Genitourinary: Positive for frequency, impotence and urgency. Negative for dysuria, hematuria and penile discharge.   Musculoskeletal: Positive for arthralgias, myalgias and neck pain.        + restless legs   Skin: Negative for rash and wound.   Allergic/Immunologic: Negative for immunocompromised state.   Neurological: Positive for numbness and paresthesias. Negative for dizziness and light-headedness.   Hematological: Does not bruise/bleed easily.   Psychiatric/Behavioral: Negative for agitation, confusion and sleep disturbance (Improving). The patient is nervous/anxious.           Objective    /70 (BP Location: Right arm, Patient Position: Right side, Cuff Size: Adult Regular)   Pulse 62   Temp 97.6  F (36.4  C) (Temporal)   Resp 20   Ht 1.772 m (5' 9.75\")   SpO2 97%   BMI 36.22 kg/m    Body mass index is 36.22 kg/m .     Physical Exam  Constitutional:       General: He is not in acute distress.     Appearance: He is well-developed. He is not diaphoretic.   HENT:      Head: Normocephalic and atraumatic.   Eyes:      General: No scleral icterus.     Conjunctiva/sclera: Conjunctivae normal.   Neck:      Vascular: No carotid bruit.   Cardiovascular:      Rate and Rhythm: Normal rate and regular rhythm.      Pulses: Normal pulses.   Pulmonary:      Effort: Pulmonary effort is normal.      Breath sounds: No wheezing.   Abdominal:      General: Bowel sounds are normal.      Palpations: Abdomen is soft.      Tenderness: There is no abdominal tenderness.   Musculoskeletal:         General: No tenderness or deformity. Normal range of motion.      Cervical back: Neck supple.      Right lower leg: Pitting Edema (trace) present.      Left lower leg: Pitting Edema (trace) present.   Lymphadenopathy:      Cervical: No cervical adenopathy.   Skin:     General: Skin is warm and dry.      Findings: No rash.   Neurological:      General: No focal deficit present.      " Mental Status: He is alert. Mental status is at baseline.   Psychiatric:         Mood and Affect: Mood normal.         Behavior: Behavior normal.          Recent Labs   Lab Test 03/30/23  1009 10/28/21  0816 07/31/20  0913   A1C 5.5 6.0 5.5   * 89 186*   HDL 45 48 51   TRIG 149 136 134   ALT 11 10 9   CR 1.05 0.97 1.07   GFRESTIMATED 76 79 69   POTASSIUM 4.8 4.7 4.5   TSH  --  3.11  --    WBC 5.2 6.5 6.6   HGB 16.5 15.6 16.3    251 284   ALBUMIN 3.8 3.9 4.3     Hemoglobin A1c is well controlled.  LDL is high and not at goal.  HDL and triglycerides are at goal.  ALT normal.  Creatinine has worsened slightly.  Potassium normal.  CBC normal.  Albumin normal.

## 2023-03-30 NOTE — NURSING NOTE
"Chief Complaint   Patient presents with     Follow Up     Heart issues  Lack of energy  Lack of stamina  Last night when getting up to the bathroom became very dizzy         Initial /70 (BP Location: Right arm, Patient Position: Right side, Cuff Size: Adult Regular)   Pulse 62   Temp 97.6  F (36.4  C) (Temporal)   Resp 20   Ht 1.772 m (5' 9.75\")   SpO2 97%   BMI 36.22 kg/m   Estimated body mass index is 36.22 kg/m  as calculated from the following:    Height as of this encounter: 1.772 m (5' 9.75\").    Weight as of 10/28/22: 113.7 kg (250 lb 9.6 oz).       FOOD SECURITY SCREENING QUESTIONS:    The next two questions are to help us understand your food security.  If you are feeling you need any assistance in this area, we have resources available to support you today.    Hunger Vital Signs:  Within the past 12 months we worried whether our food would run out before we got money to buy more. Never  Within the past 12 months the food we bought just didn't last and we didn't have money to get more. Never    Advance Care Directive on file? no      Medication reconciliation complete.      Maxim Briones,on 3/30/2023 at 9:28 AM        "

## 2023-03-30 NOTE — LETTER
March 30, 2023      Héctor Thornton  [address] [place] [id number]        Dear ,    We are writing to inform you of your test results.    PSA is normal.  Hemoglobin A1c is normal.  Magnesium is normal.    Your cholesterol levels are very high.  Make sure you are taking Crestor.    -- If you are not taking Crestor, restart.  -- If you have been taking Crestor 20 mg daily, we should increase this up to 40 mg daily.    Labs are otherwise stable.    Electronically signed by:  Edenilson Adams MD  on March 30, 2023     Resulted Orders   Magnesium   Result Value Ref Range    Magnesium 2.3 1.7 - 2.3 mg/dL   Hemoglobin A1c   Result Value Ref Range    Hemoglobin A1C 5.5 4.0 - 6.2 %   PSA Screen GH   Result Value Ref Range    Prostate Specific Antigen Screen 1.76 0.00 - 6.50 ng/mL    Narrative    This result is obtained using the Roche Elecsys total PSA method on the aline e411 immunoassay analyzer. Results obtained with different assay methods or kits cannot be used interchangeably.   Lipid Panel   Result Value Ref Range    Cholesterol 276 (H) <200 mg/dL    Triglycerides 149 <150 mg/dL    Direct Measure HDL 45 >=40 mg/dL    LDL Cholesterol Calculated 201 (H) <=100 mg/dL    Non HDL Cholesterol 231 (H) <130 mg/dL    Narrative    Cholesterol  Desirable:  <200 mg/dL    Triglycerides  Normal:  Less than 150 mg/dL  Borderline High:  150-199 mg/dL  High:  200-499 mg/dL  Very High:  Greater than or equal to 500 mg/dL    Direct Measure HDL  Female:  Greater than or equal to 50 mg/dL   Male:  Greater than or equal to 40 mg/dL    LDL Cholesterol  Desirable:  <100mg/dL  Above Desirable:  100-129 mg/dL   Borderline High:  130-159 mg/dL   High:  160-189 mg/dL   Very High:  >= 190 mg/dL    Non HDL Cholesterol  Desirable:  130 mg/dL  Above Desirable:  130-159 mg/dL  Borderline High:  160-189 mg/dL  High:  190-219 mg/dL  Very High:  Greater than or equal to 220 mg/dL   Comprehensive Metabolic Panel   Result Value Ref  Range    Sodium 136 136 - 145 mmol/L    Potassium 4.8 3.4 - 5.3 mmol/L    Chloride 99 98 - 107 mmol/L    Carbon Dioxide (CO2) 30 (H) 22 - 29 mmol/L    Anion Gap 7 7 - 15 mmol/L    Urea Nitrogen 16.5 8.0 - 23.0 mg/dL    Creatinine 1.05 0.67 - 1.17 mg/dL    Calcium 9.4 8.8 - 10.2 mg/dL    Glucose 93 70 - 99 mg/dL    Alkaline Phosphatase 60 40 - 129 U/L    AST 18 10 - 50 U/L    ALT 11 10 - 50 U/L    Protein Total 6.5 6.4 - 8.3 g/dL    Albumin 3.8 3.5 - 5.2 g/dL    Bilirubin Total 0.4 <=1.2 mg/dL    GFR Estimate 76 >60 mL/min/1.73m2      Comment:      eGFR calculated using 2021 CKD-EPI equation.   CBC W PLT No Diff   Result Value Ref Range    WBC Count 5.2 4.0 - 11.0 10e3/uL    RBC Count 5.09 4.40 - 5.90 10e6/uL    Hemoglobin 16.5 13.3 - 17.7 g/dL    Hematocrit 47.0 40.0 - 53.0 %    MCV 92 78 - 100 fL    MCH 32.4 26.5 - 33.0 pg    MCHC 35.1 31.5 - 36.5 g/dL    RDW 13.1 10.0 - 15.0 %    Platelet Count 269 150 - 450 10e3/uL       If you have any questions or concerns, please call the clinic at the number listed above.       Sincerely,      Edenilson Adams MD

## 2023-04-03 ENCOUNTER — NURSE TRIAGE (OUTPATIENT)
Dept: INTERNAL MEDICINE | Facility: OTHER | Age: 73
End: 2023-04-03
Payer: COMMERCIAL

## 2023-04-03 NOTE — TELEPHONE ENCOUNTER
"Routing to DOD in PCP absence.     Patient accidentally took double doses of the below medications this morning. He has not done his workout yet today and wondering if he should hold off. Normally he walks 3/4 mile at 2 mph, 15 minutes of light free weights. He's feeling \"crappy\" but he only had 3 hours of broken sleep last night and thinks it could be from that. Denies other symptoms, \"maybe a little light headed.\" Is it okay for him to proceed with his day as per usual and monitor/call back if any symptoms develop?     Aspirin 81 mg  Sotolol 80 mg  sertraline 50 mg  lostartan 25 mg  jardiance 10    BP earlier today was 112/68 but pulse is at 54 compaired to normal resting pulse is 60-62.  116/69 and 57 HR upon retake on the phone.    Patient advised to call back if symptoms suddenly worsen or new symptoms develop.   Indira Leblanc RN on 4/3/2023 at 1:07 PM       Reason for Disposition    DOUBLE DOSE (an extra dose or lesser amount) of prescription drug and NO symptoms  (Exception: A double dose of antibiotics.)    Protocols used: MEDICATION QUESTION CALL-A-OH      "

## 2023-05-09 ENCOUNTER — TRANSFERRED RECORDS (OUTPATIENT)
Dept: HEALTH INFORMATION MANAGEMENT | Facility: OTHER | Age: 73
End: 2023-05-09
Payer: COMMERCIAL

## 2023-07-06 DIAGNOSIS — I47.10 PAROXYSMAL SUPRAVENTRICULAR TACHYCARDIA (H): ICD-10-CM

## 2023-07-06 DIAGNOSIS — I25.10 CORONARY ARTERY DISEASE DUE TO LIPID RICH PLAQUE: ICD-10-CM

## 2023-07-06 DIAGNOSIS — I25.83 CORONARY ARTERY DISEASE DUE TO LIPID RICH PLAQUE: ICD-10-CM

## 2023-07-06 DIAGNOSIS — I49.3 PVC'S (PREMATURE VENTRICULAR CONTRACTIONS): ICD-10-CM

## 2023-07-07 RX ORDER — SOTALOL HYDROCHLORIDE 80 MG/1
TABLET ORAL
Qty: 180 TABLET | Refills: 0 | Status: SHIPPED | OUTPATIENT
Start: 2023-07-07 | End: 2023-10-31

## 2023-08-08 DIAGNOSIS — Z87.891 HISTORY OF TOBACCO ABUSE: ICD-10-CM

## 2023-08-10 RX ORDER — ALBUTEROL SULFATE 90 UG/1
AEROSOL, METERED RESPIRATORY (INHALATION)
Qty: 54 G | Refills: 0 | Status: SHIPPED | OUTPATIENT
Start: 2023-08-10 | End: 2024-08-26

## 2023-10-27 ASSESSMENT — ENCOUNTER SYMPTOMS
NERVOUS/ANXIOUS: 1
HEMATURIA: 0
FREQUENCY: 1
CONSTIPATION: 0
EYE PAIN: 0
HEARTBURN: 0
DYSURIA: 0
JOINT SWELLING: 0
ABDOMINAL PAIN: 0
SORE THROAT: 0
WEAKNESS: 0
ARTHRALGIAS: 1
COUGH: 0
PARESTHESIAS: 0
SHORTNESS OF BREATH: 0
FEVER: 0
MYALGIAS: 1
CHILLS: 1
NAUSEA: 0
HEMATOCHEZIA: 0
DIZZINESS: 0
DIARRHEA: 1
HEADACHES: 0
PALPITATIONS: 0

## 2023-10-27 ASSESSMENT — ACTIVITIES OF DAILY LIVING (ADL): CURRENT_FUNCTION: NO ASSISTANCE NEEDED

## 2023-10-31 ENCOUNTER — OFFICE VISIT (OUTPATIENT)
Dept: INTERNAL MEDICINE | Facility: OTHER | Age: 73
End: 2023-10-31
Attending: INTERNAL MEDICINE
Payer: COMMERCIAL

## 2023-10-31 VITALS
WEIGHT: 223 LBS | TEMPERATURE: 97 F | HEART RATE: 57 BPM | BODY MASS INDEX: 31.92 KG/M2 | SYSTOLIC BLOOD PRESSURE: 135 MMHG | HEIGHT: 70 IN | RESPIRATION RATE: 20 BRPM | OXYGEN SATURATION: 97 % | DIASTOLIC BLOOD PRESSURE: 72 MMHG

## 2023-10-31 DIAGNOSIS — I50.20 HEART FAILURE WITH REDUCED EJECTION FRACTION (H): ICD-10-CM

## 2023-10-31 DIAGNOSIS — G89.29 CHRONIC NECK PAIN: ICD-10-CM

## 2023-10-31 DIAGNOSIS — E66.09 CLASS 1 OBESITY DUE TO EXCESS CALORIES WITH SERIOUS COMORBIDITY AND BODY MASS INDEX (BMI) OF 32.0 TO 32.9 IN ADULT: ICD-10-CM

## 2023-10-31 DIAGNOSIS — Z79.899 CHRONIC PRESCRIPTION BENZODIAZEPINE USE: ICD-10-CM

## 2023-10-31 DIAGNOSIS — F41.0 PANIC ATTACKS: ICD-10-CM

## 2023-10-31 DIAGNOSIS — Z71.85 VACCINE COUNSELING: ICD-10-CM

## 2023-10-31 DIAGNOSIS — F51.01 PRIMARY INSOMNIA: ICD-10-CM

## 2023-10-31 DIAGNOSIS — I25.9 ISCHEMIC HEART DISEASE, CHRONIC: ICD-10-CM

## 2023-10-31 DIAGNOSIS — F41.1 GENERALIZED ANXIETY DISORDER: ICD-10-CM

## 2023-10-31 DIAGNOSIS — I47.10 PAROXYSMAL SUPRAVENTRICULAR TACHYCARDIA (H): ICD-10-CM

## 2023-10-31 DIAGNOSIS — M54.2 CHRONIC NECK PAIN: ICD-10-CM

## 2023-10-31 DIAGNOSIS — E78.49 FAMILIAL HYPERLIPIDEMIA: ICD-10-CM

## 2023-10-31 DIAGNOSIS — Z00.00 ENCOUNTER FOR MEDICARE ANNUAL WELLNESS EXAM: ICD-10-CM

## 2023-10-31 DIAGNOSIS — I10 BENIGN ESSENTIAL HYPERTENSION: Primary | ICD-10-CM

## 2023-10-31 DIAGNOSIS — F33.42 RECURRENT MAJOR DEPRESSION IN COMPLETE REMISSION (H): ICD-10-CM

## 2023-10-31 DIAGNOSIS — E53.8 VITAMIN B12 DEFICIENCY: ICD-10-CM

## 2023-10-31 DIAGNOSIS — N40.1 BENIGN PROSTATIC HYPERPLASIA WITH NOCTURIA: ICD-10-CM

## 2023-10-31 DIAGNOSIS — I25.10 CORONARY ARTERY DISEASE DUE TO LIPID RICH PLAQUE: ICD-10-CM

## 2023-10-31 DIAGNOSIS — I25.2 HISTORY OF ST ELEVATION MYOCARDIAL INFARCTION (STEMI): ICD-10-CM

## 2023-10-31 DIAGNOSIS — I49.3 PVC'S (PREMATURE VENTRICULAR CONTRACTIONS): ICD-10-CM

## 2023-10-31 DIAGNOSIS — Z12.5 ENCOUNTER FOR SCREENING FOR MALIGNANT NEOPLASM OF PROSTATE: ICD-10-CM

## 2023-10-31 DIAGNOSIS — I25.83 CORONARY ARTERY DISEASE DUE TO LIPID RICH PLAQUE: ICD-10-CM

## 2023-10-31 DIAGNOSIS — E66.811 CLASS 1 OBESITY DUE TO EXCESS CALORIES WITH SERIOUS COMORBIDITY AND BODY MASS INDEX (BMI) OF 32.0 TO 32.9 IN ADULT: ICD-10-CM

## 2023-10-31 DIAGNOSIS — R35.1 BENIGN PROSTATIC HYPERPLASIA WITH NOCTURIA: ICD-10-CM

## 2023-10-31 DIAGNOSIS — Z23 NEEDS FLU SHOT: ICD-10-CM

## 2023-10-31 LAB
ALBUMIN SERPL BCG-MCNC: 4.3 G/DL (ref 3.5–5.2)
ALP SERPL-CCNC: 66 U/L (ref 40–129)
ALT SERPL W P-5'-P-CCNC: 10 U/L (ref 0–70)
ANION GAP SERPL CALCULATED.3IONS-SCNC: 9 MMOL/L (ref 7–15)
AST SERPL W P-5'-P-CCNC: 18 U/L (ref 0–45)
BASOPHILS # BLD AUTO: 0.1 10E3/UL (ref 0–0.2)
BASOPHILS NFR BLD AUTO: 2 %
BILIRUB SERPL-MCNC: 0.5 MG/DL
BUN SERPL-MCNC: 18.8 MG/DL (ref 8–23)
CALCIUM SERPL-MCNC: 9.5 MG/DL (ref 8.8–10.2)
CHLORIDE SERPL-SCNC: 99 MMOL/L (ref 98–107)
CHOLEST SERPL-MCNC: 178 MG/DL
CREAT SERPL-MCNC: 1.03 MG/DL (ref 0.67–1.17)
DEPRECATED HCO3 PLAS-SCNC: 29 MMOL/L (ref 22–29)
EGFRCR SERPLBLD CKD-EPI 2021: 78 ML/MIN/1.73M2
EOSINOPHIL # BLD AUTO: 0.3 10E3/UL (ref 0–0.7)
EOSINOPHIL NFR BLD AUTO: 4 %
ERYTHROCYTE [DISTWIDTH] IN BLOOD BY AUTOMATED COUNT: 12.2 % (ref 10–15)
GLUCOSE SERPL-MCNC: 92 MG/DL (ref 70–99)
HCT VFR BLD AUTO: 48.9 % (ref 40–53)
HDLC SERPL-MCNC: 56 MG/DL
HGB BLD-MCNC: 16.4 G/DL (ref 13.3–17.7)
IMM GRANULOCYTES # BLD: 0 10E3/UL
IMM GRANULOCYTES NFR BLD: 0 %
LDLC SERPL CALC-MCNC: 100 MG/DL
LYMPHOCYTES # BLD AUTO: 1.9 10E3/UL (ref 0.8–5.3)
LYMPHOCYTES NFR BLD AUTO: 31 %
MCH RBC QN AUTO: 31.4 PG (ref 26.5–33)
MCHC RBC AUTO-ENTMCNC: 33.5 G/DL (ref 31.5–36.5)
MCV RBC AUTO: 94 FL (ref 78–100)
MONOCYTES # BLD AUTO: 0.6 10E3/UL (ref 0–1.3)
MONOCYTES NFR BLD AUTO: 10 %
NEUTROPHILS # BLD AUTO: 3.2 10E3/UL (ref 1.6–8.3)
NEUTROPHILS NFR BLD AUTO: 53 %
NONHDLC SERPL-MCNC: 122 MG/DL
NRBC # BLD AUTO: 0 10E3/UL
NRBC BLD AUTO-RTO: 0 /100
PLATELET # BLD AUTO: 248 10E3/UL (ref 150–450)
POTASSIUM SERPL-SCNC: 4.4 MMOL/L (ref 3.4–5.3)
PROT SERPL-MCNC: 6.7 G/DL (ref 6.4–8.3)
PSA SERPL DL<=0.01 NG/ML-MCNC: 1.26 NG/ML (ref 0–6.5)
RBC # BLD AUTO: 5.23 10E6/UL (ref 4.4–5.9)
SODIUM SERPL-SCNC: 137 MMOL/L (ref 135–145)
TRIGL SERPL-MCNC: 109 MG/DL
WBC # BLD AUTO: 6 10E3/UL (ref 4–11)

## 2023-10-31 PROCEDURE — G0463 HOSPITAL OUTPT CLINIC VISIT: HCPCS | Mod: 25

## 2023-10-31 PROCEDURE — 99214 OFFICE O/P EST MOD 30 MIN: CPT | Mod: 25 | Performed by: INTERNAL MEDICINE

## 2023-10-31 PROCEDURE — 36415 COLL VENOUS BLD VENIPUNCTURE: CPT | Mod: ZL | Performed by: INTERNAL MEDICINE

## 2023-10-31 PROCEDURE — G0439 PPPS, SUBSEQ VISIT: HCPCS | Performed by: INTERNAL MEDICINE

## 2023-10-31 PROCEDURE — 80061 LIPID PANEL: CPT | Mod: ZL | Performed by: INTERNAL MEDICINE

## 2023-10-31 PROCEDURE — G0103 PSA SCREENING: HCPCS | Mod: ZL | Performed by: INTERNAL MEDICINE

## 2023-10-31 PROCEDURE — 80053 COMPREHEN METABOLIC PANEL: CPT | Mod: ZL | Performed by: INTERNAL MEDICINE

## 2023-10-31 PROCEDURE — 85025 COMPLETE CBC W/AUTO DIFF WBC: CPT | Mod: ZL | Performed by: INTERNAL MEDICINE

## 2023-10-31 PROCEDURE — 90662 IIV NO PRSV INCREASED AG IM: CPT

## 2023-10-31 RX ORDER — ESTAZOLAM 1 MG
.5-1 TABLET ORAL AT BEDTIME
Qty: 90 TABLET | Refills: 1 | Status: SHIPPED | OUTPATIENT
Start: 2023-10-31

## 2023-10-31 RX ORDER — SOTALOL HYDROCHLORIDE 80 MG/1
80 TABLET ORAL 2 TIMES DAILY
Qty: 180 TABLET | Refills: 4 | Status: SHIPPED | OUTPATIENT
Start: 2023-10-31

## 2023-10-31 RX ORDER — TAMSULOSIN HYDROCHLORIDE 0.4 MG/1
0.4 CAPSULE ORAL 2 TIMES DAILY
Qty: 180 CAPSULE | Refills: 4 | Status: SHIPPED | OUTPATIENT
Start: 2023-10-31

## 2023-10-31 RX ORDER — NITROGLYCERIN 0.4 MG/1
TABLET SUBLINGUAL
Qty: 25 TABLET | Refills: 3 | Status: SHIPPED | OUTPATIENT
Start: 2023-10-31

## 2023-10-31 RX ORDER — CLOPIDOGREL BISULFATE 75 MG/1
TABLET ORAL
Qty: 90 TABLET | Refills: 4 | Status: SHIPPED | OUTPATIENT
Start: 2023-10-31 | End: 2024-05-13

## 2023-10-31 RX ORDER — ROSUVASTATIN CALCIUM 20 MG/1
20 TABLET, COATED ORAL DAILY
Qty: 90 TABLET | Refills: 4 | Status: SHIPPED | OUTPATIENT
Start: 2023-10-31

## 2023-10-31 RX ORDER — LOSARTAN POTASSIUM 25 MG/1
25 TABLET ORAL DAILY
Qty: 90 TABLET | Refills: 4 | Status: SHIPPED | OUTPATIENT
Start: 2023-10-31

## 2023-10-31 RX ORDER — ALPRAZOLAM 0.25 MG
.125-.25 TABLET ORAL DAILY PRN
Qty: 40 TABLET | Refills: 2 | Status: SHIPPED | OUTPATIENT
Start: 2023-10-31

## 2023-10-31 RX ORDER — SOTALOL HYDROCHLORIDE 80 MG/1
80 TABLET ORAL 2 TIMES DAILY
Qty: 180 TABLET | Refills: 4 | Status: SHIPPED | OUTPATIENT
Start: 2023-10-31 | End: 2023-10-31

## 2023-10-31 RX ORDER — SENNOSIDES 8.6 MG
650 CAPSULE ORAL EVERY 8 HOURS PRN
Qty: 300 TABLET | Refills: 4 | Status: SHIPPED | OUTPATIENT
Start: 2023-10-31

## 2023-10-31 RX ORDER — FINASTERIDE 5 MG/1
5 TABLET, FILM COATED ORAL DAILY
Qty: 90 TABLET | Refills: 4 | Status: SHIPPED | OUTPATIENT
Start: 2023-10-31

## 2023-10-31 ASSESSMENT — ENCOUNTER SYMPTOMS
DIARRHEA: 1
FREQUENCY: 1
MYALGIAS: 1
CONSTIPATION: 0
EYE PAIN: 0
DYSURIA: 0
ABDOMINAL PAIN: 0
COUGH: 0
ARTHRALGIAS: 1
FEVER: 0
HEMATURIA: 0
JOINT SWELLING: 0
NAUSEA: 0
WEAKNESS: 0
HEARTBURN: 0
SHORTNESS OF BREATH: 0
SORE THROAT: 0
CHILLS: 1
PALPITATIONS: 0
DIZZINESS: 0
NERVOUS/ANXIOUS: 1
PARESTHESIAS: 0
HEADACHES: 0
HEMATOCHEZIA: 0
BRUISES/BLEEDS EASILY: 0

## 2023-10-31 ASSESSMENT — PAIN SCALES - GENERAL: PAINLEVEL: MILD PAIN (3)

## 2023-10-31 ASSESSMENT — PATIENT HEALTH QUESTIONNAIRE - PHQ9
SUM OF ALL RESPONSES TO PHQ QUESTIONS 1-9: 13
SUM OF ALL RESPONSES TO PHQ QUESTIONS 1-9: 13
10. IF YOU CHECKED OFF ANY PROBLEMS, HOW DIFFICULT HAVE THESE PROBLEMS MADE IT FOR YOU TO DO YOUR WORK, TAKE CARE OF THINGS AT HOME, OR GET ALONG WITH OTHER PEOPLE: SOMEWHAT DIFFICULT

## 2023-10-31 ASSESSMENT — ACTIVITIES OF DAILY LIVING (ADL): CURRENT_FUNCTION: NO ASSISTANCE NEEDED

## 2023-10-31 NOTE — PATIENT INSTRUCTIONS
Blood pressure is well controlled.     Medications refilled.     Labs are pending.       Start Estazolam at bedtime - for sleep.     Start B12 with b-complex - for balance, mood, energy.     Urology clinic referral was sent -- they will call with date/time of appointment.       Switch to Twice daily Flomax / Tamsulosin.       Return in approximately 1 year, or sooner as needed for follow-up with Dr. Adams.  - Annual Follow-up / Physical - Medicare Annual Wellness Visit     Clinic : [phone]  Appointment line: [phone]       Patient Education   Personalized Prevention Plan  You are due for the preventive services outlined below.  Your care team is available to assist you in scheduling these services.  If you have already completed any of these items, please share that information with your care team to update in your medical record.  Health Maintenance Due   Topic Date Due    Zoster (Shingles) Vaccine (1 of 2) Never done    RSV VACCINE 60+ (1 - 1-dose 60+ series) Never done    Heart Failure Action Plan  06/05/2021    COVID-19 Vaccine (2 - 2023-24 season) 09/01/2023    Basic Metabolic Panel  09/30/2023    Annual Wellness Visit  10/28/2023     Preventive Health Recommendations  See your health care provider every year to  Review health changes.   Discuss preventive care.    Review your medicines if your doctor has prescribed any.  Talk with your health care provider about whether you should have a test to screen for prostate cancer (PSA).  Every 3 years, have a diabetes test (fasting glucose). If you are at risk for diabetes, you should have this test more often.  Every 5 years, have a cholesterol test. Have this test more often if you are at risk for high cholesterol or heart disease.   Every 10 years, have a colonoscopy. Or, have a yearly FIT test (stool test). These exams will check for colon cancer.  Talk to with your health care provider about screening for Abdominal Aortic Aneurysm if you have a  family history of AAA or have a history of smoking.    Shots:   Get a flu shot each year.   Get a tetanus shot every 10 years.   Talk to your doctor about your pneumonia vaccines. There are now two you should receive - Pneumovax (PPSV 23) and Prevnar (PCV 13).  Talk to your pharmacist about a shingles vaccine.   Talk to your doctor about the hepatitis B vaccine.    Nutrition:   Eat at least 5 servings of fruits and vegetables each day.   Eat whole-grain bread, whole-wheat pasta and brown rice instead of white grains and rice.   Get adequate Calcium and Vitamin D.     Lifestyle  Exercise for at least 150 minutes a week (30 minutes a day, 5 days a week). This will help you control your weight and prevent disease.   Limit alcohol to one drink per day.   No smoking.   Wear sunscreen to prevent skin cancer.   See your dentist every six months for an exam and cleaning.   See your eye doctor every 1 to 2 years to screen for conditions such as glaucoma, macular degeneration and cataracts.    Personalized Prevention Plan  You are due for the preventive services outlined below.  Your care team is available to assist you in scheduling these services.  If you have already completed any of these items, please share that information with your care team to update in your medical record.  Health Maintenance   Topic Date Due    ZOSTER IMMUNIZATION (1 of 2) Never done    RSV VACCINE 60+ (1 - 1-dose 60+ series) Never done    HF ACTION PLAN  06/05/2021    COVID-19 Vaccine (2 - 2023-24 season) 09/01/2023    BMP  09/30/2023    MEDICARE ANNUAL WELLNESS VISIT  10/28/2023    DTAP/TDAP/TD IMMUNIZATION (2 - Td or Tdap) 12/11/2023    ALT  03/30/2024    LIPID  03/30/2024    CBC  03/30/2024    PHQ-9  04/30/2024    NICOTINE/TOBACCO CESSATION COUNSELING Q 1 YR  10/31/2024    FALL RISK ASSESSMENT  10/31/2024    COLORECTAL CANCER SCREENING  11/11/2024    ADVANCE CARE PLANNING  10/31/2028    TSH W/FREE T4 REFLEX  Completed    INFLUENZA VACCINE   Completed    Pneumococcal Vaccine: 65+ Years  Completed    AORTIC ANEURYSM SCREENING (SYSTEM ASSIGNED)  Completed    HEPATITIS C SCREENING  Addressed    DEPRESSION ACTION PLAN  Addressed    IPV IMMUNIZATION  Aged Out    HPV IMMUNIZATION  Aged Out    MENINGITIS IMMUNIZATION  Aged Out    LUNG CANCER SCREENING  Discontinued       Learning About Dietary Guidelines  What are the Dietary Guidelines for Americans?     Dietary Guidelines for Americans provide tips for eating well and staying healthy. This helps reduce the risk for long-term (chronic) diseases.  These guidelines recommend that you:  Eat and drink the right amount for you. The [place] government's food guide is called MyPlate. It can help you make your own well-balanced eating plan.  Try to balance your eating with your activity. This helps you stay at a healthy weight.  Drink alcohol in moderation, if at all.  Limit foods high in salt, saturated fat, trans fat, and added sugar.  These guidelines are from the U.[place] Department of Agriculture and the U[place] Department of Health and Human Services. They are updated every 5 years.  What is MyPlate?  MyPlate is the [place] government's food guide. It can help you make your own well-balanced eating plan. A balanced eating plan means that you eat enough, but not too much, and that your food gives you the nutrients you need to stay healthy.  MyPlate focuses on eating plenty of whole grains, fruits, and vegetables, and on limiting fat and sugar. It is available online at www.ChooseMyPlate.gov.  How can you get started?  If you're trying to eat healthier, you can slowly change your eating habits over time. You don't have to make big changes all at once. Start by adding one or two healthy foods to your meals each day.  Grains  Choose whole-grain breads and cereals and whole-wheat pasta and whole-grain crackers.  Vegetables  Eat a variety of vegetables every day. They have lots of nutrients and are part of a heart-healthy  "diet.  Fruits  Eat a variety of fruits every day. Fruits contain lots of nutrients. Choose fresh fruit instead of fruit juice.  Protein foods  Choose fish and lean poultry more often. Eat red meat and fried meats less often. Dried beans, tofu, and nuts are also good sources of protein.  Dairy  Choose low-fat or fat-free products from this food group. If you have problems digesting milk, try eating cheese or yogurt instead.  Fats and oils  Limit fats and oils if you're trying to cut calories. Choose healthy fats when you cook. These include canola oil and olive oil.  Where can you learn more?  Go to https://www.[employer].org.net/patiented  Enter D676 in the search box to learn more about \"Learning About Dietary Guidelines.\"  Current as of: March 1, 2023               Content Version: 13.7    2613-2426 [employer]   Care instructions adapted under license by your healthcare professional. If you have questions about a medical condition or this instruction, always ask your healthcare professional. [employer] disclaims any warranty or liability for your use of this information.      Hearing Loss: Care Instructions  Overview     Hearing loss is a sudden or slow decrease in how well you hear. It can range from slight to profound. Permanent hearing loss can occur with aging. It also can happen when you are exposed long-term to loud noise. Examples include listening to loud music, riding motorcycles, or being around other loud machines.  Hearing loss can affect your work and home life. It can make you feel lonely or depressed. You may feel that you have lost your independence. But hearing aids and other devices can help you hear better and feel connected to others.  Follow-up care is a key part of your treatment and safety. Be sure to make and go to all appointments, and call your doctor if you are having problems. It's also a good idea to know your test results and keep a list of the medicines you " take.  How can you care for yourself at home?  Avoid loud noises whenever possible. This helps keep your hearing from getting worse.  Always wear hearing protection around loud noises.  Wear a hearing aid as directed.  A professional can help you pick a hearing aid that will work best for you.  You can also get hearing aids over the counter for mild to moderate hearing loss.  Have hearing tests as your doctor suggests. They can show whether your hearing has changed. Your hearing aid may need to be adjusted.  Use other devices as needed. These may include:  Telephone amplifiers and hearing aids that can connect to a television, stereo, radio, or microphone.  Devices that use lights or vibrations. These alert you to the doorbell, a ringing telephone, or a baby monitor.  Television closed-captioning. This shows the words at the bottom of the screen. Most new TVs can do this.  TTY (text telephone). This lets you type messages back and forth on the telephone instead of talking or listening. These devices are also called TDD. When messages are typed on the keyboard, they are sent over the phone line to a receiving TTY. The message is shown on a monitor.  Use text messaging, social media, and email if it is hard for you to communicate by telephone.  Try to learn a listening technique called speechreading. It is not lipreading. You pay attention to people's gestures, expressions, posture, and tone of voice. These clues can help you understand what a person is saying. Face the person you are talking to, and have them face you. Make sure the lighting is good. You need to see the other person's face clearly.  Think about counseling if you need help to adjust to your hearing loss.  When should you call for help?  Watch closely for changes in your health, and be sure to contact your doctor if:    You think your hearing is getting worse.     You have new symptoms, such as dizziness or nausea.   Where can you learn more?  Go to  "https://www.[employer].net/patiented  Enter R798 in the search box to learn more about \"Hearing Loss: Care Instructions.\"  Current as of: March 1, 2023               Content Version: 13.7 2006-2023 [employer].   Care instructions adapted under license by your healthcare professional. If you have questions about a medical condition or this instruction, always ask your healthcare professional. [employer] disclaims any warranty or liability for your use of this information.      Bladder Training: Care Instructions  Your Care Instructions     Bladder training is used to treat urge incontinence and stress incontinence. Urge incontinence means that the need to urinate comes on so fast that you can't get to a toilet in time. Stress incontinence means that you leak urine because of pressure on your bladder. For example, it may happen when you laugh, cough, or lift something heavy.  Bladder training can increase how long you can wait before you have to urinate. It can also help your bladder hold more urine. And it can give you better control over the urge to urinate.  It is important to remember that bladder training takes a few weeks to a few months to make a difference. You may not see results right away, but don't give up.  Follow-up care is a key part of your treatment and safety. Be sure to make and go to all appointments, and call your doctor if you are having problems. It's also a good idea to know your test results and keep a list of the medicines you take.  How can you care for yourself at home?  Work with your doctor to come up with a bladder training program that is right for you. You may use one or more of the following methods.  Delayed urination  In the beginning, try to keep from urinating for 5 minutes after you first feel the need to go.  While you wait, take deep, slow breaths to relax. Kegel exercises can also help you delay the need to go to the bathroom.  After some " "practice, when you can easily wait 5 minutes to urinate, try to wait 10 minutes before you urinate.  Slowly increase the waiting period until you are able to control when you have to urinate.  Scheduled urination  Empty your bladder when you first wake up in the morning.  Schedule times throughout the day when you will urinate.  Start by going to the bathroom every hour, even if you don't need to go.  Slowly increase the time between trips to the bathroom.  When you have found a schedule that works well for you, keep doing it.  If you wake up during the night and have to urinate, do it. Apply your schedule to waking hours only.  Kegel exercises  These tighten and strengthen pelvic muscles, which can help you control the flow of urine. (If doing these exercises causes pain, stop doing them and talk with your doctor.) To do Kegel exercises:  Squeeze your muscles as if you were trying not to pass gas. Or squeeze your muscles as if you were stopping the flow of urine. Your belly, legs, and buttocks shouldn't move.  Hold the squeeze for 3 seconds, then relax for 5 to 10 seconds.  Start with 3 seconds, then add 1 second each week until you are able to squeeze for 10 seconds.  Repeat the exercise 10 times a session. Do 3 to 8 sessions a day.  When should you call for help?  Watch closely for changes in your health, and be sure to contact your doctor if:    Your incontinence is getting worse.     You do not get better as expected.   Where can you learn more?  Go to https://www.[employer].net/patiented  Enter V684 in the search box to learn more about \"Bladder Training: Care Instructions.\"  Current as of: March 1, 2023               Content Version: 13.7    2447-0695 [employer].   Care instructions adapted under license by your healthcare professional. If you have questions about a medical condition or this instruction, always ask your healthcare professional. [employer] disclaims any warranty or " liability for your use of this information.      Your Health Risk Assessment indicates you feel you are not in good emotional health.    Recreation   Recreation is not limited to sports and team events. It includes any activity that provides relaxation, interest, enjoyment, and exercise. Recreation provides an outlet for physical, mental, and social energy. It can give a sense of worth and achievement. It can help you stay healthy.    Mental Exercise and Social Involvement  Mental and emotional health is as important as physical health. Keep in touch with friends and family. Stay as active as possible. Continue to learn and challenge yourself.   Things you can do to stay mentally active are:  Learn something new, like a foreign language or musical instrument.   Play SCRABBLE or do crossword puzzles. If you cannot find people to play these games with you at home, you can play them with others on your computer through the Internet.   Join a games club--anything from card games to chess or checkers or lawn bowling.   Start a new hobby.   Go back to school.   Volunteer.   Read.   Keep up with world events.  Learning About Depression Screening  What is depression screening?  Depression screening is a way to see if you have depression symptoms. It may be done by a doctor or counselor. It's often part of a routine checkup. That's because your mental health is just as important as your physical health.  Depression is a mental health condition that affects how you feel, think, and act. You may:  Have less energy.  Lose interest in your daily activities.  Feel sad and grouchy for a long time.  Depression is very common. It affects people of all ages.  Many things can lead to depression. Some people become depressed after they have a stroke or find out they have a major illness like cancer or heart disease. The death of a loved one or a breakup may lead to depression. It can run in families. Most experts believe that a  "combination of inherited genes and stressful life events can cause it.  What happens during screening?  You may be asked to fill out a form about your depression symptoms. You and the doctor will discuss your answers. The doctor may ask you more questions to learn more about how you think, act, and feel.  What happens after screening?  If you have symptoms of depression, your doctor will talk to you about your options.  Doctors usually treat depression with medicines or counseling. Often, combining the two works best. Many people don't get help because they think that they'll get over the depression on their own. But people with depression may not get better unless they get treatment.  The cause of depression is not well understood. There may be many factors involved. But if you have depression, it's not your fault.  A serious symptom of depression is thinking about death or suicide. If you or someone you care about talks about this or about feeling hopeless, get help right away.  It's important to know that depression can be treated. Medicine, counseling, and self-care may help.  Where can you learn more?  Go to https://www.[employer].net/patiented  Enter T185 in the search box to learn more about \"Learning About Depression Screening.\"  Current as of: October 20, 2022               Content Version: 13.7    4606-9512 [employer].   Care instructions adapted under license by your healthcare professional. If you have questions about a medical condition or this instruction, always ask your healthcare professional. [employer] disclaims any warranty or liability for your use of this information.         "

## 2023-10-31 NOTE — PROGRESS NOTES
Assessment & Plan     ICD-10-CM    1. Benign essential hypertension  I10 losartan (COZAAR) 25 MG tablet     Lipid Profile     Comprehensive metabolic panel     CBC with Platelets & Differential     Prostate Specific Antigen Screen     Prostate Specific Antigen Screen     CBC with Platelets & Differential     Comprehensive metabolic panel     Lipid Profile      2. Familial hyperlipidemia  E78.49 rosuvastatin (CRESTOR) 20 MG tablet     Lipid Profile     Lipid Profile      3. Coronary artery disease due to lipid rich plaque  I25.10 clopidogrel (PLAVIX) 75 MG tablet    I25.83 empagliflozin (JARDIANCE) 10 MG TABS tablet     losartan (COZAAR) 25 MG tablet     rosuvastatin (CRESTOR) 20 MG tablet     nitroGLYcerin (NITROSTAT) 0.4 MG sublingual tablet     DISCONTINUED: sotalol (BETAPACE) 80 MG tablet      4. History of ST elevation myocardial infarction (STEMI)  I25.2       5. Ischemic heart disease, chronic  I25.9       6. Recurrent major depression in complete remission (H24)  F33.42       7. Vitamin B12 deficiency  E53.8       8. Panic attacks  F41.0 sertraline (ZOLOFT) 50 MG tablet     ALPRAZolam (XANAX) 0.25 MG tablet      9. Generalized anxiety disorder  F41.1 sertraline (ZOLOFT) 50 MG tablet     ALPRAZolam (XANAX) 0.25 MG tablet      10. Vaccine counseling  Z71.85       11. Needs flu shot  Z23       12. Encounter for Medicare annual wellness exam  Z00.00       13. Ischemic heart disease, chronic - EF 45-50% (2022)  I25.9 clopidogrel (PLAVIX) 75 MG tablet     empagliflozin (JARDIANCE) 10 MG TABS tablet     losartan (COZAAR) 25 MG tablet      14. Heart failure with reduced ejection fraction (H)  I50.20 empagliflozin (JARDIANCE) 10 MG TABS tablet     losartan (COZAAR) 25 MG tablet      15. Benign prostatic hyperplasia with nocturia  N40.1 finasteride (PROSCAR) 5 MG tablet    R35.1 tamsulosin (FLOMAX) 0.4 MG capsule     Adult Urology  Referral      16. Paroxysmal supraventricular tachycardia  I47.10 sotalol  (BETAPACE) 80 MG tablet     DISCONTINUED: sotalol (BETAPACE) 80 MG tablet      17. PVC's (premature ventricular contractions)  I49.3 sotalol (BETAPACE) 80 MG tablet     DISCONTINUED: sotalol (BETAPACE) 80 MG tablet      18. Chronic prescription benzodiazepine use  Z79.899 ALPRAZolam (XANAX) 0.25 MG tablet      19. Primary insomnia  F51.01 estazolam (PROSOM) 1 MG tablet      20. Chronic neck pain  M54.2 acetaminophen (TYLENOL) 650 MG CR tablet    G89.29       21. Encounter for screening for malignant neoplasm of prostate  Z12.5 Prostate Specific Antigen Screen     Prostate Specific Antigen Screen      22. Class 1 obesity due to excess calories with serious comorbidity and body mass index (BMI) of 32.0 to 32.9 in adult  E66.09     Z68.32         Patient presents for Medicare annual wellness visit as well as follow-up multiple issues.    Coronary artery disease, history of ST elevation MI in the past.  Has ischemic cardiomyopathy.  Continues with Plavix, Jardiance, losartan, Crestor, sotalol, sublingual nitroglycerin as needed.  Seem to be tolerating well.  Needs refills.  Check lab work.    Coronary artery disease, history of ST elevation MI in the past.  Has chronic ischemic heart disease.  With Ejection fraction 45-50% on most recent echo.also has had issues with PVCs and paroxysmal supraventricular tachycardia.  Continues with sotalol, Jardiance, losartan.  Also taking Plavix, Crestor, sublingual nitroglycerin as needed.  Refill sent to pharmacy.  Reports symptoms are currently stable.      Vitamin B12 deficiency.  Ongoing.  Continues with oral replacement.  Encouraged ongoing dosing.    Panic attacks with generalized anxiety disorder.  Ongoing.  Sertraline and Xanax combination have been helpful and effective.  Tolerating well.  Needs refills.    BPH with nocturia.  Ongoing.  Currently using finasteride, Flomax.  Urology referral placed.    Primary insomnia.  Chronic.  Start trial of ProSom at bedtime.  Uses Xanax  intermittently for anxiety during the day.    Chronic neck pain.  Taking Tylenol.  Start Tylenol arthritis.    Prostate lab cancer screening, check PSA.    HYPERTENSION - Ongoing. Blood pressure is currently well controlled.  Medication side effects: None. Denies syncope or presyncope.  No changes for now. Continue current medications.   Medication list reviewed/updated. Refills completed as needed.      Familial HYPERLIPIDEMIA.  Ongoing. LDL is at goal: No. Triglycerides are at goal: Yes.  Hopefully lifestyle modifications will improve cholesterol levels, otherwise we will need to consider additional medication dose adjustments or medication changes.  Medication side effects reported: None.   Continue current medications for now - Crestor 20 mg daily. Medication list reviewed/updated. Refills completed as needed.  Recent Labs   Lab Test 03/30/23  1009 10/28/21  0816   CHOL 276* 164   HDL 45 48   * 89   TRIG 149 136        DEPRESSION - Patient has a long history of depression of moderate severity requiring medication for control.  Recent symptoms include: anxiety.   Depression course: completely resolved.      OBESITY - Ongoing.  (See Encounter Diagnosis list above for obesity class / severity).  - Counseled on diet and exercise.   - Weight loss would improve Hypertension, Cholesterol and Diabetes.       Vaccine counseling completed.  Encouraged routine / annual vaccinations.     Encouraged regular exercise.     Return in about 1 year (around 10/31/2024) for Annual Medicare Wellness Visit.    Edenilson Adams MD  [facility]      SUBJECTIVE:   Héctor is a 73 year old who presents for Preventive Visit.      10/31/2023     9:43 AM   Additional Questions   Roomed by Maxim ORR / CELINA   Accompanied by n/a       Are you in the first 12 months of your Medicare coverage?  No    Healthy Habits:     In general, how would you rate your overall health?  Good    Frequency of exercise:  2-3 days/week     "Duration of exercise:  15-30 minutes    Do you usually eat at least 4 servings of fruit and vegetables a day, include whole grains    & fiber and avoid regularly eating high fat or \"junk\" foods?  No    Taking medications regularly:  Yes    Barriers to taking medications:  None    Medication side effects:  Muscle aches    Ability to successfully perform activities of daily living:  No assistance needed    Home Safety:  No safety concerns identified    Hearing Impairment:  Find that men's voices are easier to understand than woman's and difficulty understanding soft or whispered speech    In the past 6 months, have you been bothered by leaking of urine? Yes    In general, how would you rate your overall mental or emotional health?  Fair    Additional concerns today:  Yes      Today's PHQ-9 Score:       10/31/2023     9:24 AM   PHQ-9 SCORE   PHQ-9 Total Score MyChart 13 (Moderate depression)   PHQ-9 Total Score 13           Have you ever done Advance Care Planning? (For example, a Health Directive, POLST, or a discussion with a medical provider or your loved ones about your wishes): Yes, advance care planning is on file.     Fall risk  Fallen 2 or more times in the past year?: No  Any fall with injury in the past year?: No    Cognitive Screening   1) Repeat 3 items (Leader, Season, Table)    2) Clock draw: NORMAL  3) 3 item recall: Recalls 3 objects  Results: 3 items recalled: COGNITIVE IMPAIRMENT LESS LIKELY    Mini-CogTM Copyright S Satinder. Licensed by the author for use in [facility]; reprinted with permission ([email or web address]). All rights reserved.      Do you have sleep apnea, excessive snoring or daytime drowsiness? : no    Reviewed and updated as needed this visit by clinical staff   Tobacco  Allergies  Meds  Problems  Med Hx  Surg Hx  Fam Hx  Soc   Hx        Reviewed and updated as needed this visit by Provider   Tobacco  Allergies  Meds  Problems  Med Hx  Surg Hx  Fam Hx         Social " History     Tobacco Use    Smoking status: Former     Types: Pipe, Dip, chew, snus or snuff    Smokeless tobacco: Current     Types: Chew     Last attempt to quit: 1/1/1979    Tobacco comments:     Quit smoking: -- No longer using cigarettes, occasionally smokes a pipe   Substance Use Topics    Alcohol use: No     Alcohol/week: 0.0 standard drinks of alcohol             10/27/2023     1:50 PM   Alcohol Use   Prescreen: >3 drinks/day or >7 drinks/week? Not Applicable     Do you have a current opioid prescription? No  Do you use any other controlled substances or medications that are not prescribed by a provider? None    Current providers sharing in care for this patient include:   Patient Care Team:  Edenilson Adams MD as PCP - General (Internal Medicine)  Edenilson Adams MD as Assigned PCP    The following health maintenance items are reviewed in Epic and correct as of today:  Health Maintenance   Topic Date Due    ZOSTER IMMUNIZATION (1 of 2) Never done    RSV VACCINE (Pregnancy & 60+) (1 - 1-dose 60+ series) Never done    HF ACTION PLAN  06/05/2021    COVID-19 Vaccine (2 - 2023-24 season) 09/01/2023    DTAP/TDAP/TD IMMUNIZATION (2 - Td or Tdap) 12/11/2023    BMP  04/30/2024    PHQ-9  04/30/2024    MEDICARE ANNUAL WELLNESS VISIT  10/31/2024    ALT  10/31/2024    LIPID  10/31/2024    FALL RISK ASSESSMENT  10/31/2024    CBC  10/31/2024    COLORECTAL CANCER SCREENING  11/11/2024    ADVANCE CARE PLANNING  11/01/2028    TSH W/FREE T4 REFLEX  Completed    INFLUENZA VACCINE  Completed    Pneumococcal Vaccine: 65+ Years  Completed    AORTIC ANEURYSM SCREENING (SYSTEM ASSIGNED)  Completed    HEPATITIS C SCREENING  Addressed    DEPRESSION ACTION PLAN  Addressed    IPV IMMUNIZATION  Aged Out    HPV IMMUNIZATION  Aged Out    MENINGITIS IMMUNIZATION  Aged Out    RSV MONOCLONAL ANTIBODY  Aged Out    LUNG CANCER SCREENING  Discontinued     Review of Systems   Constitutional:  Positive for chills. Negative for fever.   HENT:   "Positive for congestion. Negative for ear pain, hearing loss and sore throat.    Eyes:  Negative for pain and visual disturbance.   Respiratory:  Negative for cough and shortness of breath.    Cardiovascular:  Positive for peripheral edema. Negative for chest pain and palpitations.   Gastrointestinal:  Positive for diarrhea. Negative for abdominal pain, constipation, heartburn, hematochezia and nausea.   Genitourinary:  Positive for frequency, impotence and urgency. Negative for dysuria, genital sores, hematuria and penile discharge.   Musculoskeletal:  Positive for arthralgias and myalgias. Negative for joint swelling.   Skin:  Negative for rash.   Allergic/Immunologic: Negative for immunocompromised state.   Neurological:  Negative for dizziness, weakness, headaches and paresthesias.   Hematological:  Does not bruise/bleed easily.   Psychiatric/Behavioral:  Positive for mood changes. The patient is nervous/anxious.        OBJECTIVE:   /72 (BP Location: Right arm, Patient Position: Sitting, Cuff Size: Adult Small)   Pulse 57   Temp 97  F (36.1  C) (Temporal)   Resp 20   Ht 1.765 m (5' 9.5\")   Wt 101.2 kg (223 lb)   SpO2 97%   BMI 32.46 kg/m   Estimated body mass index is 32.46 kg/m  as calculated from the following:    Height as of this encounter: 1.765 m (5' 9.5\").    Weight as of this encounter: 101.2 kg (223 lb).  Physical Exam  Constitutional:       General: He is not in acute distress.     Appearance: He is well-developed. He is not diaphoretic.   HENT:      Head: Normocephalic and atraumatic.   Eyes:      General: No scleral icterus.     Conjunctiva/sclera: Conjunctivae normal.   Neck:      Vascular: No carotid bruit.   Cardiovascular:      Rate and Rhythm: Normal rate and regular rhythm.      Pulses: Normal pulses.   Pulmonary:      Effort: Pulmonary effort is normal.      Breath sounds: Wheezing present.   Abdominal:      General: Bowel sounds are normal.      Palpations: Abdomen is soft.      " Tenderness: There is no abdominal tenderness.   Musculoskeletal:         General: No tenderness or deformity. Normal range of motion.      Cervical back: Neck supple.      Right lower leg: Pitting Edema (trace) present.      Left lower leg: Pitting Edema (trace) present.   Lymphadenopathy:      Cervical: No cervical adenopathy.   Skin:     General: Skin is warm and dry.      Findings: No rash.   Neurological:      Mental Status: He is alert and oriented to person, place, and time. Mental status is at baseline.   Psychiatric:         Mood and Affect: Mood normal.         Behavior: Behavior normal.           Diagnostic Test Results:  Labs reviewed in Epic    Patient has been advised of split billing requirements and indicates understanding: Yes      COUNSELING:  Reviewed preventive health counseling, as reflected in patient instructions  Special attention given to:       Consider AAA screening for ages 65-75 and smoking history       Regular exercise       Healthy diet/nutrition       Vision screening       Hearing screening       Dental care       Bladder control       Fall risk prevention       Immunizations    He reports that he has quit smoking. His smoking use included pipe and dip, chew, snus or snuff. His smokeless tobacco use includes chew.  Nicotine/Tobacco Cessation Plan:   Information offered: Patient not interested at this time      Appropriate preventive services were discussed with this patient, including applicable screening as appropriate for fall prevention, nutrition, physical activity, Tobacco-use cessation, weight loss and cognition.  Checklist reviewing preventive services available has been given to the patient.    Reviewed patients plan of care and provided an AVS. The Complex Care Plan (for patients with higher acuity and needing more deliberate coordination of services) for Héctor meets the Care Plan requirement. This Care Plan has been established and reviewed with the Patient.          Edenilson  MD [place]  [facility]    Identified Health Risks:  I have reviewed Opioid Use Disorder and Substance Use Disorder risk factors and made any needed referrals.     Answers submitted by the patient for this visit:  Patient Health Questionnaire (Submitted on 10/31/2023)  If you checked off any problems, how difficult have these problems made it for you to do your work, take care of things at home, or get along with other people?: Somewhat difficult  PHQ9 TOTAL SCORE: 13  The patient was counseled and encouraged to consider modifying their diet and eating habits. He was provided with information on recommended healthy diet options.  The patient was provided with written information regarding signs of hearing loss.  Information on urinary incontinence and treatment options given to patient.  The patient was provided with suggestions to help him develop a healthy emotional lifestyle.  The patient s PHQ-9 score is consistent with moderate depression. He was provided with information regarding depression and was advised to schedule a follow up appointment in 4-12 weeks to further address this issue.

## 2023-10-31 NOTE — NURSING NOTE
"Chief Complaint   Patient presents with    Medicare Wellness Visit       Initial /72 (BP Location: Right arm, Patient Position: Sitting, Cuff Size: Adult Small)   Pulse 57   Temp 97  F (36.1  C) (Temporal)   Resp 20   Ht 1.765 m (5' 9.5\")   SpO2 97%   BMI 36.48 kg/m   Estimated body mass index is 36.48 kg/m  as calculated from the following:    Height as of this encounter: 1.765 m (5' 9.5\").    Weight as of 10/28/22: 113.7 kg (250 lb 9.6 oz).  Medication Review: complete    The next two questions are to help us understand your food security.  If you are feeling you need any assistance in this area, we have resources available to support you today.          10/27/2023   SDOH- Food Insecurity   Within the past 12 months, did you worry that your food would run out before you got money to buy more? N   Within the past 12 months, did the food you bought just not last and you didn t have money to get more? N         Health Care Directive:  Patient does not have a Health Care Directive or Living Will: Advance Directive received and scanned. Click on Code in the patient header to view.    Maxim Lewis      "

## 2023-11-01 PROBLEM — E66.811 CLASS 1 OBESITY DUE TO EXCESS CALORIES WITH SERIOUS COMORBIDITY AND BODY MASS INDEX (BMI) OF 32.0 TO 32.9 IN ADULT: Status: ACTIVE | Noted: 2023-11-01

## 2023-11-01 PROBLEM — E66.09 CLASS 1 OBESITY DUE TO EXCESS CALORIES WITH SERIOUS COMORBIDITY AND BODY MASS INDEX (BMI) OF 32.0 TO 32.9 IN ADULT: Status: ACTIVE | Noted: 2023-11-01

## 2023-11-07 DIAGNOSIS — I25.9 ISCHEMIC HEART DISEASE, CHRONIC: ICD-10-CM

## 2023-11-07 DIAGNOSIS — I25.10 CORONARY ARTERY DISEASE DUE TO LIPID RICH PLAQUE: ICD-10-CM

## 2023-11-07 DIAGNOSIS — I25.83 CORONARY ARTERY DISEASE DUE TO LIPID RICH PLAQUE: ICD-10-CM

## 2023-11-13 RX ORDER — CLOPIDOGREL BISULFATE 75 MG/1
TABLET ORAL
Qty: 90 TABLET | Refills: 0 | OUTPATIENT
Start: 2023-11-13

## 2023-11-13 NOTE — TELEPHONE ENCOUNTER
"[facility] sent Rx request for the following:      Requested Prescriptions   Pending Prescriptions Disp Refills    clopidogrel (PLAVIX) 75 MG tablet [Pharmacy Med Name: CLOPIDOGREL 75 MG TABLET] 90 tablet 0     Sig: TAKE 1 TABLET BY MOUTH DAILY       Plavix Passed - 11/7/2023  8:28 AM        Passed - No active PPI on record unless is Protonix        Passed - Normal HGB on file in past 12 months     Recent Labs   Lab Test 10/31/23  1033   HGB 16.4               Passed - Normal Platelets on file in past 12 months     Recent Labs   Lab Test 10/31/23  1033                  Passed - Recent (12 mo) or future (30 days) visit within the authorizing provider's specialty     Patient has had an office visit with the authorizing provider or a provider within the authorizing providers department within the previous 12 mos or has a future within next 30 days. See \"Patient Info\" tab in inbasket, or \"Choose Columns\" in Meds & Orders section of the refill encounter.              Passed - Medication is active on med list        Passed - Patient is age 18 or older             Last Prescription Date:   10/31/2023  Last Fill Qty/Refills:         90, R-4    Last Office Visit:              10/31/2023   Future Office visit:           10/29/2024    Redundant refill request refused: Too soon:  Refills sent to [facility] pharmacy on 10/31/2023. [facility] requesting refills on 11/7/2023. Attempted to reach patient to clarify which pharmacy he would like his Rx filled at. Unable to reach patient. Call placed to [facility] to notify pharmacy staff that patient has refills on file at [facility] pharmacy available for transfer if he is requesting to pick-up refills at [facility]. [facility] staff verbalizes that they will note this in his file.     Pastora Fowler RN on 11/13/2023 at 10:30 AM         "

## 2024-05-06 DIAGNOSIS — I25.83 CORONARY ARTERY DISEASE DUE TO LIPID RICH PLAQUE: ICD-10-CM

## 2024-05-06 DIAGNOSIS — I25.9 ISCHEMIC HEART DISEASE, CHRONIC: ICD-10-CM

## 2024-05-06 DIAGNOSIS — I25.10 CORONARY ARTERY DISEASE DUE TO LIPID RICH PLAQUE: ICD-10-CM

## 2024-05-10 NOTE — TELEPHONE ENCOUNTER
[facility] sent Rx request for the following:      Requested Prescriptions   Pending Prescriptions Disp Refills    clopidogrel (PLAVIX) 75 MG tablet [Pharmacy Med Name: clopidogrel 75 mg tablet] 90 tablet 4     Sig: TAKE 1 TABLET BY MOUTH DAILY       Plavix Failed - 5/6/2024 11:17 AM        Failed - Medication indicated for associated diagnosis     Medication is associated with one or more of the following diagnoses:  Cerebrovascular accident  Myocardial infarction  Percutaneous coronary intervention  Peripheral arterial occlusive disease          Passed - Normal HGB on file in past 12 months     Recent Labs   Lab Test 10/31/23  1033   HGB 16.4               Passed - Normal Platelets on file in past 12 months     Recent Labs   Lab Test 10/31/23  1033                  Passed - Medication is active on med list        Passed - Recent (12 mo) or future (90 days) visit within the authorizing provider's specialty     The patient must have completed an in-person or virtual visit within the past 12 months or has a future visit scheduled within the next 90 days with the authorizing provider s specialty.  Urgent care and e-visits do not quality as an office visit for this protocol.          Passed - Patient is age 18 or older             Last Prescription Date:   10/31/2023  Last Fill Qty/Refills:         90, R-4    Last Office Visit:              10/31/2023 Bryan   Future Office visit:                Unable to complete prescription refill per RN Medication Refill Policy    Patria Sylvester RN on 5/10/2024 at 4:55 PM

## 2024-05-13 RX ORDER — CLOPIDOGREL BISULFATE 75 MG/1
TABLET ORAL
Qty: 90 TABLET | Refills: 4 | Status: SHIPPED | OUTPATIENT
Start: 2024-05-13

## 2024-08-23 DIAGNOSIS — Z87.891 HISTORY OF TOBACCO ABUSE: ICD-10-CM

## 2024-08-26 RX ORDER — ALBUTEROL SULFATE 90 UG/1
AEROSOL, METERED RESPIRATORY (INHALATION)
Qty: 18 G | Refills: 0 | Status: SHIPPED | OUTPATIENT
Start: 2024-08-26

## 2024-08-26 NOTE — TELEPHONE ENCOUNTER
Reason for call: Medication or medication refill    Have you contacted your pharmacy regarding this refill? Yes    If No, direct the patient to contact the Pharmacy and discontinue telephone note using Erroneous Encounter.  If Yes, continue.    Name of medication requested: albuterol    How many days of medication do you have left? 0 days - on hand are     What pharmacy do you use? [facility]    Preferred method for responding to this message: Telephone Call    Phone number patient can be reached at: Cell number on file:    Telephone Information:   Mobile [phone]       If we cannot reach you directly, may we leave a detailed response at the number you provided? Yes    Sandra Vazquez on 2024 at 9:09 AM

## 2024-08-26 NOTE — TELEPHONE ENCOUNTER
Requested Prescriptions   Pending Prescriptions Disp Refills    albuterol (PROAIR HFA/PROVENTIL HFA/VENTOLIN HFA) 108 (90 Base) MCG/ACT inhaler [Pharmacy Med Name: ALBUTEROL SUL HFA 90 MCG INH] 18 g 0     Sig: INHALE 1 TO 2 PUFFS INTO THE LUNGS EVERY 4 HOURS AS NEEDED FOR SHORTNESS OF BREATH/DYSPNEA/ WHEEZING       Short-Acting Beta Agonist Inhalers Protocol  Failed - 8/26/2024  9:10 AM        Failed - Recent (12 mo) or future (30 days) visit within the authorizing provider's specialty     Last Prescription Date:   8/10/23  Last Fill Qty/Refills:         54 g, R-0    Last Office Visit:              10/31/23 (Physical)   Future Office visit:             Next 5 appointments (look out 90 days)      Oct 29, 2024 10:40 AM  (Arrive by 10:25 AM)  PHYSICAL with Edenilson Adams MD  [facility] ([facility] ) [address]  [place] [id number] [phone]     Unable to complete prescription refill per RN Medication Refill Policy. Routing to covering provider for refill consideration, as PCP/provider is out of clinic >48 hours or Pt is completely out of medication and provider is out of the clinic today. Nancy Barahona RN .............. 8/26/2024  9:11 AM

## 2024-10-24 ASSESSMENT — ANXIETY QUESTIONNAIRES
7. FEELING AFRAID AS IF SOMETHING AWFUL MIGHT HAPPEN: SEVERAL DAYS
GAD7 TOTAL SCORE: 6
1. FEELING NERVOUS, ANXIOUS, OR ON EDGE: SEVERAL DAYS
4. TROUBLE RELAXING: SEVERAL DAYS
IF YOU CHECKED OFF ANY PROBLEMS ON THIS QUESTIONNAIRE, HOW DIFFICULT HAVE THESE PROBLEMS MADE IT FOR YOU TO DO YOUR WORK, TAKE CARE OF THINGS AT HOME, OR GET ALONG WITH OTHER PEOPLE: SOMEWHAT DIFFICULT
GAD7 TOTAL SCORE: 6
GAD7 TOTAL SCORE: 6
7. FEELING AFRAID AS IF SOMETHING AWFUL MIGHT HAPPEN: SEVERAL DAYS
5. BEING SO RESTLESS THAT IT IS HARD TO SIT STILL: NOT AT ALL
6. BECOMING EASILY ANNOYED OR IRRITABLE: SEVERAL DAYS
8. IF YOU CHECKED OFF ANY PROBLEMS, HOW DIFFICULT HAVE THESE MADE IT FOR YOU TO DO YOUR WORK, TAKE CARE OF THINGS AT HOME, OR GET ALONG WITH OTHER PEOPLE?: SOMEWHAT DIFFICULT
3. WORRYING TOO MUCH ABOUT DIFFERENT THINGS: SEVERAL DAYS
2. NOT BEING ABLE TO STOP OR CONTROL WORRYING: SEVERAL DAYS

## 2024-10-29 ENCOUNTER — HOSPITAL ENCOUNTER (OUTPATIENT)
Dept: GENERAL RADIOLOGY | Facility: OTHER | Age: 74
Discharge: HOME | End: 2024-10-29
Attending: INTERNAL MEDICINE
Payer: MEDICARE

## 2024-10-29 ENCOUNTER — OFFICE VISIT (OUTPATIENT)
Dept: INTERNAL MEDICINE | Facility: OTHER | Age: 74
End: 2024-10-29
Attending: INTERNAL MEDICINE
Payer: COMMERCIAL

## 2024-10-29 VITALS
HEART RATE: 78 BPM | OXYGEN SATURATION: 93 % | BODY MASS INDEX: 33.04 KG/M2 | RESPIRATION RATE: 18 BRPM | SYSTOLIC BLOOD PRESSURE: 122 MMHG | HEIGHT: 70 IN | DIASTOLIC BLOOD PRESSURE: 64 MMHG | WEIGHT: 230.8 LBS

## 2024-10-29 DIAGNOSIS — N40.1 BPH WITH LOWER URINARY TRACT SYMPTOMS WITHOUT URINARY OBSTRUCTION: ICD-10-CM

## 2024-10-29 DIAGNOSIS — J41.0 SIMPLE CHRONIC BRONCHITIS (H): ICD-10-CM

## 2024-10-29 DIAGNOSIS — R79.0 LOW BLOOD MAGNESIUM: ICD-10-CM

## 2024-10-29 DIAGNOSIS — M25.552 HIP PAIN, LEFT: ICD-10-CM

## 2024-10-29 DIAGNOSIS — I47.10 PAROXYSMAL SUPRAVENTRICULAR TACHYCARDIA (H): ICD-10-CM

## 2024-10-29 DIAGNOSIS — M70.72 ISCHIAL BURSITIS OF LEFT SIDE: ICD-10-CM

## 2024-10-29 DIAGNOSIS — I25.83 CORONARY ARTERY DISEASE DUE TO LIPID RICH PLAQUE: ICD-10-CM

## 2024-10-29 DIAGNOSIS — Z00.00 MEDICARE ANNUAL WELLNESS VISIT, SUBSEQUENT: Primary | ICD-10-CM

## 2024-10-29 DIAGNOSIS — E53.8 VITAMIN B12 DEFICIENCY: ICD-10-CM

## 2024-10-29 DIAGNOSIS — I50.20 HEART FAILURE WITH REDUCED EJECTION FRACTION (H): ICD-10-CM

## 2024-10-29 DIAGNOSIS — E66.811 CLASS 1 OBESITY DUE TO EXCESS CALORIES WITH SERIOUS COMORBIDITY AND BODY MASS INDEX (BMI) OF 33.0 TO 33.9 IN ADULT: ICD-10-CM

## 2024-10-29 DIAGNOSIS — I25.10 CORONARY ARTERY DISEASE DUE TO LIPID RICH PLAQUE: ICD-10-CM

## 2024-10-29 DIAGNOSIS — F33.42 RECURRENT MAJOR DEPRESSION IN COMPLETE REMISSION (H): ICD-10-CM

## 2024-10-29 DIAGNOSIS — M70.62 TROCHANTERIC BURSITIS OF LEFT HIP: ICD-10-CM

## 2024-10-29 DIAGNOSIS — Z71.85 VACCINE COUNSELING: ICD-10-CM

## 2024-10-29 DIAGNOSIS — E55.9 VITAMIN D DEFICIENCY: ICD-10-CM

## 2024-10-29 DIAGNOSIS — F51.01 PRIMARY INSOMNIA: ICD-10-CM

## 2024-10-29 DIAGNOSIS — E78.49 FAMILIAL HYPERLIPIDEMIA: ICD-10-CM

## 2024-10-29 DIAGNOSIS — I10 BENIGN ESSENTIAL HYPERTENSION: ICD-10-CM

## 2024-10-29 DIAGNOSIS — E66.09 CLASS 1 OBESITY DUE TO EXCESS CALORIES WITH SERIOUS COMORBIDITY AND BODY MASS INDEX (BMI) OF 33.0 TO 33.9 IN ADULT: ICD-10-CM

## 2024-10-29 DIAGNOSIS — Z79.899 CHRONIC PRESCRIPTION BENZODIAZEPINE USE: ICD-10-CM

## 2024-10-29 DIAGNOSIS — I25.9 ISCHEMIC HEART DISEASE, CHRONIC: ICD-10-CM

## 2024-10-29 DIAGNOSIS — I49.3 PVC'S (PREMATURE VENTRICULAR CONTRACTIONS): ICD-10-CM

## 2024-10-29 DIAGNOSIS — F41.1 GENERALIZED ANXIETY DISORDER: ICD-10-CM

## 2024-10-29 DIAGNOSIS — Z12.5 ENCOUNTER FOR SCREENING FOR MALIGNANT NEOPLASM OF PROSTATE: ICD-10-CM

## 2024-10-29 DIAGNOSIS — F41.0 PANIC ATTACKS: ICD-10-CM

## 2024-10-29 LAB
ALBUMIN SERPL BCG-MCNC: 4.3 G/DL (ref 3.5–5.2)
ALBUMIN UR-MCNC: NEGATIVE MG/DL
ALP SERPL-CCNC: 66 U/L (ref 40–150)
ALT SERPL W P-5'-P-CCNC: 12 U/L (ref 0–70)
ANION GAP SERPL CALCULATED.3IONS-SCNC: 7 MMOL/L (ref 7–15)
APPEARANCE UR: CLEAR
AST SERPL W P-5'-P-CCNC: 20 U/L (ref 0–45)
BASOPHILS # BLD AUTO: 0.1 10E3/UL (ref 0–0.2)
BASOPHILS NFR BLD AUTO: 2 %
BILIRUB SERPL-MCNC: 0.5 MG/DL
BILIRUB UR QL STRIP: NEGATIVE
BUN SERPL-MCNC: 20.7 MG/DL (ref 8–23)
CALCIUM SERPL-MCNC: 10 MG/DL (ref 8.8–10.4)
CHLORIDE SERPL-SCNC: 99 MMOL/L (ref 98–107)
CHOLEST SERPL-MCNC: 176 MG/DL
COLOR UR AUTO: ABNORMAL
CREAT SERPL-MCNC: 0.97 MG/DL (ref 0.67–1.17)
CREAT UR-MCNC: 91.4 MG/DL
EGFRCR SERPLBLD CKD-EPI 2021: 83 ML/MIN/1.73M2
EOSINOPHIL # BLD AUTO: 0.2 10E3/UL (ref 0–0.7)
EOSINOPHIL NFR BLD AUTO: 3 %
ERYTHROCYTE [DISTWIDTH] IN BLOOD BY AUTOMATED COUNT: 12.2 % (ref 10–15)
FASTING STATUS PATIENT QL REPORTED: NO
FASTING STATUS PATIENT QL REPORTED: NO
GLUCOSE SERPL-MCNC: 91 MG/DL (ref 70–99)
GLUCOSE UR STRIP-MCNC: >1000 MG/DL
HCO3 SERPL-SCNC: 31 MMOL/L (ref 22–29)
HCT VFR BLD AUTO: 50.5 % (ref 40–53)
HDLC SERPL-MCNC: 55 MG/DL
HGB BLD-MCNC: 16.8 G/DL (ref 13.3–17.7)
HGB UR QL STRIP: ABNORMAL
IMM GRANULOCYTES # BLD: 0 10E3/UL
IMM GRANULOCYTES NFR BLD: 0 %
KETONES UR STRIP-MCNC: NEGATIVE MG/DL
LDLC SERPL CALC-MCNC: 93 MG/DL
LEUKOCYTE ESTERASE UR QL STRIP: NEGATIVE
LYMPHOCYTES # BLD AUTO: 1.8 10E3/UL (ref 0.8–5.3)
LYMPHOCYTES NFR BLD AUTO: 30 %
MAGNESIUM SERPL-MCNC: 2.4 MG/DL (ref 1.7–2.3)
MCH RBC QN AUTO: 32.1 PG (ref 26.5–33)
MCHC RBC AUTO-ENTMCNC: 33.3 G/DL (ref 31.5–36.5)
MCV RBC AUTO: 96 FL (ref 78–100)
MICROALBUMIN UR-MCNC: <12 MG/L
MICROALBUMIN/CREAT UR: NORMAL MG/G{CREAT}
MONOCYTES # BLD AUTO: 0.6 10E3/UL (ref 0–1.3)
MONOCYTES NFR BLD AUTO: 10 %
MUCOUS THREADS #/AREA URNS LPF: PRESENT /LPF
NEUTROPHILS # BLD AUTO: 3.3 10E3/UL (ref 1.6–8.3)
NEUTROPHILS NFR BLD AUTO: 55 %
NITRATE UR QL: NEGATIVE
NONHDLC SERPL-MCNC: 121 MG/DL
NRBC # BLD AUTO: 0 10E3/UL
NRBC BLD AUTO-RTO: 0 /100
PH UR STRIP: 6 [PH] (ref 5–9)
PLATELET # BLD AUTO: 270 10E3/UL (ref 150–450)
POTASSIUM SERPL-SCNC: 4.6 MMOL/L (ref 3.4–5.3)
PROT SERPL-MCNC: 6.7 G/DL (ref 6.4–8.3)
PSA SERPL DL<=0.01 NG/ML-MCNC: 1.15 NG/ML (ref 0–6.5)
RBC # BLD AUTO: 5.24 10E6/UL (ref 4.4–5.9)
RBC URINE: 4 /HPF
SODIUM SERPL-SCNC: 137 MMOL/L (ref 135–145)
SP GR UR STRIP: 1.03 (ref 1–1.03)
TRIGL SERPL-MCNC: 141 MG/DL
UROBILINOGEN UR STRIP-MCNC: NORMAL MG/DL
VIT B12 SERPL-MCNC: 950 PG/ML (ref 232–1245)
WBC # BLD AUTO: 5.9 10E3/UL (ref 4–11)
WBC URINE: <1 /HPF

## 2024-10-29 PROCEDURE — 36415 COLL VENOUS BLD VENIPUNCTURE: CPT | Mod: ZL | Performed by: INTERNAL MEDICINE

## 2024-10-29 PROCEDURE — G0008 ADMIN INFLUENZA VIRUS VAC: HCPCS

## 2024-10-29 PROCEDURE — G0439 PPPS, SUBSEQ VISIT: HCPCS | Performed by: INTERNAL MEDICINE

## 2024-10-29 PROCEDURE — G0103 PSA SCREENING: HCPCS | Mod: ZL | Performed by: INTERNAL MEDICINE

## 2024-10-29 PROCEDURE — 81001 URINALYSIS AUTO W/SCOPE: CPT | Mod: ZL | Performed by: INTERNAL MEDICINE

## 2024-10-29 PROCEDURE — 85004 AUTOMATED DIFF WBC COUNT: CPT | Mod: ZL | Performed by: INTERNAL MEDICINE

## 2024-10-29 PROCEDURE — 73502 X-RAY EXAM HIP UNI 2-3 VIEWS: CPT

## 2024-10-29 PROCEDURE — 82947 ASSAY GLUCOSE BLOOD QUANT: CPT | Mod: ZL | Performed by: INTERNAL MEDICINE

## 2024-10-29 PROCEDURE — 85014 HEMATOCRIT: CPT | Mod: ZL | Performed by: INTERNAL MEDICINE

## 2024-10-29 PROCEDURE — 82465 ASSAY BLD/SERUM CHOLESTEROL: CPT | Mod: ZL | Performed by: INTERNAL MEDICINE

## 2024-10-29 PROCEDURE — 82607 VITAMIN B-12: CPT | Mod: ZL | Performed by: INTERNAL MEDICINE

## 2024-10-29 PROCEDURE — 82306 VITAMIN D 25 HYDROXY: CPT | Mod: ZL | Performed by: INTERNAL MEDICINE

## 2024-10-29 PROCEDURE — 82043 UR ALBUMIN QUANTITATIVE: CPT | Mod: ZL | Performed by: INTERNAL MEDICINE

## 2024-10-29 PROCEDURE — 99214 OFFICE O/P EST MOD 30 MIN: CPT | Mod: 25 | Performed by: INTERNAL MEDICINE

## 2024-10-29 PROCEDURE — 83735 ASSAY OF MAGNESIUM: CPT | Mod: ZL | Performed by: INTERNAL MEDICINE

## 2024-10-29 PROCEDURE — G0463 HOSPITAL OUTPT CLINIC VISIT: HCPCS | Mod: 25

## 2024-10-29 RX ORDER — LOSARTAN POTASSIUM 25 MG/1
25 TABLET ORAL DAILY
Qty: 90 TABLET | Refills: 4 | Status: SHIPPED | OUTPATIENT
Start: 2024-10-29

## 2024-10-29 RX ORDER — ESCITALOPRAM OXALATE 20 MG/1
20 TABLET ORAL DAILY
Qty: 90 TABLET | Refills: 4 | Status: SHIPPED | OUTPATIENT
Start: 2024-10-29

## 2024-10-29 RX ORDER — ALBUTEROL SULFATE 90 UG/1
1-2 INHALANT RESPIRATORY (INHALATION) EVERY 4 HOURS PRN
Qty: 18 G | Refills: 4 | Status: SHIPPED | OUTPATIENT
Start: 2024-10-29

## 2024-10-29 RX ORDER — ROSUVASTATIN CALCIUM 20 MG/1
20 TABLET, COATED ORAL DAILY
Qty: 90 TABLET | Refills: 4 | Status: SHIPPED | OUTPATIENT
Start: 2024-10-29

## 2024-10-29 RX ORDER — TIOTROPIUM BROMIDE 18 UG/1
18 CAPSULE ORAL; RESPIRATORY (INHALATION) DAILY
Qty: 90 CAPSULE | Refills: 4 | Status: SHIPPED | OUTPATIENT
Start: 2024-10-29

## 2024-10-29 RX ORDER — CLOPIDOGREL BISULFATE 75 MG/1
TABLET ORAL
Qty: 90 TABLET | Refills: 4 | Status: SHIPPED | OUTPATIENT
Start: 2024-10-29

## 2024-10-29 RX ORDER — FINASTERIDE 5 MG/1
5 TABLET, FILM COATED ORAL DAILY
Qty: 90 TABLET | Refills: 4 | Status: SHIPPED | OUTPATIENT
Start: 2024-10-29

## 2024-10-29 RX ORDER — SOTALOL HYDROCHLORIDE 80 MG/1
80 TABLET ORAL 2 TIMES DAILY
Qty: 180 TABLET | Refills: 4 | Status: SHIPPED | OUTPATIENT
Start: 2024-10-29

## 2024-10-29 RX ORDER — ALPRAZOLAM 0.25 MG/1
.125-.25 TABLET ORAL DAILY PRN
Qty: 40 TABLET | Refills: 2 | Status: SHIPPED | OUTPATIENT
Start: 2024-10-29

## 2024-10-29 RX ORDER — TAMSULOSIN HYDROCHLORIDE 0.4 MG/1
0.4 CAPSULE ORAL 2 TIMES DAILY
Qty: 180 CAPSULE | Refills: 4 | Status: SHIPPED | OUTPATIENT
Start: 2024-10-29

## 2024-10-29 RX ORDER — ESTAZOLAM 1 MG
.5-1 TABLET ORAL AT BEDTIME
Qty: 90 TABLET | Refills: 1 | Status: SHIPPED | OUTPATIENT
Start: 2024-10-29

## 2024-10-29 ASSESSMENT — ENCOUNTER SYMPTOMS
DIZZINESS: 0
DYSURIA: 0
DIFFICULTY URINATING: 1
SHORTNESS OF BREATH: 1
FREQUENCY: 1
VOMITING: 0
CHILLS: 0
NERVOUS/ANXIOUS: 1
ARTHRALGIAS: 1
ABDOMINAL PAIN: 0
HEMATURIA: 0
NUMBNESS: 1
NECK PAIN: 1
AGITATION: 0
PALPITATIONS: 0
FEVER: 0
COUGH: 1
NAUSEA: 0
WOUND: 0
DIARRHEA: 0
BRUISES/BLEEDS EASILY: 0
WHEEZING: 0
PARESTHESIAS: 1
MYALGIAS: 1
CONFUSION: 0
SLEEP DISTURBANCE: 1
LIGHT-HEADEDNESS: 0

## 2024-10-29 ASSESSMENT — PAIN SCALES - GENERAL: PAINLEVEL_OUTOF10: NO PAIN (0)

## 2024-10-29 NOTE — NURSING NOTE
"Chief Complaint   Patient presents with    Medicare Annual Exam       Initial /64   Pulse 78   Resp 18   Ht 1.765 m (5' 9.5\")   Wt 104.7 kg (230 lb 12.8 oz)   SpO2 93%   BMI 33.59 kg/m   Estimated body mass index is 33.59 kg/m  as calculated from the following:    Height as of this encounter: 1.765 m (5' 9.5\").    Weight as of this encounter: 104.7 kg (230 lb 12.8 oz).  Medication Review: complete    The next two questions are to help us understand your food security.  If you are feeling you need any assistance in this area, we have resources available to support you today.          10/27/2023   SDOH- Food Insecurity   Within the past 12 months, did you worry that your food would run out before you got money to buy more? N    Within the past 12 months, did the food you bought just not last and you didn t have money to get more? N        Patient-reported         Health Care Directive:  Patient does not have a Health Care Directive: Discussed advance care planning with patient; however, patient declined at this time.    Jemima Yeager LPN      "

## 2024-10-29 NOTE — PROGRESS NOTES
Assessment & Plan     ICD-10-CM    1. Medicare annual wellness visit, subsequent  Z00.00       2. Vaccine counseling  Z71.85       3. Benign essential hypertension  I10 losartan (COZAAR) 25 MG tablet     CBC with Platelets & Differential     Comprehensive metabolic panel     CBC with Platelets & Differential     Comprehensive metabolic panel      4. Class 1 obesity due to excess calories with serious comorbidity and body mass index (BMI) of 33.0 to 33.9 in adult  E66.811     E66.09     Z68.33       5. Coronary artery disease due to lipid rich plaque  I25.10 clopidogrel (PLAVIX) 75 MG tablet    I25.83 empagliflozin (JARDIANCE) 10 MG TABS tablet     losartan (COZAAR) 25 MG tablet     rosuvastatin (CRESTOR) 20 MG tablet      6. Familial hyperlipidemia  E78.49 rosuvastatin (CRESTOR) 20 MG tablet     Lipid Profile     Lipid Profile      7. Ischemic heart disease, chronic  I25.9       8. Generalized anxiety disorder  F41.1 ALPRAZolam (XANAX) 0.25 MG tablet     escitalopram (LEXAPRO) 20 MG tablet      9. Recurrent major depression in complete remission (H)  F33.42       10. Panic attacks  F41.0 ALPRAZolam (XANAX) 0.25 MG tablet     escitalopram (LEXAPRO) 20 MG tablet      11. Chronic prescription benzodiazepine use  Z79.899 ALPRAZolam (XANAX) 0.25 MG tablet      12. Ischemic heart disease, chronic - EF 45-50% (2022)  I25.9 clopidogrel (PLAVIX) 75 MG tablet     empagliflozin (JARDIANCE) 10 MG TABS tablet     losartan (COZAAR) 25 MG tablet      13. Heart failure with reduced ejection fraction (H)  I50.20 empagliflozin (JARDIANCE) 10 MG TABS tablet     losartan (COZAAR) 25 MG tablet      14. Primary insomnia  F51.01 estazolam (PROSOM) 1 MG tablet      15. Paroxysmal supraventricular tachycardia (H)  I47.10 sotalol (BETAPACE) 80 MG tablet      16. PVC's (premature ventricular contractions)  I49.3 sotalol (BETAPACE) 80 MG tablet      17. BPH with lower urinary tract symptoms without urinary obstruction  N40.1 finasteride  (PROSCAR) 5 MG tablet     tamsulosin (FLOMAX) 0.4 MG capsule     UA with Microscopic reflex to Culture     Albumin Random Urine Quantitative with Creat Ratio     Adult Urology  Referral     UA with Microscopic reflex to Culture     Albumin Random Urine Quantitative with Creat Ratio      18. Hip pain, left  M25.552 Orthopedic  Referral     CANCELED: XR Pelvis and Hip Left 1 View      19. Encounter for screening for malignant neoplasm of prostate  Z12.5 Prostate Specific Antigen Screen     Prostate Specific Antigen Screen      20. Vitamin D deficiency  E55.9 Vitamin D Deficiency     Vitamin D Deficiency      21. Vitamin B12 deficiency  E53.8 Vitamin B12     Vitamin B12      22. Simple chronic bronchitis (H)  J41.0 tiotropium (SPIRIVA) 18 MCG inhaled capsule     albuterol (PROAIR HFA/PROVENTIL HFA/VENTOLIN HFA) 108 (90 Base) MCG/ACT inhaler      23. Low blood magnesium  R79.0 Magnesium     Magnesium      24. Trochanteric bursitis of left hip  M70.62 XR Joint Injection Major Left      25. Ischial bursitis of left side  M70.72 XR Joint Injection Major Left         Patient presents for Medicare annual wellness visit as well as follow-up multiple issues.    Heart failure with reduced ejection fraction/chronic systolic heart failure.  Seems to be doing better.  Currently on losartan, Jardiance.  Needs refills.  Denies exertional angina.  Patient has chronic ischemic heart disease.  Also taking rosuvastatin, Plavix.  Refill sent to pharmacy.    Generalized anxiety disorder, chronic.  Ongoing.  Has been having panic attacks, has noted improvement in symptoms with combination use of Xanax and Lexapro.  Has found current dosing helpful and effective.  Needs refills.    History of paroxysmal supraventricular tachycardia, PVCs.  Has found sotalol helpful and effective.  Needs refills.    BPH with lower urinary tract symptoms without obstruction.  Check lab work.  Urology referral placed.  Continues on Flomax and  finasteride.  Needs refills.    Left hip pain, x-rays noted bursitis.  Trochanteric and ischial bursitis.  Steroid injections ordered.    Prostate cancer lab screening today, check PSA levels.  This returned within normal limits.    Vitamin D and B12 deficiency.  Ongoing, has been taking oral replacement.  Lab work today shows adequate replacement.  Continue current dosing.    Low blood magnesium levels.  Currently taking replacement.  Lab work indicates adequate replacement.  Continue current dosing.    HYPERTENSION - Ongoing. Blood pressure is currently well controlled.  Medication side effects: None. Denies syncope or presyncope.  Continue current medications.   Medication list reviewed/updated. Refills completed as needed.      Familial HYPERLIPIDEMIA.  Ongoing. LDL is at goal: No. Triglycerides are at goal: Yes.  Hopefully lifestyle modifications will improve cholesterol levels, otherwise will consider additional medication dose adjustments or medication changes.  Medication side effects reported: None.   Continue current medications for now. Medication list reviewed/updated. Refills completed as needed.  Recent Labs   Lab Test 10/31/23  1033 03/30/23  1009   CHOL 178 276*   HDL 56 45    201*   TRIG 109 149        Chronic Simple Bronchitis / COPD - Patient has a longstanding history of COPD: Mild = FeV1 > 80%. Patient has been doing reasonably well overall noting MORRISON, COUGH, and Usually just AM white/clear phlegm and continues on Inhaler medication regimen without adverse reactions or side effects.      DEPRESSION - Patient has a long history of depression of moderate severity requiring medication for control.  Recent symptoms include: insomnia, anxiety.   Depression course: completely resolved.      OBESITY - Ongoing.  (See Encounter Diagnosis list above for obesity class / severity).  - Encourage continued maintenance / improvement in diet and exercise.   - Consider Nutrition / Dietician  appointment.  - Weight loss would improve Hypertension, Cholesterol.      Chronic Kidney Disease, Stage 2 (GFR 60-89), chronic, ongoing.  Kidney function had been slowly declining.  Encourage NSAID avoidance.    Recent Labs   Lab Test 10/29/24  1155 10/31/23  1033   CR 0.97 1.03   GFRESTIMATED 83 78        Vaccine counseling completed.  Encourage routine / annual vaccinations.     Results for orders placed or performed during the hospital encounter of 10/29/24   XR Pelvis w Hip Left G/E 2 Views     Status: None    Narrative    PROCEDURE: XR PELVIS AND HIP LEFT 2 VIEWS 10/29/2024 12:32 PM    HISTORY: Hip pain, left    COMPARISONS: None.    TECHNIQUE: Pelvis 1 view, left hip 2 views    FINDINGS: Pelvis: The pelvis is intact. The sacrum and sacroiliac  joints appear normal. Articular spaces normal height at the right hip.  Right proximal femur appears normal. Degenerative changes are present  in the lumbar spine.    Left hip 2 views: The acetabulum and left proximal femur appear  normal. There is some soft tissue calcification adjacent to the  greater trochanter and adjacent to the ischial tuberosity most likely  due to tendinitis or bursitis.         Impression    IMPRESSION: Tendinitis or bursitis adjacent to the greater trochanter  and the ischial tuberosity    GANESH BENITO MD         SYSTEM ID:  [id number]   Results for orders placed or performed in visit on 10/29/24   UA with Microscopic reflex to Culture     Status: Abnormal    Specimen: Urine, NOS   Result Value Ref Range    Color Urine Light Yellow Colorless, Straw, Light Yellow, Yellow    Appearance Urine Clear Clear    Glucose Urine >1000 (A) Negative mg/dL    Bilirubin Urine Negative Negative    Ketones Urine Negative Negative mg/dL    Specific Gravity Urine 1.032 (H) 1.000 - 1.030    Blood Urine Trace (A) Negative    pH Urine 6.0 5.0 - 9.0    Protein Albumin Urine Negative Negative mg/dL    Urobilinogen Urine Normal Normal, 2.0 mg/dL    Nitrite Urine  Negative Negative    Leukocyte Esterase Urine Negative Negative    Mucus Urine Present (A) None Seen /LPF    RBC Urine 4 (H) <=2 /HPF    WBC Urine <1 <=5 /HPF    Narrative    Urine Culture not indicated   Albumin Random Urine Quantitative with Creat Ratio     Status: None   Result Value Ref Range    Creatinine Urine mg/dL 91.4 mg/dL    Albumin Urine mg/L <12.0 mg/L    Albumin Urine mg/g Cr     Prostate Specific Antigen Screen     Status: Normal   Result Value Ref Range    Prostate Specific Antigen Screen 1.15 0.00 - 6.50 ng/mL    Narrative    This result is obtained using the Roche Elecsys total PSA method on the aline e601 immunoassay analyzer, which is an ultrasensitive method. Results obtained with different assay methods or kits cannot be used interchangeably.  This test is intended for initial prostate cancer screening. PSA values exceeding the age-specific limits are suspicious for prostate disease, but additional testing, such as prostate biopsy, is needed to diagnose prostate pathology. The American Cancer Society recommends annual examination with digital rectal examination and serum PSA beginning at age 50 and for men with a life expectancy of at least 10 years after detection of prostate cancer. For men in high-risk groups, such as  Americans or men with a first-degree relative diagnosed at a younger age, testing should begin at a younger age. It is generally recommended that information be provided to patients about the benefits and limitations of testing and treatment so they can make informed decisions.   Lipid Profile     Status: None   Result Value Ref Range    Cholesterol 176 <200 mg/dL    Triglycerides 141 <150 mg/dL    Direct Measure HDL 55 >=40 mg/dL    LDL Cholesterol Calculated 93 <100 mg/dL    Non HDL Cholesterol 121 <130 mg/dL    Patient Fasting > 8hrs? No     Narrative    Cholesterol  Desirable: < 200 mg/dL  Borderline High: 200 - 239 mg/dL  High: >= 240  mg/dL    Triglycerides  Normal: < 150 mg/dL  Borderline High: 150 - 199 mg/dL  High: 200-499 mg/dL  Very High: >= 500 mg/dL    Direct Measure HDL  Female: >= 50 mg/dL   Male: >= 40 mg/dL    LDL Cholesterol  Desirable: < 100 mg/dL  Above Desirable: 100 - 129 mg/dL   Borderline High: 130 - 159 mg/dL   High:  160 - 189 mg/dL   Very High: >= 190 mg/dL    Non HDL Cholesterol  Desirable: < 130 mg/dL  Above Desirable: 130 - 159 mg/dL  Borderline High: 160 - 189 mg/dL  High: 190 - 219 mg/dL  Very High: >= 220 mg/dL   Comprehensive metabolic panel     Status: Abnormal   Result Value Ref Range    Sodium 137 135 - 145 mmol/L    Potassium 4.6 3.4 - 5.3 mmol/L    Carbon Dioxide (CO2) 31 (H) 22 - 29 mmol/L    Anion Gap 7 7 - 15 mmol/L    Urea Nitrogen 20.7 8.0 - 23.0 mg/dL    Creatinine 0.97 0.67 - 1.17 mg/dL    GFR Estimate 83 >60 mL/min/1.73m2    Calcium 10.0 8.8 - 10.4 mg/dL    Chloride 99 98 - 107 mmol/L    Glucose 91 70 - 99 mg/dL    Alkaline Phosphatase 66 40 - 150 U/L    AST 20 0 - 45 U/L    ALT 12 0 - 70 U/L    Protein Total 6.7 6.4 - 8.3 g/dL    Albumin 4.3 3.5 - 5.2 g/dL    Bilirubin Total 0.5 <=1.2 mg/dL    Patient Fasting > 8hrs? No    Vitamin D Deficiency     Status: Normal   Result Value Ref Range    Vitamin D, Total (25-Hydroxy) 35 20 - 50 ng/mL    Narrative    Season, race, dietary intake, and treatment affect the concentration of 25-hydroxy-Vitamin D. Values may decrease during winter months and increase during summer months.    Vitamin D determination is routinely performed by an immunoassay specific for 25 hydroxyvitamin D3.  If an individual is on vitamin D2(ergocalciferol) supplementation, please specify 25 OH vitamin D2 and D3 level determination by LCMSMS test VITD23.     Vitamin B12     Status: Normal   Result Value Ref Range    Vitamin B12 950 232 - 1,245 pg/mL   Magnesium     Status: Abnormal   Result Value Ref Range    Magnesium 2.4 (H) 1.7 - 2.3 mg/dL   CBC with platelets and differential     Status:  None   Result Value Ref Range    WBC Count 5.9 4.0 - 11.0 10e3/uL    RBC Count 5.24 4.40 - 5.90 10e6/uL    Hemoglobin 16.8 13.3 - 17.7 g/dL    Hematocrit 50.5 40.0 - 53.0 %    MCV 96 78 - 100 fL    MCH 32.1 26.5 - 33.0 pg    MCHC 33.3 31.5 - 36.5 g/dL    RDW 12.2 10.0 - 15.0 %    Platelet Count 270 150 - 450 10e3/uL    % Neutrophils 55 %    % Lymphocytes 30 %    % Monocytes 10 %    % Eosinophils 3 %    % Basophils 2 %    % Immature Granulocytes 0 %    NRBCs per 100 WBC 0 <1 /100    Absolute Neutrophils 3.3 1.6 - 8.3 10e3/uL    Absolute Lymphocytes 1.8 0.8 - 5.3 10e3/uL    Absolute Monocytes 0.6 0.0 - 1.3 10e3/uL    Absolute Eosinophils 0.2 0.0 - 0.7 10e3/uL    Absolute Basophils 0.1 0.0 - 0.2 10e3/uL    Absolute Immature Granulocytes 0.0 <=0.4 10e3/uL    Absolute NRBCs 0.0 10e3/uL   CBC with Platelets & Differential     Status: None    Narrative    The following orders were created for panel order CBC with Platelets & Differential.  Procedure                               Abnormality         Status                     ---------                               -----------         ------                     CBC with platelets and d...[[id number]]                      Final result                 Please view results for these tests on the individual orders.      Pelvis and hip x-rays show degenerative changes in the lumbar spine.  Soft tissue calcification adjacent to the greater trochanter and adjacent to the ischial tuberosity most likely due to tendinitis or bursitis.    Urinalysis with greater than 1000 glucose, 4 RBC, 1 WBC.  PSA is normal.  Cholesterol levels show that the LDL is high and not at goal.  Triglycerides are at goal.  Commend accident slightly elevated.  Creatinine 0.97 with GFR of 83.  Liver enzymes normal.  Vitamin D is adequate.  Vitamin B12 is adequate.  Magnesium is slightly elevated.  CBC normal.           BMI  Estimated body mass index is 33.59 kg/m  as calculated from the following:    Height as  "of this encounter: 1.765 m (5' 9.5\").    Weight as of this encounter: 104.7 kg (230 lb 12.8 oz).         Return in about 1 year (around 10/29/2025) for Annual Medicare Wellness Visit.    Review of Systems   Constitutional:  Negative for chills and fever.   HENT:  Negative for congestion and hearing loss.    Eyes:  Negative for visual disturbance.   Respiratory:  Positive for cough (Usually AM clear/white phlegm) and shortness of breath. Negative for wheezing.    Cardiovascular:  Positive for peripheral edema (Improving). Negative for chest pain and palpitations.   Gastrointestinal:  Negative for abdominal pain, diarrhea (+ Improved with less Sertraline dose), nausea and vomiting.   Endocrine: Negative for cold intolerance and heat intolerance.   Genitourinary:  Positive for difficulty urinating (Nocturia about every 60 minutes at night), frequency, impotence and urgency. Negative for dysuria, hematuria and penile discharge.   Musculoskeletal:  Positive for arthralgias (left hip pain - worsening for past 4-5 months), gait problem, myalgias and neck pain.        + restless legs   Skin:  Negative for rash and wound.   Allergic/Immunologic: Negative for immunocompromised state.   Neurological:  Positive for numbness and paresthesias. Negative for dizziness and light-headedness.   Hematological:  Does not bruise/bleed easily.   Psychiatric/Behavioral:  Positive for sleep disturbance (Improving). Negative for agitation and confusion. The patient is nervous/anxious.        Preventive Care Visit  [facility]  Edenilson Adams MD, Internal Medicine  Oct 29, 2024      Leonela Anaya is a 74 year old, presenting for the following:  Medicare Annual Exam        10/29/2024    10:34 AM   Additional Questions   Roomed by Gerry Yeager LPN          History of Present Illness       Back Pain:  He presents for follow up of back pain. Patient's back pain is a chronic problem.  Location of back pain:  Left hip and " left side of waist  Description of back pain: sharp  Back pain spreads: left thigh    Since patient first noticed back pain, pain is: gradually worsening  Does back pain interfere with his job:  Not applicable       Mental Health Follow-up:  Patient presents to follow-up on Depression & Anxiety.Patient's depression since last visit has been:  Medium  The patient is not having other symptoms associated with depression.  Patient's anxiety since last visit has been:  Medium  The patient is not having other symptoms associated with anxiety.  Any significant life events: health concerns  Patient is feeling anxious or having panic attacks.  Patient has no concerns about alcohol or drug use.    Reason for visit:  Annual checkup    He eats 2-3 servings of fruits and vegetables daily.He consumes 0 sweetened beverage(s) daily.He exercises with enough effort to increase his heart rate 9 or less minutes per day.  He exercises with enough effort to increase his heart rate 3 or less days per week.   He is taking medications regularly.            Health Care Directive  Patient does not have a Health Care Directive: Discussed advance care planning with patient; information given to patient to review.      10/27/2023   General Health   How would you rate your overall physical health? Good            10/27/2023   Nutrition   At least 4 servings of fruits and vegetables/day No            10/27/2023   Exercise   Frequency of exercise: 2-3 days/week            10/27/2023   Social Factors   Worry food won't last until get money to buy more No   Food not last or not have enough money for food? No   Do you have housing? (Housing is defined as stable permanent housing and does not include staying ouside in a car, in a tent, in an abandoned building, in an overnight shelter, or couch-surfing.) Yes   Are you worried about losing your housing? No   Lack of transportation? No   Unable to get utilities (heat,electricity)? No             10/29/2024   Fall Risk   Fallen 2 or more times in the past year? No   Trouble with walking or balance? No             10/27/2023   Activities of Daily Living- Home Safety   Needs help with the following daily activites NO assistance is needed   Safety concerns in the home None of the above             No data to display                  10/27/2023   Hearing Screening   Hearing concerns? Find that men's voices are easier to understand than woman's    Difficulty understanding soft or whispered speech       Multiple values from one day are sorted in reverse-chronological order            No data to display                   Today's PHQ-9 Score:       10/29/2024    10:17 AM   PHQ-9 SCORE   PHQ-9 Total Score MyChart 10 (Moderate depression)   PHQ-9 Total Score 10        Patient-reported         10/27/2023   Substance Use   If I could quit smoking, I would Neutral   I want to quit somking, worry about health affects Neutral   Willing to make a plan to quit smoking Neutral   Willing to cut down before quitting Neutral   Alcohol more than 3/day or more than 7/wk Not Applicable        Social History     Tobacco Use    Smoking status: Former     Types: Pipe, Dip, chew, snus or snuff    Smokeless tobacco: Current     Types: Chew     Last attempt to quit: 1/1/1979    Tobacco comments:     Quit smoking: -- No longer using cigarettes, occasionally smokes a pipe   Vaping Use    Vaping status: Never Used   Substance Use Topics    Alcohol use: No     Alcohol/week: 0.0 standard drinks of alcohol    Drug use: No       ASCVD Risk   The ASCVD Risk score (Liana BEAULIEU, et al., 2019) failed to calculate for the following reasons:    Risk score cannot be calculated because patient has a medical history suggesting prior/existing ASCVD            Reviewed and updated as needed this visit by Provider   Tobacco  Allergies  Meds  Problems  Med Hx  Surg Hx  Fam Hx              Current providers sharing in care for this patient  "include:  Patient Care Team:  Edenilson Adams MD as PCP - General (Internal Medicine)  Edenilson Adams MD as Assigned PCP    The following health maintenance items are reviewed in Epic and correct as of today:  Health Maintenance   Topic Date Due    SPIROMETRY  Never done    COPD ACTION PLAN  Never done    ZOSTER IMMUNIZATION (1 of 2) Never done    RSV VACCINE (1 - Risk 60-74 years 1-dose series) Never done    HF ACTION PLAN  06/05/2021    DTAP/TDAP/TD IMMUNIZATION (2 - Td or Tdap) 12/11/2023    COVID-19 Vaccine (2 - 2024-25 season) 09/01/2024    COLORECTAL CANCER SCREENING  11/11/2024    DEPRESSION 12 MO INDEX REPEAT PHQ-9  11/12/2024    BMP  04/29/2025    PHQ-9  04/29/2025    MEDICARE ANNUAL WELLNESS VISIT  10/29/2025    ALT  10/29/2025    LIPID  10/29/2025    FALL RISK ASSESSMENT  10/29/2025    CBC  10/29/2025    GLUCOSE  10/29/2027    ADVANCE CARE PLANNING  10/29/2029    TSH W/FREE T4 REFLEX  Completed    INFLUENZA VACCINE  Completed    Pneumococcal Vaccine: 65+ Years  Completed    AORTIC ANEURYSM SCREENING (SYSTEM ASSIGNED)  Completed    HEPATITIS C SCREENING  Addressed    DEPRESSION ACTION PLAN  Addressed    HPV IMMUNIZATION  Aged Out    MENINGITIS IMMUNIZATION  Aged Out    RSV MONOCLONAL ANTIBODY  Aged Out    LUNG CANCER SCREENING  Discontinued            Objective    Exam  /64   Pulse 78   Resp 18   Ht 1.765 m (5' 9.5\")   Wt 104.7 kg (230 lb 12.8 oz)   SpO2 93%   BMI 33.59 kg/m     Estimated body mass index is 33.59 kg/m  as calculated from the following:    Height as of this encounter: 1.765 m (5' 9.5\").    Weight as of this encounter: 104.7 kg (230 lb 12.8 oz).    Physical Exam  Constitutional:       General: He is not in acute distress.     Appearance: He is well-developed. He is obese. He is not diaphoretic.   Eyes:      General: No scleral icterus.     Conjunctiva/sclera: Conjunctivae normal.   Neck:      Vascular: No carotid bruit.   Cardiovascular:      Rate and Rhythm: Normal rate and " regular rhythm.      Pulses: Normal pulses.   Pulmonary:      Effort: Pulmonary effort is normal.      Breath sounds: Wheezing present.   Abdominal:      General: Bowel sounds are normal.      Palpations: Abdomen is soft.      Tenderness: There is no abdominal tenderness.   Musculoskeletal:         General: Tenderness (Left hip, pain with standing, ROM) present. No deformity. Normal range of motion.      Cervical back: Neck supple.      Right lower leg: Pitting Edema (trace) present.      Left lower leg: Pitting Edema (trace) present.   Skin:     General: Skin is warm and dry.      Findings: No rash.   Neurological:      Mental Status: He is alert and oriented to person, place, and time. Mental status is at baseline.   Psychiatric:         Mood and Affect: Mood normal.         Behavior: Behavior normal.               10/29/2024   Mini Cog   Clock Draw Score 2 Normal   3 Item Recall 3 objects recalled   Mini Cog Total Score 5                 Signed Electronically by: Edenilson Adams MD

## 2024-10-29 NOTE — PATIENT INSTRUCTIONS
Blood pressure is controlled.     Medications refilled.   Labs are pending.       Switch sertraline over to Lexapro, skip sertraline tonight and start Lexapro 20 mg once daily tomorrow morning.    Hip x-rays today.    Orthopedic referral placed.    Check PSA and urinalysis today.  Urology consultation requested  - they will call with date/time of appointment.       Start Spiriva inhaler from [facility] to help with the chronic phlegm.  Use once daily.    The groin rash, continue alternating with antibiotic cream and antifungal cream.      Return in approximately 1 year, or sooner as needed for follow-up with Dr. Adams.  - Annual Follow-up / Physical - Medicare Annual Wellness Visit     Clinic : [phone]  Appointment line: [phone]

## 2024-10-30 LAB — VIT D+METAB SERPL-MCNC: 35 NG/ML (ref 20–50)

## 2024-11-06 ENCOUNTER — MYC MEDICAL ADVICE (OUTPATIENT)
Dept: INTERNAL MEDICINE | Facility: OTHER | Age: 74
End: 2024-11-06
Payer: COMMERCIAL

## 2024-11-06 DIAGNOSIS — I25.83 CORONARY ARTERY DISEASE DUE TO LIPID RICH PLAQUE: ICD-10-CM

## 2024-11-06 DIAGNOSIS — I25.10 CORONARY ARTERY DISEASE DUE TO LIPID RICH PLAQUE: ICD-10-CM

## 2024-11-06 RX ORDER — NITROGLYCERIN 0.4 MG/1
TABLET SUBLINGUAL
Qty: 25 TABLET | Refills: 3 | Status: SHIPPED | OUTPATIENT
Start: 2024-11-06

## 2024-11-06 NOTE — TELEPHONE ENCOUNTER
Pt did not get refill of nitroglycerin at [facility] on 10/29. Waiting until after deer season to schedule cortisone shot.     Ervin'd up order.     Routing to provider to review and respond.  Tesha Berry RN on 11/6/2024 at 10:43 AM

## 2024-11-26 DIAGNOSIS — Z12.11 COLON CANCER SCREENING: ICD-10-CM

## 2024-12-10 ENCOUNTER — ORDERS ONLY (AUTO-RELEASED) (OUTPATIENT)
Dept: ADMISSION | Facility: CLINIC | Age: 74
End: 2024-12-10
Payer: COMMERCIAL

## 2024-12-10 DIAGNOSIS — Z12.11 COLON CANCER SCREENING: ICD-10-CM

## 2025-01-09 LAB — NONINV COLON CA DNA+OCC BLD SCRN STL QL: NEGATIVE

## 2025-02-12 ENCOUNTER — OFFICE VISIT (OUTPATIENT)
Dept: ORTHOPEDICS | Facility: OTHER | Age: 75
End: 2025-02-12
Attending: ORTHOPAEDIC SURGERY
Payer: MEDICARE

## 2025-02-12 VITALS
BODY MASS INDEX: 33.64 KG/M2 | SYSTOLIC BLOOD PRESSURE: 138 MMHG | WEIGHT: 235 LBS | OXYGEN SATURATION: 97 % | DIASTOLIC BLOOD PRESSURE: 74 MMHG | HEIGHT: 70 IN | RESPIRATION RATE: 16 BRPM | HEART RATE: 76 BPM

## 2025-02-12 DIAGNOSIS — M48.062 SPINAL STENOSIS OF LUMBAR REGION WITH NEUROGENIC CLAUDICATION: Primary | ICD-10-CM

## 2025-02-12 DIAGNOSIS — M70.62 TROCHANTERIC BURSITIS OF LEFT HIP: ICD-10-CM

## 2025-02-12 DIAGNOSIS — N40.0 BPH (BENIGN PROSTATIC HYPERPLASIA): Primary | ICD-10-CM

## 2025-02-12 PROCEDURE — G0463 HOSPITAL OUTPT CLINIC VISIT: HCPCS

## 2025-02-12 ASSESSMENT — PAIN SCALES - GENERAL: PAINLEVEL_OUTOF10: MILD PAIN (3)

## 2025-02-12 NOTE — PROGRESS NOTES
Surgical Clinic Consult  Primary physician:     Edenilson Adams    Chief complaint:   Left hip buttock pain    History of present illness:  This is a 74 year old male I am seeing in consultation for left hip and buttock pain.  Patient reports his pain along the side has been settling down as he is modified his activity has been doing some different stretch exercises as well.  Patient reports pain deep within his buttocks.  Seems to be worse with walking and standing.  Did have back hip x-rays done which did not reveal any significant arthritis of his hip.  There is calcification along the insertional point of his hip abductors.  He does also have significant arthrosis in the lower back.  Patient is here to explore neck steps and options but he ultimately does feel like he is improving on his scenario and does not need any imminent interventions.    Past medical history:   Past Medical History:   Diagnosis Date    Anxiety disorder     No Comments Provided    Atherosclerotic heart disease of native coronary artery without angina pectoris     Inferior Infarct June 2008 BMS placed to distal RCA Non transmural MI march 2010.late stenosis to RCA OTTO placed at [facility]    Chronic ischemic heart disease     No Comments Provided    Gastro-esophageal reflux disease without esophagitis     No Comments Provided    Generalized anxiety disorder     No Comments Provided    Hyperlipidemia     No Comments Provided    Major depressive disorder, single episode     RX with Paxil Spring 2011    Male erectile dysfunction     No Comments Provided    Palpitations     No Comments Provided    Recurrent major depression in complete remission     With somatization       Pastsurgical history:  Past Surgical History:   Procedure Laterality Date    ARTHROSCOPY KNEE      lateral release, left knee    BACK SURGERY      age 19,fractured coccyx    COLONOSCOPY  01/01/2011 2011,Diverticulosis. No polyps.  Next due 2021.    ESOPHAGOSCOPY,  GASTROSCOPY, DUODENOSCOPY (EGD), COMBINED      1/2/11,no HH, no GERD (one interpretation of this might be that the PPI has been successful in preventing injury.)    FINGER SURGERY      right hand repair    HEART CATH, ANGIOPLASTY      6/2008,   3/2009,right coronary stents    TONSILLECTOMY      age 7       Current medications:  Current Outpatient Medications   Medication Sig Dispense Refill    acetaminophen (TYLENOL) 650 MG CR tablet Take 1 tablet (650 mg) by mouth every 8 hours as needed for pain or fever 300 tablet 4    albuterol (PROAIR HFA/PROVENTIL HFA/VENTOLIN HFA) 108 (90 Base) MCG/ACT inhaler Inhale 1-2 puffs into the lungs every 4 hours as needed for shortness of breath, wheezing or cough. ** [facility] ** 18 g 4    ALPRAZolam (XANAX) 0.25 MG tablet Take 0.5-1 tablets (0.125-0.25 mg) by mouth daily as needed for anxiety. 40 tablet 2    aspirin EC 81 MG EC tablet Take 81 mg by mouth daily with food      clopidogrel (PLAVIX) 75 MG tablet TAKE 1 TABLET BY MOUTH DAILY 90 tablet 4    empagliflozin (JARDIANCE) 10 MG TABS tablet Take 1 tablet (10 mg) by mouth daily - For heart 90 tablet 4    escitalopram (LEXAPRO) 20 MG tablet Take 1 tablet (20 mg) by mouth daily. -- Start instead of Sertraline -- 90 tablet 4    estazolam (PROSOM) 1 MG tablet Take 0.5-1 tablets (0.5-1 mg) by mouth at bedtime. 90 tablet 1    finasteride (PROSCAR) 5 MG tablet Take 1 tablet (5 mg) by mouth daily. - for BPH 90 tablet 4    fish oil-omega-3 fatty acids 1000 MG capsule Take 500 mg by mouth      losartan (COZAAR) 25 MG tablet Take 1 tablet (25 mg) by mouth daily. 90 tablet 4    methylPREDNISolone (MEDROL) 32 MG tablet 32 mg po 12 hours prior to test, 32 mg po 2 hours prior to test 2 tablet 0    multivitamin w/minerals (THERA-VIT-M) tablet Take 1 tablet by mouth daily      nitroGLYcerin (NITROSTAT) 0.4 MG sublingual tablet DISSOLVE ONE TABLET UNDER THE TONGUE EVERY 5 MINUTES AS NEEDED 25 tablet 3    rosuvastatin  (CRESTOR) 20 MG tablet Take 1 tablet (20 mg) by mouth daily. 90 tablet 4    SACCHAROMYCES BOULARDII PO Take by mouth daily      sotalol (BETAPACE) 80 MG tablet Take 1 tablet (80 mg) by mouth 2 times daily. 180 tablet 4    tamsulosin (FLOMAX) 0.4 MG capsule Take 1 capsule (0.4 mg) by mouth 2 times daily. 180 capsule 4    tiotropium (SPIRIVA) 18 MCG inhaled capsule Inhale 1 capsule (18 mcg) into the lungs daily. 90 capsule 4       Allergies:  Allergies   Allergen Reactions    Iodine Shortness Of Breath     Runny Nose  Watery eyes    Omeprazole Other (See Comments)     Hives, then respiratory distress        Ticagrelor Nausea and Shortness Of Breath     Abdominal pain, bloating - diarrhea, possible cause of stomach bleeding    Lisinopril Cough    Metoprolol      Other reaction(s): Bradycardia       Family history:  Family History   Problem Relation Age of Onset    Heart Disease Father         Heart Disease,CAD    Cancer Father         Cancer,Bladder    Diabetes Father         Diabetes    Family History Negative Brother         Good Health,x5    Family History Negative Sister         Good Health,x2       Social history:  Social History     Socioeconomic History    Marital status:      Spouse name: Arielle    Number of children: Not on file    Years of education: Not on file    Highest education level: Not on file   Occupational History    Not on file   Tobacco Use    Smoking status: Former     Types: Pipe, Dip, chew, snus or snuff    Smokeless tobacco: Current     Types: Chew     Last attempt to quit: 1/1/1979    Tobacco comments:     Quit smoking: -- No longer using cigarettes, occasionally smokes a pipe   Vaping Use    Vaping status: Never Used   Substance and Sexual Activity    Alcohol use: No     Alcohol/week: 0.0 standard drinks of alcohol    Drug use: No    Sexual activity: Not Currently     Partners: Female   Other Topics Concern    Parent/sibling w/ CABG, MI or angioplasty before 65F 55M? 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     Social Drivers of Health     Financial Resource Strain: Low Risk  (10/27/2023)    Financial Resource Strain     Within the past 12 months, have you or your family members you live with been unable to get utilities (heat, electricity) when it was really needed?: No   Food Insecurity: Low Risk  (10/27/2023)    Food Insecurity     Within the past 12 months, did you worry that your food would run out before you got money to buy more?: No     Within the past 12 months, did the food you bought just not last and you didn t have money to get more?: No   Transportation Needs: Low Risk  (10/27/2023)    Transportation Needs     Within the past 12 months, has lack of transportation kept you from medical appointments, getting your medicines, non-medical meetings or appointments, work, or from getting things that you need?: No   Physical Activity: Not on file   Stress: Not on file   Social Connections: Unknown (12/28/2021)    Received [facility], [facility]    Social Connections     Frequency of Communication with Friends and Family: Not on file   Interpersonal Safety: Low Risk  (10/29/2024)    Interpersonal Safety     Do you feel physically and emotionally safe where you currently live?: Yes     Within the past 12 months, have you been hit, slapped, kicked or otherwise physically hurt by someone?: No     Within the past 12 months, have you been humiliated or emotionally abused in other ways by your partner or ex-partner?: No   Housing Stability: Low Risk  (10/27/2023)    Housing Stability     Do you have housing? : Yes     Are you worried about losing your housing?: No       PROBLEM LIST:  Patient Active Problem List   Diagnosis    Bilateral leg edema    Coronary artery disease due to lipid rich plaque    ED (erectile dysfunction)    Heartburn     "History of ST elevation myocardial infarction (STEMI)    History of tobacco abuse    Hx of right coronary artery stent placement    Benign essential hypertension    Ischemic heart disease, chronic    Familial hyperlipidemia    Old inferior wall myocardial infarction    Palpitations    PVC's (premature ventricular contractions)    Seasonal allergies    Food sticks on swallowing    Generalized anxiety disorder    Ulnar neuropathy at elbow, unspecified laterality - Bilaterally    Numbness and tingling of both feet    Paroxysmal supraventricular tachycardia    BPH with lower urinary tract symptoms without urinary obstruction    Chronic systolic heart failure (H)    Vitamin B12 deficiency    Recurrent major depression in complete remission    Panic attacks    Chronic prescription benzodiazepine use    Heart failure with reduced ejection fraction (H)    Chronic neck pain    Primary insomnia    Class 1 obesity due to excess calories with serious comorbidity and body mass index (BMI) of 32.0 to 32.9 in adult    Simple chronic bronchitis (H)       Review of Systems:  COMPLETE 12 point REVIEW OF SYSTEMS is otherwise negative with the exception of which is stated above.    Physical exam: /74   Pulse 76   Resp 16   Ht 1.765 m (5' 9.5\")   Wt 106.6 kg (235 lb)   SpO2 97%   BMI 34.21 kg/m      General: this is a pleasant male patient in no acute distress.  Patient is awake alert and oriented x3 .   EXAM:  Chest/Respiratory Exam: Normal - Clear to auscultation without rales, rhonchi, or wheezing.  Cardiovascular Exam: normal  Musculoskeletal: Left hip examination shows hip flexion to 90.  Internal and external rotation to 20.  Hip abduction 30.  Minimal discomfort hip flex and internal rotation.  Only mildly symptomatic with palpation across the bursa.  JACKI testing also negative.  Discomfort is seen with straight leg raise left lower extremity as well.    Imagin views left hip show minimal arthritic changes.  " Calcification about the hip abductors.  Significant spinal stenosis.    Assessment:   #1 left hip bursitis but resolving.  #2 lumbar stenosis with left leg pain S1 nerve irritation into the buttocks    Plan:    Talked about stretches.  We could certainly do a left hip bursal injection the future if this does return.  #2 in regards to his lower back if those symptoms do intensify he should consider MRI evaluation of the lumbar to evaluate that more in detail and determine if he would benefit from injections versus other interventions.  He is going to let his primary care doctor know if this needs to be worked up further.      Brody Yancey MD

## 2025-03-06 ENCOUNTER — LAB (OUTPATIENT)
Dept: LAB | Facility: OTHER | Age: 75
End: 2025-03-06
Attending: UROLOGY
Payer: COMMERCIAL

## 2025-03-06 DIAGNOSIS — N40.0 BPH (BENIGN PROSTATIC HYPERPLASIA): ICD-10-CM

## 2025-03-06 LAB
ALBUMIN UR-MCNC: NEGATIVE MG/DL
APPEARANCE UR: CLEAR
BILIRUB UR QL STRIP: NEGATIVE
COLOR UR AUTO: YELLOW
GLUCOSE UR STRIP-MCNC: >1000 MG/DL
HGB UR QL STRIP: ABNORMAL
KETONES UR STRIP-MCNC: NEGATIVE MG/DL
LEUKOCYTE ESTERASE UR QL STRIP: NEGATIVE
NITRATE UR QL: NEGATIVE
PH UR STRIP: 5.5 [PH] (ref 5–9)
SP GR UR STRIP: 1.03 (ref 1–1.03)
UROBILINOGEN UR STRIP-MCNC: NORMAL MG/DL

## 2025-03-06 PROCEDURE — 81003 URINALYSIS AUTO W/O SCOPE: CPT | Mod: ZL

## 2025-03-08 ENCOUNTER — MYC MEDICAL ADVICE (OUTPATIENT)
Dept: UROLOGY | Facility: OTHER | Age: 75
End: 2025-03-08
Payer: COMMERCIAL

## 2025-03-11 NOTE — TELEPHONE ENCOUNTER
Called patient and is aware no antibiotic needed.  Nieves Felix LPN ....................3/11/2025  10:13 AM

## 2025-03-26 ENCOUNTER — LAB (OUTPATIENT)
Dept: LAB | Facility: OTHER | Age: 75
End: 2025-03-26
Attending: UROLOGY
Payer: MEDICARE

## 2025-03-26 ENCOUNTER — HOSPITAL ENCOUNTER (OUTPATIENT)
Dept: CT IMAGING | Facility: OTHER | Age: 75
Discharge: HOME | End: 2025-03-26
Attending: UROLOGY
Payer: MEDICARE

## 2025-03-26 DIAGNOSIS — R31.21 ASYMPTOMATIC MICROSCOPIC HEMATURIA: ICD-10-CM

## 2025-03-26 LAB
ANION GAP SERPL CALCULATED.3IONS-SCNC: 11 MMOL/L (ref 7–15)
BUN SERPL-MCNC: 26.2 MG/DL (ref 8–23)
CALCIUM SERPL-MCNC: 9.9 MG/DL (ref 8.8–10.4)
CHLORIDE SERPL-SCNC: 100 MMOL/L (ref 98–107)
CREAT SERPL-MCNC: 1 MG/DL (ref 0.67–1.17)
EGFRCR SERPLBLD CKD-EPI 2021: 80 ML/MIN/1.73M2
GLUCOSE SERPL-MCNC: 122 MG/DL (ref 70–99)
HCO3 SERPL-SCNC: 27 MMOL/L (ref 22–29)
POTASSIUM SERPL-SCNC: 5.3 MMOL/L (ref 3.4–5.3)
SODIUM SERPL-SCNC: 138 MMOL/L (ref 135–145)

## 2025-03-26 PROCEDURE — 250N000011 HC RX IP 250 OP 636: Performed by: UROLOGY

## 2025-03-26 PROCEDURE — 36415 COLL VENOUS BLD VENIPUNCTURE: CPT | Mod: ZL

## 2025-03-26 PROCEDURE — 80048 BASIC METABOLIC PNL TOTAL CA: CPT | Mod: ZL

## 2025-03-26 PROCEDURE — 250N000009 HC RX 250: Performed by: UROLOGY

## 2025-03-26 PROCEDURE — 82310 ASSAY OF CALCIUM: CPT | Mod: ZL

## 2025-03-26 PROCEDURE — 74178 CT ABD&PLV WO CNTR FLWD CNTR: CPT

## 2025-03-26 PROCEDURE — 82565 ASSAY OF CREATININE: CPT | Mod: ZL

## 2025-03-26 RX ORDER — IOPAMIDOL 755 MG/ML
136 INJECTION, SOLUTION INTRAVASCULAR ONCE
Status: COMPLETED | OUTPATIENT
Start: 2025-03-26 | End: 2025-03-26

## 2025-03-26 RX ADMIN — IOPAMIDOL 136 ML: 755 INJECTION, SOLUTION INTRAVENOUS at 15:44

## 2025-03-26 RX ADMIN — SODIUM CHLORIDE 60 ML: 9 INJECTION, SOLUTION INTRAVENOUS at 15:45

## 2025-03-27 NOTE — RESULT ENCOUNTER NOTE
Héctor, these changes in your bladder are consistent with enlargement of your prostate.  We will confirm that on cystoscopy when we look in your bladder.    You have a small cyst in your left kidney which is benign and not concerning.  No kidney stones or masses or concerns in either kidney.    Dr. Bunch

## 2025-04-15 RX ORDER — ESTAZOLAM 1 MG
.5-1 TABLET ORAL AT BEDTIME
Qty: 90 TABLET | Refills: 1 | Status: CANCELLED | OUTPATIENT
Start: 2025-04-15

## 2025-04-16 ENCOUNTER — OFFICE VISIT (OUTPATIENT)
Dept: INTERNAL MEDICINE | Facility: OTHER | Age: 75
End: 2025-04-16
Attending: INTERNAL MEDICINE
Payer: MEDICARE

## 2025-04-16 VITALS
OXYGEN SATURATION: 97 % | HEIGHT: 70 IN | BODY MASS INDEX: 33.21 KG/M2 | RESPIRATION RATE: 16 BRPM | DIASTOLIC BLOOD PRESSURE: 70 MMHG | HEART RATE: 59 BPM | SYSTOLIC BLOOD PRESSURE: 106 MMHG | TEMPERATURE: 97.5 F | WEIGHT: 232 LBS

## 2025-04-16 DIAGNOSIS — E66.811 CLASS 1 OBESITY DUE TO EXCESS CALORIES WITH SERIOUS COMORBIDITY AND BODY MASS INDEX (BMI) OF 33.0 TO 33.9 IN ADULT: ICD-10-CM

## 2025-04-16 DIAGNOSIS — I50.22 CHRONIC SYSTOLIC HEART FAILURE (H): ICD-10-CM

## 2025-04-16 DIAGNOSIS — F41.1 GENERALIZED ANXIETY DISORDER: Primary | ICD-10-CM

## 2025-04-16 DIAGNOSIS — F51.01 PRIMARY INSOMNIA: ICD-10-CM

## 2025-04-16 DIAGNOSIS — F41.0 PANIC ATTACKS: ICD-10-CM

## 2025-04-16 DIAGNOSIS — E66.09 CLASS 1 OBESITY DUE TO EXCESS CALORIES WITH SERIOUS COMORBIDITY AND BODY MASS INDEX (BMI) OF 33.0 TO 33.9 IN ADULT: ICD-10-CM

## 2025-04-16 DIAGNOSIS — Z79.899 CHRONIC PRESCRIPTION BENZODIAZEPINE USE: ICD-10-CM

## 2025-04-16 DIAGNOSIS — I10 BENIGN ESSENTIAL HYPERTENSION: ICD-10-CM

## 2025-04-16 DIAGNOSIS — J41.0 SIMPLE CHRONIC BRONCHITIS (H): ICD-10-CM

## 2025-04-16 PROCEDURE — G0463 HOSPITAL OUTPT CLINIC VISIT: HCPCS

## 2025-04-16 RX ORDER — ALPRAZOLAM 0.25 MG
.125-.25 TABLET ORAL DAILY PRN
Qty: 20 TABLET | Refills: 5 | Status: SHIPPED | OUTPATIENT
Start: 2025-04-16

## 2025-04-16 RX ORDER — ALBUTEROL SULFATE 90 UG/1
1-2 INHALANT RESPIRATORY (INHALATION) EVERY 4 HOURS PRN
Qty: 18 G | Refills: 11 | Status: SHIPPED | OUTPATIENT
Start: 2025-04-16

## 2025-04-16 RX ORDER — ESCITALOPRAM OXALATE 20 MG/1
20 TABLET ORAL DAILY
Qty: 90 TABLET | Refills: 4 | Status: SHIPPED | OUTPATIENT
Start: 2025-04-16

## 2025-04-16 ASSESSMENT — ENCOUNTER SYMPTOMS
DIFFICULTY URINATING: 1
LIGHT-HEADEDNESS: 0
ARTHRALGIAS: 1
COUGH: 1
CHILLS: 0
NUMBNESS: 1
DIZZINESS: 0
PALPITATIONS: 0
FEVER: 0
NERVOUS/ANXIOUS: 0
WHEEZING: 0
FREQUENCY: 1
MYALGIAS: 1
PARESTHESIAS: 1
NECK PAIN: 1
VOMITING: 0
CONFUSION: 0
SLEEP DISTURBANCE: 0
ABDOMINAL PAIN: 0
SHORTNESS OF BREATH: 1
DYSURIA: 0
HEMATURIA: 0
AGITATION: 0
WOUND: 0
BRUISES/BLEEDS EASILY: 0
NAUSEA: 0
DIARRHEA: 0

## 2025-04-16 ASSESSMENT — PATIENT HEALTH QUESTIONNAIRE - PHQ9
SUM OF ALL RESPONSES TO PHQ QUESTIONS 1-9: 8
SUM OF ALL RESPONSES TO PHQ QUESTIONS 1-9: 8
10. IF YOU CHECKED OFF ANY PROBLEMS, HOW DIFFICULT HAVE THESE PROBLEMS MADE IT FOR YOU TO DO YOUR WORK, TAKE CARE OF THINGS AT HOME, OR GET ALONG WITH OTHER PEOPLE: SOMEWHAT DIFFICULT

## 2025-04-16 ASSESSMENT — PAIN SCALES - GENERAL: PAINLEVEL_OUTOF10: NO PAIN (0)

## 2025-04-16 NOTE — PATIENT INSTRUCTIONS
Blood pressure is well controlled.     Medications refilled.       Return in October 2025, or sooner as needed for follow-up with Dr. Adams.  - Annual Follow-up / Physical - Medicare Annual Wellness Visit     Clinic : [phone]  Appointment line: [phone]

## 2025-04-16 NOTE — NURSING NOTE
"Patient presents to clinic for follow up on medications.    Chief Complaint   Patient presents with    Follow Up     Controlled meds       FOOD SECURITY SCREENING QUESTIONS  Hunger Vital Signs:  Within the past 12 months we worried whether our food would run out before we got money to buy more. Never  Within the past 12 months the food we bought just didn't last and we didn't have money to get more. Never  Nancy Her 4/16/2025 9:56 AM      Initial /70 (BP Location: Right arm, Patient Position: Sitting, Cuff Size: Adult Large)   Pulse 59   Temp 97.5  F (36.4  C) (Temporal)   Resp 16   Ht 1.765 m (5' 9.5\")   Wt 105.2 kg (232 lb)   SpO2 97%   BMI 33.77 kg/m   Estimated body mass index is 33.77 kg/m  as calculated from the following:    Height as of this encounter: 1.765 m (5' 9.5\").    Weight as of this encounter: 105.2 kg (232 lb).  Medication Reconciliation: complete    Nancy Her  "

## 2025-04-16 NOTE — PROGRESS NOTES
Assessment & Plan     ICD-10-CM    1. Generalized anxiety disorder  F41.1 ALPRAZolam (XANAX) 0.25 MG tablet     escitalopram (LEXAPRO) 20 MG tablet      2. Panic attacks  F41.0 ALPRAZolam (XANAX) 0.25 MG tablet     escitalopram (LEXAPRO) 20 MG tablet      3. Chronic prescription benzodiazepine use  Z79.899 ALPRAZolam (XANAX) 0.25 MG tablet      4. Primary insomnia  F51.01       5. Simple chronic bronchitis (H)  J41.0 albuterol (PROAIR HFA/PROVENTIL HFA/VENTOLIN HFA) 108 (90 Base) MCG/ACT inhaler      6. Class 1 obesity due to excess calories with serious comorbidity and body mass index (BMI) of 33.0 to 33.9 in adult  E66.811     E66.09     Z68.33       7. Benign essential hypertension  I10       8. Chronic systolic heart failure (H)  I50.22          Patient presents for follow-up multiple issues.    States that his generalized anxiety and panic attacks are doing much better.  Has lost a number of close friends and family in the last 6 to 12 months.  Now rarely using alprazolam but does still take intermittently.  Has found switching from sertraline over to Lexapro very helpful for his mood.  Much less anxious.  Overall doing well.  Needs refills.    Insomnia is doing better.  Now rarely using estazolam.  Declines need for refills at this time.    Chronic systolic heart failure, seems to be doing well.  Tolerating current medications.  Doing well with Jardiance.  No changes for now.  Continue current medications.    Chronic Bronchitis / COPD - Patient has a longstanding history of COPD: Moderate = FeV1 < 79% -50%. Patient has been doing reasonably well overall noting MORRISON and WHEEZING and continues on Inhalers medication regimen without adverse reactions or side effects.      OBESITY - Ongoing.  (See Encounter Diagnosis list above for obesity class / severity).  - Encourage continued maintenance / improvement in diet and exercise.   - Consider Nutrition / Dietician appointment.  - Weight loss would improve  "Hypertension, Cholesterol.      Vaccine counseling completed.  Encourage routine / annual vaccinations.    HYPERTENSION - Ongoing. Blood pressure is currently well controlled.  Medication side effects: None. Denies syncope or presyncope.  Continue current medications.   Medication list reviewed/updated. Refills completed as needed.       The longitudinal plan of care for the diagnosis(es)/condition(s) as documented were addressed during this visit. Due to the added complexity in care, I will continue to support Héctor in the subsequent management and with ongoing continuity of care.             BMI  Estimated body mass index is 33.77 kg/m  as calculated from the following:    Height as of this encounter: 1.765 m (5' 9.5\").    Weight as of this encounter: 105.2 kg (232 lb).         Return in about 6 months (around 10/28/2025) for Annual Medicare Wellness Visit.      Edenilson Adams MD  [facility]    Review of Systems   Constitutional:  Negative for chills and fever.   HENT:  Negative for congestion and hearing loss.    Eyes:  Negative for visual disturbance.   Respiratory:  Positive for cough (Usually AM clear/white phlegm) and shortness of breath. Negative for wheezing.    Cardiovascular:  Positive for peripheral edema (Improving). Negative for chest pain and palpitations.   Gastrointestinal:  Negative for abdominal pain, diarrhea, nausea and vomiting.   Endocrine: Negative for cold intolerance and heat intolerance.   Genitourinary:  Positive for difficulty urinating (Previously with nocturia about every 60 minutes at night  - doing better recently), frequency, impotence and urgency. Negative for dysuria, hematuria and penile discharge.   Musculoskeletal:  Positive for arthralgias (left hip pain - worsening for past 4-5 months), gait problem, myalgias and neck pain.        + restless legs   Skin:  Negative for rash and wound.   Allergic/Immunologic: Negative for immunocompromised state. " "  Neurological:  Positive for numbness and paresthesias. Negative for dizziness and light-headedness.   Hematological:  Does not bruise/bleed easily.   Psychiatric/Behavioral:  Negative for agitation, confusion and sleep disturbance (Improving, rarely using estazolam now). The patient is not nervous/anxious (+ Doing much better with Lexapro for mood).        Leonela Anaya is a 74 year old, presenting for the following health issues:  Follow Up (Controlled meds)        4/16/2025     9:49 AM   Additional Questions   Roomed by Nancy PENALOZA lpn     History of Present Illness       Reason for visit:  Required re=check   He is taking medications regularly.                      Objective    /70 (BP Location: Right arm, Patient Position: Sitting, Cuff Size: Adult Large)   Pulse 59   Temp 97.5  F (36.4  C) (Temporal)   Resp 16   Ht 1.765 m (5' 9.5\")   Wt 105.2 kg (232 lb)   SpO2 97%   BMI 33.77 kg/m    Body mass index is 33.77 kg/m .  Physical Exam  Constitutional:       General: He is not in acute distress.     Appearance: Normal appearance. He is well-developed. He is not ill-appearing.   Eyes:      General: No scleral icterus.     Conjunctiva/sclera: Conjunctivae normal.   Neck:      Vascular: No carotid bruit.   Cardiovascular:      Rate and Rhythm: Normal rate and regular rhythm.      Pulses: Normal pulses.   Pulmonary:      Effort: Pulmonary effort is normal. No respiratory distress.      Breath sounds: No wheezing.   Abdominal:      Palpations: Abdomen is soft.      Tenderness: There is no abdominal tenderness.   Musculoskeletal:         General: No tenderness or deformity.      Right lower leg: No edema.      Left lower leg: No edema.   Skin:     General: Skin is warm and dry.      Findings: No rash.   Neurological:      Mental Status: He is alert. Mental status is at baseline.      Cranial Nerves: No cranial nerve deficit.   Psychiatric:         Mood and Affect: Mood normal.         Behavior: Behavior " normal.                    Signed Electronically by: Edenilson Adams MD

## 2025-05-05 ENCOUNTER — NURSE TRIAGE (OUTPATIENT)
Dept: INTERNAL MEDICINE | Facility: OTHER | Age: 75
End: 2025-05-05
Payer: COMMERCIAL

## 2025-05-05 NOTE — TELEPHONE ENCOUNTER
"Unable to get patient into clinic at this time. Patient lives in [place] and it takes about an hour for him to get here. Patient agrees to present to [facility] this afternoon. Indira Leblanc RN on 5/5/2025 at 2:19 PM       Reason for Disposition   Systolic BP  while taking blood pressure medications    Additional Information   Negative: Systolic BP < 90 and feeling weak or lightheaded (e.g., woozy, feeling like they might faint)   Negative: Started suddenly after an allergic medicine, an allergic food, or bee sting   Negative: Shock suspected (e.g., cold/pale/clammy skin, too weak to stand, low BP, rapid pulse)   Negative: Difficult to awaken or acting confused (e.g., disoriented, slurred speech)   Negative: Fainted   Negative: Chest pain   Negative: Bleeding (e.g., vomiting blood, rectal bleeding or tarry stools, severe vaginal bleeding)   Negative: Extra heartbeats, irregular heart beating, or heart is beating very fast (i.e., 'palpitations')   Negative: Sounds like a life-threatening emergency to the triager   Negative: Systolic BP < 80 and NOT feeling weak or lightheaded   Negative: Abdominal pain   Negative: Major surgery in the past month   Negative: Fever > 100.4 F (38.0 C)   Negative: Drinking very little and dehydration suspected (e.g., no urine > 12 hours, very dry mouth, very lightheaded)   Negative: Fall in systolic BP > 20 mm Hg from normal and feeling weak or lightheaded   Negative: Patient sounds very sick or weak to the triager   Negative: Systolic BP < 90 and NOT feeling weak or lightheaded    Answer Assessment - Initial Assessment Questions  1. BLOOD PRESSURE: \"What is your blood pressure?\" \"Did you take at least two measurements 5 minutes apart?\"      90's/50's     2. ONSET: \"When did you take your blood pressure?\"      Takes it off and on during the day especially when he's feeling very tired     3. HOW: \"How did you take your blood pressure?\" (e.g., visiting nurse, automatic home BP " "monitor)      Home cuff     4. HISTORY: \"Do you have a history of low blood pressure?\" \"What is your blood pressure normally?\"      Denies     5. MEDICINES: \"Are you taking any medicines for blood pressure?\" If Yes, ask: \"Have they been changed recently?\"      Losartan which has never bothered him before, tamsulosin - has been taking this for quite a while but incorrectly - \"Take one tablet by mouth\" - one month ago realized it was twice daily and since added the second one which improved urination but that's also when he started noticing low blood pressure. He started taking half of his losartan which made no difference, so he resumed the full dose     6. PULSE RATE: \"Do you know what your pulse rate is?\"       60's   7. OTHER SYMPTOMS: \"Have you been sick recently?\" \"Have you had a recent injury?\"      Much darker than usual and more loose than normal, very tired    8. PREGNANCY: \"Is there any chance you are pregnant?\" \"When was your last menstrual period?\"      No    Protocols used: Blood Pressure - Low-A-OH    "

## 2025-05-05 NOTE — TELEPHONE ENCOUNTER
Patient states that he is concerned about his blood pressure and dark stool. Wondering if he should have testing done.     Chely Hanley on 5/5/2025 at 1:49 PM

## 2025-05-20 ENCOUNTER — TELEPHONE (OUTPATIENT)
Dept: INTERNAL MEDICINE | Facility: OTHER | Age: 75
End: 2025-05-20
Payer: COMMERCIAL

## 2025-05-20 DIAGNOSIS — I47.10 PAROXYSMAL SUPRAVENTRICULAR TACHYCARDIA: ICD-10-CM

## 2025-05-20 DIAGNOSIS — I50.22 CHRONIC SYSTOLIC HEART FAILURE (H): ICD-10-CM

## 2025-05-20 DIAGNOSIS — I25.2 HISTORY OF ST ELEVATION MYOCARDIAL INFARCTION (STEMI): ICD-10-CM

## 2025-05-20 DIAGNOSIS — I50.20 HEART FAILURE WITH REDUCED EJECTION FRACTION (H): Primary | ICD-10-CM

## 2025-05-20 DIAGNOSIS — I25.9 ISCHEMIC HEART DISEASE, CHRONIC: ICD-10-CM

## 2025-05-20 NOTE — TELEPHONE ENCOUNTER
Orders teed up for referral to Cardiology. Please sign if appropriate.  Verena Garcia LPN on 5/20/2025 at 2:34 PM  EXT. [phone]

## 2025-05-20 NOTE — TELEPHONE ENCOUNTER
Patient stated he needs to establish cardiology here at [facility]. He would like a referral put in to see cardiology , he stated at this  time of the year he would normally be having a echo, EKG and a consult done as he is due for his 1 year follow up , but the cardiologist he used to see at a different provider has moved or gone away now .       Megan Madison on 5/20/2025 at 2:24 PM

## 2025-05-27 DIAGNOSIS — N13.8 BPH WITH OBSTRUCTION/LOWER URINARY TRACT SYMPTOMS: Primary | ICD-10-CM

## 2025-05-27 DIAGNOSIS — R31.21 ASYMPTOMATIC MICROSCOPIC HEMATURIA: ICD-10-CM

## 2025-05-27 DIAGNOSIS — N40.1 BPH WITH OBSTRUCTION/LOWER URINARY TRACT SYMPTOMS: Primary | ICD-10-CM

## 2025-05-27 DIAGNOSIS — N39.41 URGE INCONTINENCE OF URINE: ICD-10-CM

## 2025-06-05 ENCOUNTER — RESULTS FOLLOW-UP (OUTPATIENT)
Dept: UROLOGY | Facility: OTHER | Age: 75
End: 2025-06-05

## 2025-06-05 ENCOUNTER — LAB (OUTPATIENT)
Dept: LAB | Facility: OTHER | Age: 75
End: 2025-06-05
Attending: UROLOGY
Payer: MEDICARE

## 2025-06-05 ENCOUNTER — OFFICE VISIT (OUTPATIENT)
Dept: UROLOGY | Facility: OTHER | Age: 75
End: 2025-06-05
Attending: UROLOGY
Payer: MEDICARE

## 2025-06-05 VITALS — TEMPERATURE: 97.3 F | HEART RATE: 57 BPM | RESPIRATION RATE: 14 BRPM | OXYGEN SATURATION: 96 %

## 2025-06-05 DIAGNOSIS — N13.8 BPH WITH OBSTRUCTION/LOWER URINARY TRACT SYMPTOMS: ICD-10-CM

## 2025-06-05 DIAGNOSIS — N39.41 URGE INCONTINENCE OF URINE: ICD-10-CM

## 2025-06-05 DIAGNOSIS — R31.21 ASYMPTOMATIC MICROSCOPIC HEMATURIA: ICD-10-CM

## 2025-06-05 DIAGNOSIS — N40.1 BPH WITH OBSTRUCTION/LOWER URINARY TRACT SYMPTOMS: ICD-10-CM

## 2025-06-05 DIAGNOSIS — N40.1 BPH WITH OBSTRUCTION/LOWER URINARY TRACT SYMPTOMS: Primary | ICD-10-CM

## 2025-06-05 DIAGNOSIS — N13.8 BPH WITH OBSTRUCTION/LOWER URINARY TRACT SYMPTOMS: Primary | ICD-10-CM

## 2025-06-05 LAB
ALBUMIN UR-MCNC: NEGATIVE MG/DL
APPEARANCE UR: CLEAR
BILIRUB UR QL STRIP: NEGATIVE
COLOR UR AUTO: ABNORMAL
GLUCOSE UR STRIP-MCNC: >1000 MG/DL
HGB UR QL STRIP: NEGATIVE
KETONES UR STRIP-MCNC: NEGATIVE MG/DL
LEUKOCYTE ESTERASE UR QL STRIP: NEGATIVE
NITRATE UR QL: NEGATIVE
PH UR STRIP: 5 [PH] (ref 5–9)
SP GR UR STRIP: 1.02 (ref 1–1.03)
UROBILINOGEN UR STRIP-MCNC: NORMAL MG/DL

## 2025-06-05 PROCEDURE — 81003 URINALYSIS AUTO W/O SCOPE: CPT | Mod: ZL

## 2025-06-05 PROCEDURE — 250N000013 HC RX MED GY IP 250 OP 250 PS 637: Performed by: UROLOGY

## 2025-06-05 PROCEDURE — 250N000009 HC RX 250: Performed by: UROLOGY

## 2025-06-05 PROCEDURE — 52000 CYSTOURETHROSCOPY: CPT | Performed by: UROLOGY

## 2025-06-05 RX ORDER — LIDOCAINE HYDROCHLORIDE 20 MG/ML
JELLY TOPICAL ONCE
Status: COMPLETED | OUTPATIENT
Start: 2025-06-05 | End: 2025-06-05

## 2025-06-05 RX ADMIN — LIDOCAINE HYDROCHLORIDE: 20 JELLY TOPICAL at 09:58

## 2025-06-05 RX ADMIN — CEPHALEXIN 250 MG: 250 CAPSULE ORAL at 10:30

## 2025-06-05 NOTE — NURSING NOTE
"Chief Complaint   Patient presents with    Procedure     Cystoscopy- hematuria       Initial Pulse 57   Temp 97.3  F (36.3  C)   Resp 14   SpO2 96%  Estimated body mass index is 33.77 kg/m  as calculated from the following:    Height as of 4/16/25: 1.765 m (5' 9.5\").    Weight as of 4/16/25: 105.2 kg (232 lb).  Medication Reconciliation: complete    Patient positioned in supine position, perineum area prepped with chlorhexidene Gluconate, betadine swabs, patient draped per sterile technique. Per verbal order read back by Jesus Bunch MD, Urojet 10mL 2% lidocaine jelly to be instilled into urethra.      Universal Protocol    A. Pre-procedure verification complete Yes  1-relevant information / documentation available, reviewed and properly matched to the patient; 2-consent accurate and complete, 3-equipment and supplies available    B. Site marking complete N/A  Site marked if not in continuous attendance with patient    C. TIME OUT completed Yes  Time Out was conducted just prior to starting procedure to verify the eight required elements: 1-patient identity, 2-consent accurate and complete, 3-position, 4-correct side/site marked (if applicable), 5-procedure, 6-relevant images / results properly labeled and displayed (if applicable), 7-antibiotics / irrigation fluids (if applicable), 8-safety precautions.    After procedure perineum area rinsed. Discharge instructions reviewed with patient. Patient verbalized understanding of discharge instructions and discharged ambulatory.  Marichuy Garcia RN..................6/5/2025  10:27 AM    "

## 2025-07-23 ENCOUNTER — TELEPHONE (OUTPATIENT)
Dept: INTERNAL MEDICINE | Facility: OTHER | Age: 75
End: 2025-07-23
Payer: COMMERCIAL

## 2025-07-23 NOTE — TELEPHONE ENCOUNTER
I submitted PA request for Jardiance 10MG tablets. This is the message I received: Message from [employer]: Drug is not covered by plan.  Marie Lawton on 7/23/2025 at 9:34 AM

## 2025-07-30 DIAGNOSIS — J41.0 SIMPLE CHRONIC BRONCHITIS (H): ICD-10-CM

## 2025-08-05 RX ORDER — ALBUTEROL SULFATE 90 UG/1
1-2 INHALANT RESPIRATORY (INHALATION) EVERY 4 HOURS PRN
Qty: 18 G | Refills: 3 | Status: SHIPPED | OUTPATIENT
Start: 2025-08-05